# Patient Record
Sex: FEMALE | Race: WHITE | Employment: OTHER | URBAN - METROPOLITAN AREA
[De-identification: names, ages, dates, MRNs, and addresses within clinical notes are randomized per-mention and may not be internally consistent; named-entity substitution may affect disease eponyms.]

---

## 2017-01-17 ENCOUNTER — HOSPITAL ENCOUNTER (OUTPATIENT)
Dept: MRI IMAGING | Age: 67
Discharge: HOME | End: 2017-01-17
Attending: ORTHOPAEDIC SURGERY
Payer: MEDICARE

## 2017-01-17 DIAGNOSIS — M17.12 PRIMARY LOCALIZED OSTEOARTHROSIS OF LOWER LEG, LEFT: ICD-10-CM

## 2017-01-17 PROCEDURE — 73721 MRI JNT OF LWR EXTRE W/O DYE: CPT

## 2017-01-23 ENCOUNTER — OFFICE VISIT (OUTPATIENT)
Dept: FAMILY MEDICINE CLINIC | Age: 67
End: 2017-01-23

## 2017-01-23 ENCOUNTER — HOSPITAL ENCOUNTER (OUTPATIENT)
Dept: LAB | Age: 67
Discharge: HOME | End: 2017-01-23
Payer: MEDICARE

## 2017-01-23 VITALS
TEMPERATURE: 98 F | DIASTOLIC BLOOD PRESSURE: 78 MMHG | HEIGHT: 66 IN | SYSTOLIC BLOOD PRESSURE: 138 MMHG | RESPIRATION RATE: 18 BRPM | WEIGHT: 225 LBS | HEART RATE: 60 BPM | OXYGEN SATURATION: 98 % | BODY MASS INDEX: 36.16 KG/M2

## 2017-01-23 DIAGNOSIS — E11.9 DIABETES MELLITUS WITHOUT COMPLICATION (HCC): Primary | ICD-10-CM

## 2017-01-23 DIAGNOSIS — J30.1 SEASONAL ALLERGIC RHINITIS DUE TO POLLEN: ICD-10-CM

## 2017-01-23 DIAGNOSIS — Z23 ENCOUNTER FOR IMMUNIZATION: ICD-10-CM

## 2017-01-23 DIAGNOSIS — E78.00 PURE HYPERCHOLESTEROLEMIA: ICD-10-CM

## 2017-01-23 DIAGNOSIS — M25.562 LEFT ANTERIOR KNEE PAIN: ICD-10-CM

## 2017-01-23 DIAGNOSIS — K21.9 GASTROESOPHAGEAL REFLUX DISEASE WITHOUT ESOPHAGITIS: ICD-10-CM

## 2017-01-23 DIAGNOSIS — I10 ESSENTIAL HYPERTENSION: ICD-10-CM

## 2017-01-23 PROCEDURE — 82043 UR ALBUMIN QUANTITATIVE: CPT

## 2017-01-23 PROCEDURE — 80061 LIPID PANEL: CPT

## 2017-01-23 PROCEDURE — 83036 HEMOGLOBIN GLYCOSYLATED A1C: CPT

## 2017-01-23 PROCEDURE — 80053 COMPREHEN METABOLIC PANEL: CPT

## 2017-01-23 RX ORDER — FAMOTIDINE 20 MG/1
20 TABLET, FILM COATED ORAL DAILY
COMMUNITY
End: 2021-09-16 | Stop reason: CLARIF

## 2017-01-23 NOTE — MR AVS SNAPSHOT
Visit Information Date & Time Provider Department Dept. Phone Encounter #  
 1/23/2017 10:45 AM Prateek Crouch  [facility] [phone] [id number] Your Appointments 3/27/2017  2:30 PM  
COMPLETE PHYSICAL with Prateek Crouch MD  
[facility]) Appt Note: CPE   11/21/16  jdp  
 [address] [phone]  
[phone]  
  
   
 [address] [address] Upcoming Health Maintenance Date Due  
 FOOT EXAM Q1 9/23/1960 Pneumococcal 65+ Low/Medium Risk (2 of 2 - PPSV23) 10/21/2016 MEDICARE YEARLY EXAM 12/29/2016 HEMOGLOBIN A1C Q6M 1/15/2017 COLONOSCOPY 7/11/2017 EYE EXAM RETINAL OR DILATED Q1 6/13/2017 LIPID PANEL Q1 7/15/2017 MICROALBUMIN Q1 8/31/2017 GLAUCOMA SCREENING Q2Y 6/13/2018 BREAST CANCER SCRN MAMMOGRAM 11/7/2018 DTaP/Tdap/Td series (2 - Td) 10/25/2020 Allergies as of 1/23/2017  Review Complete On: 1/23/2017 By: Prateek Crouch MD  
  
 Severity Noted Reaction Type Reactions Doxycycline  05/30/2012   Systemic Rash  
 Sulfa (Sulfonamide Antibiotics)  08/09/2010    Not Reported This Time Tetracycline  08/09/2010    Not Reported This Time Current Immunizations  Never Reviewed Name Date Influenza High Dose Vaccine PF 10/3/2016, 10/16/2015 Pneumococcal Conjugate (PCV-13) 10/21/2015 Pneumococcal Polysaccharide (PPSV-23)  Incomplete TDAP Vaccine 10/25/2010 Zoster Vaccine, Live 1/23/2013 Not reviewed this visit You Were Diagnosed With   
  
 Codes Comments Diabetes mellitus without complication ([facility]    -  Primary ICD-10-CM: E11.9 ICD-9-CM: 250.00 Essential hypertension     ICD-10-CM: I10 
ICD-9-CM: 401.9 Pure hypercholesterolemia     ICD-10-CM: E78.00 ICD-9-CM: 272.0 Gastroesophageal reflux disease without esophagitis     ICD-10-CM: K21.9 ICD-9-CM: 530.81   
 Seasonal allergic rhinitis due to pollen     ICD-10-CM: J30.1 ICD-9-CM: 477.0 Encounter for immunization     ICD-10-CM: M25 ICD-9-CM: V03.89 Vitals BP Pulse Temp Resp Height(growth percentile) Weight(growth percentile) 138/78 (BP 1 Location: Right arm, BP Patient Position: Sitting) 60 98 °F (36.7 °C) (Oral) 18 5' 6\" (1.676 m) 225 lb (102.1 kg) SpO2 BMI OB Status Smoking Status 98% 36.32 kg/m2 Postmenopausal Never Smoker Vitals History BMI and BSA Data Body Mass Index Body Surface Area  
 36.32 kg/m 2 2.18 m 2 Preferred Pharmacy Pharmacy Name Phone 305 [facility], [address] [place] [address] [address] Your Updated Medication List  
  
   
This list is accurate as of: 1/23/17 12:28 PM.  Always use your most recent med list.  
  
  
  
  
 ALEVE 220 mg tablet Generic drug:  naproxen sodium Take 220-440 mg by mouth two (2) times daily (with meals). Prn  
  
 atenolol 50 mg tablet Commonly known as:  TENORMIN Take 1 tablet by mouth  daily  
  
 multivitamin tablet Commonly known as:  ONE A DAY Take 1 Tab by mouth daily. PEPCID 20 mg tablet Generic drug:  famotidine Take 20 mg by mouth two (2) times a day. pravastatin 20 mg tablet Commonly known as:  PRAVACHOL Take 1 tablet by mouth  nightly PROBIOTIC PO Take  by mouth daily. TYLENOL EXTRA STRENGTH 500 mg tablet Generic drug:  acetaminophen Take 2 Tabs by mouth every six (6) hours as needed for Pain. ZyrTEC 10 mg tablet Generic drug:  cetirizine Take 10 mg by mouth daily. We Performed the Following HEMOGLOBIN A1C WITH EAG [26266 CPT(R)] LIPID PANEL [21952 CPT(R)] METABOLIC PANEL, COMPREHENSIVE [36107 CPT(R)] MICROALBUMIN, UR, RAND W/ MICROALBUMIN/CREA RATIO [id number] CPT(R)] PNEUMOCOCCAL POLYSACCHARIDE VACCINE, 23-VALENT, ADULT OR IMMUNOSUPPRESSED PT DOSE, [16533 CPT(R)] NM COLLECTION VENOUS BLOOD,VENIPUNCTURE [id number] CPT(R)] NM HANDLG&/OR CONVEY OF SPEC FOR TR OFFICE TO LAB [02430 CPT(R)] Patient Instructions Preventing Falls: Care Instructions Your Care Instructions Getting around your home safely can be a challenge if you have injuries or health problems that make it easy for you to fall. Loose rugs and furniture in walkways are among the dangers for many older people who have problems walking or who have poor eyesight. People who have conditions such as arthritis, osteoporosis, or dementia also have to be careful not to fall. You can make your home safer with a few simple measures. Follow-up care is a key part of your treatment and safety. Be sure to make and go to all appointments, and call your doctor if you are having problems. It's also a good idea to know your test results and keep a list of the medicines you take. How can you care for yourself at home? Taking care of yourself · You may get dizzy if you do not drink enough water. To prevent dehydration, drink plenty of fluids, enough so that your urine is light yellow or clear like water. Choose water and other caffeine-free clear liquids. If you have kidney, heart, or liver disease and have to limit fluids, talk with your doctor before you increase the amount of fluids you drink. · Exercise regularly to improve your strength, muscle tone, and balance. Walk if you can. Swimming may be a good choice if you cannot walk easily. · Have your vision and hearing checked each year or any time you notice a change. If you have trouble seeing and hearing, you might not be able to avoid objects and could lose your balance. · Know the side effects of the medicines you take. Ask your doctor or pharmacist whether the medicines you take can affect your balance. Sleeping pills or sedatives can affect your balance. · Limit the amount of alcohol you drink. Alcohol can impair your balance and other senses. · Ask your doctor whether calluses or corns on your feet need to be removed. If you wear loose-fitting shoes because of calluses or corns, you can lose your balance and fall. · Talk to your doctor if you have numbness in your feet. Preventing falls at home · Remove raised doorway thresholds, throw rugs, and clutter. Repair loose carpet or raised areas in the floor. · Move furniture and electrical cords to keep them out of walking paths. · Use nonskid floor wax, and wipe up spills right away, especially on ceramic tile floors. · If you use a walker or cane, put rubber tips on it. If you use crutches, clean the bottoms of them regularly with an abrasive pad, such as steel wool. · Keep your house well lit, especially [place], and outside walkways. Use night-lights in areas such as hallways and bathrooms. Add extra light switches or use remote switches (such as switches that go on or off when you clap your hands) to make it easier to turn lights on if you have to get up during the night. · Install sturdy handrails on stairways. · Move items in your cabinets so that the things you use a lot are on the lower shelves (about waist level). · Keep a cordless phone and a flashlight with new batteries by your bed. If possible, put a phone in each of the main rooms of your house, or carry a cell phone in case you fall and cannot reach a phone. Or, you can wear a device around your neck or wrist. You push a button that sends a signal for help. · Wear low-heeled shoes that fit well and give your feet good support. Use footwear with nonskid soles. Check the heels and soles of your shoes for wear. Repair or replace worn heels or soles. · Do not wear socks without shoes on wood floors. · Walk on the grass when the sidewalks are slippery. If you live in an area that gets snow and ice in the winter, sprinkle salt on slippery steps and sidewalks. Preventing falls in the bath · Install grab bars and nonskid mats inside and outside your shower or tub and near the toilet and sinks. · Use shower chairs and bath benches. · Use a hand-held shower head that will allow you to sit while showering. · Get into a tub or shower by putting the weaker leg in first. Get out of a tub or shower with your strong side first. 
· Repair loose toilet seats and consider installing a raised toilet seat to make getting on and off the toilet easier. · Keep your bathroom door unlocked while you are in the shower. Where can you learn more? Go to http://yonatan-bianca.info/. Enter [phone] in the search box to learn more about \"Preventing Falls: Care Instructions. \" Current as of: August 4, 2016 Content Version: 11.1 © 6089-0084 [employer]. Care instructions adapted under license by [employer] (which disclaims liability or warranty for this information). If you have questions about a medical condition or this instruction, always ask your healthcare professional. [place] [address] any warranty or liability for your use of this information. Introducing [facility] HEALTH [facility]! Dear Nohemi Mcfarlane: Thank you for requesting a ibeatyou account. Our records indicate that you already have an active ibeatyou account. You can access your account anytime at [email or web address]. [email or web address] Did you know that you can access your hospital and ER discharge instructions at any time in ibeatyou? You can also review all of your test results from your hospital stay or ER visit. Additional Information If you have questions, please visit the Frequently Asked Questions section of the ibeatyou website at [email or web address] [email or web address]/. Remember, ibeatyou is NOT to be used for urgent needs. For medical emergencies, dial 911. Now available from your iPhone and Android! Please provide this summary of care documentation to your next provider. Your primary care clinician is listed as [place] [address], [facility]/s . If you have any questions after today's visit, please call [phone].

## 2017-01-23 NOTE — PROGRESS NOTES
HISTORY OF PRESENT ILLNESS  HPI  Adrien Epps is a 78 y.o. Female with a history of GERD, HTN, hypercholesterolemia, and DM-II who presents to the office today for a fasting follow up. Pt reports weight gain due to stress from her 's recent cardiac troubles and a torn left knee meniscus in December. She will be having surgery by Dr. Odin Weston in February to repair this. Her last steroid was a Medrol Dosepak 2 weeks ago. Past Medical History   Diagnosis Date    AR (allergic rhinitis)     Calculus of kidney     Dyspareunia     GERD (gastroesophageal reflux disease) 11/30/2010    HBP (high blood pressure) 8/9/2010    History of motion sickness 3/29/2014    Incomplete rectal prolapse- asymptomatic-2015- found on GYN exam 8/6/2015    Obesity (BMI 30-39.9) 6/30/2013    Situational stress-elderly Mom in [facility] 3/29/2014     Past Surgical History   Procedure Laterality Date    Hx cholecystectomy  1987    Hx colonoscopy  2007     repeat in 2017    Endoscopy, colon, diagnostic  2007     repeat in 2017     Current Outpatient Prescriptions on File Prior to Visit   Medication Sig Dispense Refill    pravastatin (PRAVACHOL) 20 mg tablet Take 1 tablet by mouth  nightly 90 Tab 1    atenolol (TENORMIN) 50 mg tablet Take 1 tablet by mouth  daily 90 Tab 1    multivitamin (ONE A DAY) tablet Take 1 Tab by mouth daily.  LACTOBACILLUS ACIDOPHILUS (PROBIOTIC PO) Take  by mouth daily.  cetirizine (ZYRTEC) 10 mg tablet Take 10 mg by mouth daily.  naproxen sodium (ALEVE) 220 mg tablet Take 220-440 mg by mouth two (2) times daily (with meals). Prn      acetaminophen (TYLENOL EXTRA STRENGTH) 500 mg tablet Take 2 Tabs by mouth every six (6) hours as needed for Pain. No current facility-administered medications on file prior to visit.       Allergies   Allergen Reactions    Doxycycline Rash    Sulfa (Sulfonamide Antibiotics) Not Reported This Time    Tetracycline Not Reported This Time Family History   Problem Relation Age of Onset    Hypertension Mother     Elevated Lipids Mother     Cancer Other      melanoma in daughter     Social History     Social History    Marital status:      Spouse name: N/A    Number of children: N/A    Years of education: N/A     Social History Main Topics    Smoking status: Never Smoker    Smokeless tobacco: Never Used    Alcohol use Yes      Comment: occ    Drug use: No    Sexual activity: Yes     Other Topics Concern     Service No    Blood Transfusions No    Caffeine Concern No    Occupational Exposure No    Hobby Hazards No    Sleep Concern No    Stress Concern No    Weight Concern Yes    Special Diet No    Back Care No    Exercise Yes     walking    Bike Helmet No     does not ride   [place] Yes    Self-Exams Yes     Social History Narrative           Review of Systems   Constitutional: Negative for chills, diaphoresis, fever, malaise/fatigue and weight loss. Eyes: Negative for blurred vision, double vision, pain and redness. Respiratory: Negative for cough, shortness of breath and wheezing. Cardiovascular: Negative for chest pain, palpitations, orthopnea, claudication, leg swelling and PND. Genitourinary: Negative for frequency. Skin: Negative for itching and rash. Neurological: Negative for dizziness, tingling, tremors, sensory change, speech change, focal weakness, seizures, loss of consciousness, weakness and headaches. Endo/Heme/Allergies: Negative for environmental allergies and polydipsia. Does not bruise/bleed easily.      Results for orders placed or performed in visit on 01/23/17   LIPID PANEL   Result Value Ref Range    Cholesterol, total 194 100 - 199 mg/dL    Triglyceride 114 0 - 149 mg/dL    HDL Cholesterol 90 >39 mg/dL    VLDL, calculated 23 5 - 40 mg/dL    LDL, calculated 81 0 - 99 mg/dL   METABOLIC PANEL, COMPREHENSIVE   Result Value Ref Range    Glucose 107 (H) 65 - 99 mg/dL    BUN 11 8 - 27 mg/dL    Creatinine 0.79 0.57 - 1.00 mg/dL    GFR est non-AA 78 >59 mL/min/1.73    GFR est AA 90 >59 mL/min/1.73    BUN/Creatinine ratio 14 11 - 26    Sodium 142 134 - 144 mmol/L    Potassium 4.7 3.5 - 5.2 mmol/L    Chloride 101 96 - 106 mmol/L    CO2 26 18 - 29 mmol/L    Calcium 9.2 8.7 - 10.3 mg/dL    Protein, total 6.0 6.0 - 8.5 g/dL    Albumin 4.0 3.6 - 4.8 g/dL    GLOBULIN, TOTAL 2.0 1.5 - 4.5 g/dL    A-G Ratio 2.0 1.1 - 2.5    Bilirubin, total 0.7 0.0 - 1.2 mg/dL    Alk. phosphatase 109 39 - 117 IU/L    AST (SGOT) 29 0 - 40 IU/L    ALT (SGPT) 36 (H) 0 - 32 IU/L   HEMOGLOBIN A1C WITH EAG   Result Value Ref Range    Hemoglobin A1c 6.4 (H) 4.8 - 5.6 %    Estimated average glucose 137 mg/dL   MICROALBUMIN, UR, RAND W/ MICROALBUMIN/CREA RATIO   Result Value Ref Range    Creatinine, urine 41.7 Not Estab. mg/dL    Microalbumin, urine <3.0 Not Estab. ug/mL    Microalb/Creat ratio (ug/mg creat.) <7.2 0.0 - 30.0 mg/g creat   CVD REPORT   Result Value Ref Range    INTERPRETATION Note            Physical Exam  Visit Vitals    /78 (BP 1 Location: Right arm, BP Patient Position: Sitting)    Pulse 60    Temp 98 °F (36.7 °C) (Oral)    Resp 18    Ht 5' 6\" (1.676 m)    Wt 225 lb (102.1 kg)    SpO2 98%    BMI 36.32 kg/m2     Head: Normocephalic, without obvious abnormality, atraumatic  Eyes: conjunctivae/corneas clear. PERRL, EOM's intact.    Neck: supple, symmetrical, trachea midline, no adenopathy, thyroid: not enlarged, symmetric, no tenderness/mass/nodules, no carotid bruit and no JVD  Lungs: clear to auscultation bilaterally  Heart: regular rate and rhythm, S1, S2 normal, no murmur, click, rub or gallop  Extremities: extremities normal, atraumatic, no cyanosis or edema  Pulses: 2+ and symmetric  Lymph nodes: Cervical, supraclavicular, and axillary nodes normal.  Neurologic: Grossly normal  Diabetic foot exam:     Left: Reflexes 2+     Proprioception normal    Filament test normal sensation with micro filament   Pulse DP: 2+ (normal)   Pulse PT: 2+ (normal)   Deformities: None  Right: Reflexes 2+              Proprioception normal   Filament test normal sensation with micro filament   Pulse DP: 2+ (normal)   Pulse PT: 2+ (normal)   Deformities: None           ASSESSMENT and PLAN    ICD-10-CM ICD-9-CM    1. Diabetes mellitus without complication ([facility].)- 1479 E11.9 250.00 HEMOGLOBIN A1C WITH EAG      MICROALBUMIN, UR, RAND W/ MICROALBUMIN/CREA RATIO      NJ HANDLG&/OR CONVEY OF SPEC FOR TR OFFICE TO LAB      NJ COLLECTION VENOUS BLOOD,VENIPUNCTURE       DIABETES FOOT EXAM   2. Essential hypertension B47 240.4 METABOLIC PANEL, COMPREHENSIVE      NJ HANDLG&/OR CONVEY OF SPEC FOR TR OFFICE TO LAB      NJ COLLECTION VENOUS BLOOD,VENIPUNCTURE   3. Pure hypercholesterolemia E78.00 272.0 LIPID PANEL      NJ HANDLG&/OR CONVEY OF SPEC FOR TR OFFICE TO LAB      NJ COLLECTION VENOUS BLOOD,VENIPUNCTURE   4. Gastroesophageal reflux disease without esophagitis K21.9 530.81 famotidine (PEPCID) 20 mg tablet   5. Seasonal allergic rhinitis due to pollen J30.1 477.0    6. Encounter for immunization Z23 V03.89 PNEUMOCOCCAL POLYSACCHARIDE VACCINE, 23-VALENT, ADULT OR IMMUNOSUPPRESSED PT DOSE,   7. Left anterior knee pain M25.562 719.46      Nohemi Mcfarlane was seen today for hypertension, cholesterol problem and diabetes.     Diagnoses and all orders for this visit:    Diabetes mellitus without complication ([facility] 75.)- 6001  -     HEMOGLOBIN A1C WITH EAG  -     MICROALBUMIN, UR, RAND W/ MICROALBUMIN/CREA RATIO  -     NJ HANDLG&/OR CONVEY OF SPEC FOR TR OFFICE TO LAB  -     NJ COLLECTION VENOUS BLOOD,VENIPUNCTURE  -      DIABETES FOOT EXAM    Essential hypertension  -     METABOLIC PANEL, COMPREHENSIVE  -     NJ HANDLG&/OR CONVEY OF SPEC FOR TR OFFICE TO LAB  -     NJ COLLECTION VENOUS BLOOD,VENIPUNCTURE    Pure hypercholesterolemia  -     LIPID PANEL  -     NJ HANDLG&/OR CONVEY OF SPEC FOR TR OFFICE TO LAB  -     NJ COLLECTION VENOUS BLOOD,VENIPUNCTURE    Gastroesophageal reflux disease without esophagitis    Seasonal allergic rhinitis due to pollen    Encounter for immunization  -     Pneumococcal polysaccharide vaccine, 23-valent, adult or immunosuppressed pt dose    Left anterior knee pain    Other orders  -     CVD REPORT      Follow-up Disposition:  Return in about 6 months (around 7/23/2017), or bp or glucose OOC, for F/U HTN,CHOL,DM. Discussed specific diabetic recommendations: low cholesterol diet, weight control and daily exercise discussed, home glucose monitoring emphasized, all medications, side effects and compliance discussed carefully, foot care discussed and Podiatry visits discussed, annual eye examinations at Ophthalmology discussed, glycohemoglobin and other lab monitoring discussed and long term diabetic complications discussed. lab results and schedule of future lab studies reviewed with patient  reviewed diet, exercise and weight control  cardiovascular risk and specific lipid/LDL goals reviewed  reviewed medications and side effects in detail  Please call my office if there are any questions- [phone]. I will arrange for follow up after the lab tests done from today return    Call for refills on medications as needed. Discussed expected course/resolution/complications of diagnosis in detail with patient. Medication risks/benefits/costs/interactions/alternatives discussed with patient. Pt was given an after visit summary which includes diagnoses, current medications & vitals. Pt expressed understanding with the diagnosis and plan. Total 25 minutes,60 % counseling re:   I encouraged patient to lose weight. If she is unable to do that, we will need to start medication. Because she was able to lose some weight last year at first, we will give that 1 more try before we decide to add medicines.  Unfortunately the Prednisone she has been getting for her left knee has likely aggravated her glucose quite a bit as well.   Recommended a weekly \"heart check. \" I went into detail how to do this. Reviewed BP, cholesterol and diabetic goals. LDL goal<100,HDL goal>45, Triglyceride goal<150, A1C< 7.0, BP<140/90. Reviewed in detail the proper technique of checking the blood pressure- check it on an average day only, not on a stressful day, sitting, no exercise for at least 1 hour and not experiencing any new pain( chronic pain is OK). Patient encouraged to check BP sitting and standing at least once a month and to report these readings to me if > 140/ 90 on average , or if the standing BP is >  15 points lower than the sitting. Reviewed symptoms, or lack thereof, of hypertension and elevated cholesterol. Also, discussed symptoms of concern that were noted today in the note above, treatment options( including doing nothing), when to follow up before recommended time frame. Also, answered all questions. This document was written by Tanner Martin, as dictated by Doyle Goff MD.   I have reviewed and agree with the above note and have made corrections where appropriate Jagdepe Johnston M.D.

## 2017-01-23 NOTE — PROGRESS NOTES
Chief Complaint   Patient presents with    Hypertension     follow up    Cholesterol Problem     fasting today    Diabetes     follow up       Reviewed Record in preparation for visit and have obtained necessary documentation. Identified pt with two pt identifiers (Name @ )    Health Maintenance Due   Topic    FOOT EXAM Q1     Pneumococcal 65+ Low/Medium Risk (2 of 2 - PPSV23)    MEDICARE YEARLY EXAM     HEMOGLOBIN A1C Q6M     COLONOSCOPY          1. Have you been to the ER, urgent care clinic since your last visit? Hospitalized since your last visit? No    2. Have you seen or consulted any other health care providers outside of the [address] [place] [address] since your last visit? Include any pap smears or colon screening.  No

## 2017-01-23 NOTE — PATIENT INSTRUCTIONS

## 2017-01-24 LAB
ALBUMIN SERPL-MCNC: 4 G/DL (ref 3.6–4.8)
ALBUMIN/CREAT UR: <7.2 MG/G CREAT (ref 0–30)
ALBUMIN/GLOB SERPL: 2 {RATIO} (ref 1.1–2.5)
ALP SERPL-CCNC: 109 IU/L (ref 39–117)
ALT SERPL-CCNC: 36 IU/L (ref 0–32)
AST SERPL-CCNC: 29 IU/L (ref 0–40)
BILIRUB SERPL-MCNC: 0.7 MG/DL (ref 0–1.2)
BUN SERPL-MCNC: 11 MG/DL (ref 8–27)
BUN/CREAT SERPL: 14 (ref 11–26)
CALCIUM SERPL-MCNC: 9.2 MG/DL (ref 8.7–10.3)
CHLORIDE SERPL-SCNC: 101 MMOL/L (ref 96–106)
CHOLEST SERPL-MCNC: 194 MG/DL (ref 100–199)
CO2 SERPL-SCNC: 26 MMOL/L (ref 18–29)
CREAT SERPL-MCNC: 0.79 MG/DL (ref 0.57–1)
CREAT UR-MCNC: 41.7 MG/DL
EST. AVERAGE GLUCOSE BLD GHB EST-MCNC: 137 MG/DL
GLOBULIN SER CALC-MCNC: 2 G/DL (ref 1.5–4.5)
GLUCOSE SERPL-MCNC: 107 MG/DL (ref 65–99)
HBA1C MFR BLD: 6.4 % (ref 4.8–5.6)
HDLC SERPL-MCNC: 90 MG/DL
INTERPRETATION, 910389: NORMAL
LDLC SERPL CALC-MCNC: 81 MG/DL (ref 0–99)
MICROALBUMIN UR-MCNC: <3 UG/ML
POTASSIUM SERPL-SCNC: 4.7 MMOL/L (ref 3.5–5.2)
PROT SERPL-MCNC: 6 G/DL (ref 6–8.5)
SODIUM SERPL-SCNC: 142 MMOL/L (ref 134–144)
TRIGL SERPL-MCNC: 114 MG/DL (ref 0–149)
VLDLC SERPL CALC-MCNC: 23 MG/DL (ref 5–40)

## 2017-02-27 DIAGNOSIS — I10 ESSENTIAL HYPERTENSION: ICD-10-CM

## 2017-02-27 RX ORDER — ATENOLOL 50 MG/1
TABLET ORAL
Qty: 90 TAB | Refills: 1 | Status: SHIPPED | OUTPATIENT
Start: 2017-02-27 | End: 2017-06-07 | Stop reason: SDUPTHER

## 2017-02-27 RX ORDER — PRAVASTATIN SODIUM 20 MG/1
TABLET ORAL
Qty: 90 TAB | Refills: 1 | Status: SHIPPED | OUTPATIENT
Start: 2017-02-27 | End: 2017-06-07 | Stop reason: SDUPTHER

## 2017-05-01 ENCOUNTER — OFFICE VISIT (OUTPATIENT)
Dept: FAMILY MEDICINE CLINIC | Age: 67
End: 2017-05-01

## 2017-05-01 ENCOUNTER — HOSPITAL ENCOUNTER (OUTPATIENT)
Dept: LAB | Age: 67
Discharge: HOME | End: 2017-05-01
Payer: MEDICARE

## 2017-05-01 VITALS
OXYGEN SATURATION: 98 % | HEIGHT: 66 IN | RESPIRATION RATE: 18 BRPM | HEART RATE: 51 BPM | DIASTOLIC BLOOD PRESSURE: 84 MMHG | WEIGHT: 222.4 LBS | SYSTOLIC BLOOD PRESSURE: 118 MMHG | BODY MASS INDEX: 35.74 KG/M2 | TEMPERATURE: 97.8 F

## 2017-05-01 DIAGNOSIS — I10 ESSENTIAL HYPERTENSION: Primary | ICD-10-CM

## 2017-05-01 DIAGNOSIS — E11.9 DIABETES MELLITUS WITHOUT COMPLICATION (HCC): ICD-10-CM

## 2017-05-01 DIAGNOSIS — Z71.89 ACP (ADVANCE CARE PLANNING): ICD-10-CM

## 2017-05-01 DIAGNOSIS — Z00.00 MEDICARE ANNUAL WELLNESS VISIT, SUBSEQUENT: ICD-10-CM

## 2017-05-01 DIAGNOSIS — E78.00 PURE HYPERCHOLESTEROLEMIA: ICD-10-CM

## 2017-05-01 PROCEDURE — 80053 COMPREHEN METABOLIC PANEL: CPT

## 2017-05-01 PROCEDURE — 80061 LIPID PANEL: CPT

## 2017-05-01 PROCEDURE — 83036 HEMOGLOBIN GLYCOSYLATED A1C: CPT

## 2017-05-01 PROCEDURE — 36415 COLL VENOUS BLD VENIPUNCTURE: CPT

## 2017-05-01 RX ORDER — LEVOCETIRIZINE DIHYDROCHLORIDE 5 MG/1
TABLET, FILM COATED ORAL
COMMUNITY
End: 2017-10-02

## 2017-05-01 NOTE — PROGRESS NOTES
HISTORY OF PRESENT ILLNESS  HPI  Radha Krishnamurthy is a 78 y.o. Female with history of HTN, DM-II, and hyperlipidemia who presents to office today for follow-up. Pt denies SOB and chest pain. Pt does not check her glucose at home. Pt complains of right lower back pain radiating down her leg since 4/29/17 that is aggravated by standing. She has been taking Aleve. Pt notes increased stress due to her 's heart surgery about a month ago. Past Medical History:   Diagnosis Date    AR (allergic rhinitis)     Calculus of kidney     Dyspareunia     GERD (gastroesophageal reflux disease) 11/30/2010    HBP (high blood pressure) 8/9/2010    History of motion sickness 3/29/2014    Incomplete rectal prolapse- asymptomatic-2015- found on GYN exam 8/6/2015    Obesity (BMI 30-39.9) 6/30/2013    Situational stress-elderly Mom in [facility] 3/29/2014     Past Surgical History:   Procedure Laterality Date    ENDOSCOPY, COLON, DIAGNOSTIC  2007    repeat in 2017   [address] HX COLONOSCOPY  2007    repeat in 2017    HX KNEE ARTHROSCOPY Left 02/15/2017    meniscus     Current Outpatient Prescriptions on File Prior to Visit   Medication Sig Dispense Refill    atenolol (TENORMIN) 50 mg tablet Take 1 tablet by mouth  daily 90 Tab 1    pravastatin (PRAVACHOL) 20 mg tablet Take 1 tablet by mouth  nightly 90 Tab 1    famotidine (PEPCID) 20 mg tablet Take 20 mg by mouth two (2) times a day.  multivitamin (ONE A DAY) tablet Take 1 Tab by mouth daily.  LACTOBACILLUS ACIDOPHILUS (PROBIOTIC PO) Take  by mouth daily.  naproxen sodium (ALEVE) 220 mg tablet Take 220-440 mg by mouth two (2) times daily (with meals). Prn      acetaminophen (TYLENOL EXTRA STRENGTH) 500 mg tablet Take 2 Tabs by mouth every six (6) hours as needed for Pain. No current facility-administered medications on file prior to visit.       Allergies   Allergen Reactions    Doxycycline Rash    Sulfa (Sulfonamide Antibiotics) Not Reported This Time    Tetracycline Not Reported This Time     Family History   Problem Relation Age of Onset    Hypertension Mother     Elevated Lipids Mother     Cancer Other      melanoma in daughter     Social History     Social History    Marital status:      Spouse name: N/A    Number of children: N/A    Years of education: N/A     Social History Main Topics    Smoking status: Never Smoker    Smokeless tobacco: Never Used    Alcohol use Yes      Comment: occ    Drug use: No    Sexual activity: Yes     Other Topics Concern     Service No    Blood Transfusions No    Caffeine Concern No    Occupational Exposure No    Hobby Hazards No    Sleep Concern No    Stress Concern No    Weight Concern Yes    Special Diet No    Back Care No    Exercise Yes     walking    Bike Helmet No     does not ride   [place] Yes    Self-Exams Yes     Social History Narrative               Review of Systems   Constitutional: Negative for chills, diaphoresis, fever, malaise/fatigue and weight loss. Eyes: Negative for blurred vision, double vision and pain. Respiratory: Negative for cough, shortness of breath and wheezing. Cardiovascular: Negative for chest pain, palpitations, orthopnea, claudication, leg swelling and PND. Genitourinary: Negative for frequency. Musculoskeletal: Positive for back pain (right lower). Skin: Negative for itching and rash. Neurological: Negative for dizziness, tingling, tremors, sensory change, speech change, focal weakness, seizures, loss of consciousness, weakness and headaches. Endo/Heme/Allergies: Negative for environmental allergies and polydipsia. Does not bruise/bleed easily.      Results for orders placed or performed in visit on 01/23/17   LIPID PANEL   Result Value Ref Range    Cholesterol, total 194 100 - 199 mg/dL    Triglyceride 114 0 - 149 mg/dL    HDL Cholesterol 90 >39 mg/dL    VLDL, calculated 23 5 - 40 mg/dL    LDL, calculated 81 0 - 99 mg/dL   METABOLIC PANEL, COMPREHENSIVE   Result Value Ref Range    Glucose 107 (H) 65 - 99 mg/dL    BUN 11 8 - 27 mg/dL    Creatinine 0.79 0.57 - 1.00 mg/dL    GFR est non-AA 78 >59 mL/min/1.73    GFR est AA 90 >59 mL/min/1.73    BUN/Creatinine ratio 14 11 - 26    Sodium 142 134 - 144 mmol/L    Potassium 4.7 3.5 - 5.2 mmol/L    Chloride 101 96 - 106 mmol/L    CO2 26 18 - 29 mmol/L    Calcium 9.2 8.7 - 10.3 mg/dL    Protein, total 6.0 6.0 - 8.5 g/dL    Albumin 4.0 3.6 - 4.8 g/dL    GLOBULIN, TOTAL 2.0 1.5 - 4.5 g/dL    A-G Ratio 2.0 1.1 - 2.5    Bilirubin, total 0.7 0.0 - 1.2 mg/dL    Alk. phosphatase 109 39 - 117 IU/L    AST (SGOT) 29 0 - 40 IU/L    ALT (SGPT) 36 (H) 0 - 32 IU/L   HEMOGLOBIN A1C WITH EAG   Result Value Ref Range    Hemoglobin A1c 6.4 (H) 4.8 - 5.6 %    Estimated average glucose 137 mg/dL   MICROALBUMIN, UR, RAND W/ MICROALBUMIN/CREA RATIO   Result Value Ref Range    Creatinine, urine 41.7 Not Estab. mg/dL    Microalbumin, urine <3.0 Not Estab. ug/mL    Microalb/Creat ratio (ug/mg creat.) <7.2 0.0 - 30.0 mg/g creat   CVD REPORT   Result Value Ref Range    INTERPRETATION Note              Physical Exam  Visit Vitals    /84 (BP 1 Location: Right arm, BP Patient Position: Sitting)    Pulse (!) 51    Temp 97.8 °F (36.6 °C) (Oral)    Resp 18    Ht 5' 6\" (1.676 m)    Wt 222 lb 6.4 oz (100.9 kg)    SpO2 98%    BMI 35.9 kg/m2     Head: Normocephalic, without obvious abnormality, atraumatic  Eyes: conjunctivae/corneas clear. PERRL, EOM's intact.    Neck: supple, symmetrical, trachea midline, no adenopathy, thyroid: not enlarged, symmetric, no tenderness/mass/nodules, no carotid bruit and no JVD  Lungs: clear to auscultation bilaterally  Heart: regular rate and rhythm, S1, S2 normal, no murmur, click, rub or gallop  Extremities: extremities normal, atraumatic, no cyanosis or edema  Back: discomfort in the right lower back area aggravated by use of the muscles in that area, negative SLR bilaterally, discomfort in the right side with lifting her left leg more than lifting her right leg  Pulses: 2+ and symmetric  Lymph nodes: Cervical, supraclavicular, and axillary nodes normal.  Neurologic: Grossly normal            ASSESSMENT and PLAN    ICD-10-CM ICD-9-CM    1. Essential hypertension I10 401.9 LIPID PANEL      METABOLIC PANEL, COMPREHENSIVE   2. Pure hypercholesterolemia E78.00 272.0 LIPID PANEL      METABOLIC PANEL, COMPREHENSIVE   3. Diabetes mellitus without complication ([address]- 5388 E11.9 250.00 HEMOGLOBIN A1C WITH EAG   4. Medicare annual wellness visit, subsequent Z00.00 V70.0      Marie Samuels was seen today for diabetes, back pain and cholesterol problem. Diagnoses and all orders for this visit:    Essential hypertension  -     LIPID PANEL  -     METABOLIC PANEL, COMPREHENSIVE    Pure hypercholesterolemia  -     LIPID PANEL  -     METABOLIC PANEL, COMPREHENSIVE    Diabetes mellitus without complication ([facility]- 9008  -     HEMOGLOBIN A1C WITH EAG    Medicare annual wellness visit, subsequent      Follow-up Disposition:  Return in about 6 months (around 11/1/2017), or if right LBP symptoms worsen or fail to improve, for F/U DM, F/U cholesterol, F/U hypertension. lab results and schedule of future lab studies reviewed with patient  reviewed diet, exercise and weight control  cardiovascular risk and specific lipid/LDL goals reviewed  reviewed medications and side effects in detail  Please call my office if there are any questions- [phone]. I will arrange for follow up after the lab tests done from today return  Recommended a weekly \"heart check. \" I went into detail how to do this. Call for refills on medications as needed. Discussed expected course/resolution/complications of diagnosis in detail with patient. Medication risks/benefits/costs/interactions/alternatives discussed with patient.    Pt was given an after visit summary which includes diagnoses, current medications & vitals. Pt expressed understanding with the diagnosis and plan. Total 25 minutes,60 % counseling re: I reviewed her most recent A1C with her. Her weight went up a significant amount two visits ago and is now on its way back down. She's aware that she needs to keep the weight going in the right direction to help her diabetes, and we'll continue to work on that. Reviewed symptoms, or lack thereof, of hypertension and elevated cholesterol. Reviewed BP, cholesterol and diabetic goals. LDL goal<100,HDL goal>45, Triglyceride goal<150, A1C< 7.0, BP<140/90. Reviewed in detail the proper technique of checking the blood pressure- check it on an average day only, not on a stressful day, sitting, no exercise for at least 1 hour and not experiencing any new pain( chronic pain is OK). Patient encouraged to check BP sitting and standing at least once a month and to report these readings to me if > 140/ 90 on average , or if the standing BP is >  15 points lower than the sitting. For her back, I gave her lower back exercises and a sheet was given. She should do ten of each BID and may use Aleve 1-2 BID. I did encourage her to do a \"heart check\" on a weekly basis. She did have left knee arthroscopy back in February and had mainly a meniscus repair. She was told that she had a significant amount of arthritis in the same area, and I encouraged her to do cycling, working her way up to 30 minutes minimum three times a week. She should continue this lifelong to help control any arthritis. I discussed advance directives; apparently she did this with a  and will get her and her 's copy for the chart. Also, discussed symptoms of concern that were noted today in the note above, treatment options( including doing nothing), when to follow up before recommended time frame. Also, answered all questions.     This document was written by Adam Coon, as dictated by Karen Magallon MD.  I have reviewed and agree with the above note and have made corrections where appropriate Jagdeep Liu M.D.

## 2017-05-01 NOTE — MR AVS SNAPSHOT
Visit Information Date & Time Provider Department Dept. Phone Encounter #  
 5/1/2017 10:45 AM Ivett Georges MD [address] [place] [address] [address] [phone] [id number] Your Appointments 8/11/2017  9:00 AM  
COMPLETE PHYSICAL with Ivett Georges MD  
[facility]) Appt Note: COMPLETE PHYSICAL -  [facility]  3-  
 [address] [address]  
[phone]  
  
   
 [address] [address] Upcoming Health Maintenance Date Due  
 MEDICARE YEARLY EXAM 12/29/2016 COLONOSCOPY 7/11/2017 EYE EXAM RETINAL OR DILATED Q1 6/13/2017 HEMOGLOBIN A1C Q6M 7/23/2017 INFLUENZA AGE 9 TO ADULT 8/1/2017 MICROALBUMIN Q1 1/23/2018 LIPID PANEL Q1 1/23/2018 FOOT EXAM Q1 2/12/2018 GLAUCOMA SCREENING Q2Y 6/13/2018 BREAST CANCER SCRN MAMMOGRAM 11/7/2018 DTaP/Tdap/Td series (2 - Td) 10/25/2020 Allergies as of 5/1/2017  Review Complete On: 5/1/2017 By: Collier Schirmer, LPN Severity Noted Reaction Type Reactions Doxycycline  05/30/2012   Systemic Rash  
 Sulfa (Sulfonamide Antibiotics)  08/09/2010    Not Reported This Time Tetracycline  08/09/2010    Not Reported This Time Current Immunizations  Never Reviewed Name Date Influenza High Dose Vaccine PF 10/3/2016, 10/16/2015 Pneumococcal Conjugate (PCV-13) 10/21/2015 Pneumococcal Polysaccharide (PPSV-23) 1/23/2017 TDAP Vaccine 10/25/2010 Zoster Vaccine, Live 1/23/2013 Not reviewed this visit You Were Diagnosed With   
  
 Codes Comments Essential hypertension    -  Primary ICD-10-CM: I10 
ICD-9-CM: 401.9 Pure hypercholesterolemia     ICD-10-CM: E78.00 ICD-9-CM: 272.0 Diabetes mellitus without complication ([facility]     WER-11-OY: E11.9 ICD-9-CM: 250.00 Vitals BP Pulse Temp Resp Height(growth percentile) Weight(growth percentile) 118/84 (BP 1 Location: Right arm, BP Patient Position: Sitting) (!) 51 97.8 °F (36.6 °C) (Oral) 18 5' 6\" (1.676 m) 222 lb 6.4 oz (100.9 kg) SpO2 BMI OB Status Smoking Status 98% 35.9 kg/m2 Postmenopausal Never Smoker Vitals History BMI and BSA Data Body Mass Index Body Surface Area 35.9 kg/m 2 2.17 m 2 Preferred Pharmacy Pharmacy Name Phone 305 [facility], [address] [place] [address] [address] Your Updated Medication List  
  
   
This list is accurate as of: 5/1/17 11:37 AM.  Always use your most recent med list.  
  
  
  
  
 ALEVE 220 mg tablet Generic drug:  naproxen sodium Take 220-440 mg by mouth two (2) times daily (with meals). Prn  
  
 atenolol 50 mg tablet Commonly known as:  TENORMIN Take 1 tablet by mouth  daily  
  
 multivitamin tablet Commonly known as:  ONE A DAY Take 1 Tab by mouth daily. PEPCID 20 mg tablet Generic drug:  famotidine Take 20 mg by mouth two (2) times a day. pravastatin 20 mg tablet Commonly known as:  PRAVACHOL Take 1 tablet by mouth  nightly PROBIOTIC PO Take  by mouth daily. TYLENOL EXTRA STRENGTH 500 mg tablet Generic drug:  acetaminophen Take 2 Tabs by mouth every six (6) hours as needed for Pain. XYZAL 5 mg tablet Generic drug:  levocetirizine Take  by mouth. ZyrTEC 10 mg tablet Generic drug:  cetirizine Take 10 mg by mouth daily. We Performed the Following HEMOGLOBIN A1C WITH EAG [73356 CPT(R)] LIPID PANEL [61224 CPT(R)] METABOLIC PANEL, COMPREHENSIVE [69932 CPT(R)] Patient Instructions Noninsulin Medicines for Type 2 Diabetes: Care Instructions Your Care Instructions There are different types of noninsulin medicines for diabetes. Each works in a different way to help you control your blood sugar. Some types help your body make insulin to lower your blood sugar.  Others lower how much insulin your body needs. Some can slow how quickly your body digests sugars. And some can remove extra glucose through your urine. Some of these medicines are: · Alpha-glucosidase inhibitors. These keep starches from breaking down. This means that they lower the amount of glucose absorbed when you eat. They do not help your body make more insulin. So they will not cause low blood sugar unless they are used with other medicines for diabetes. They include acarbose and miglitol. · DPP-4 inhibitors. These help your body increase the level of insulin after eating. They also help your body make less of a hormone that raises blood sugar. They include linagliptin, saxagliptin, and sitagliptin. · Incretin hormones (GLP-1 receptor agonists). Your body makes a protein that can raise your insulin level, lower your blood sugar, and make you less hungry. You can inject hormone medicines that work the same way as this protein. They include exenatide and liraglutide. · Meglitinides. These help your body release insulin. They also help slow how your body digests sugars. In this way, they prevent your blood sugar from rising too fast after you eat. They include nateglinide and repaglinide. · Metformin. This lowers how much glucose your liver makes. And it helps you respond better to insulin. It also lowers the amount of stored sugar that your liver releases when you are not eating. · Sodium glucose co-transporter 2 inhibitors (SGLT2 inhibitors). These help to remove extra glucose through your urine. They may also help some people lose weight. They include canagliflozin, dapagliflozin, and empagliflozin. · Sulfonylureas. These help your body release more insulin. Some work for many hours and can cause low blood sugar if you don't eat as you expected. They include glipizide and glyburide. · Thiazolidinediones. These reduce the amount of blood glucose. They also help you respond better to insulin.  They include pioglitazone and rosiglitazone. You may need to take more than one medicine for diabetes. Two or more medicines may work better to lower your blood sugar level than just one medicine. Follow-up care is a key part of your treatment and safety. Be sure to make and go to all appointments, and call your doctor if you are having problems. It's also a good idea to know your test results and keep a list of the medicines you take. How can you care for yourself at home? · Eat a healthy diet. Get some exercise each day. This may help you to reduce how much medicine you need. · Do not take other prescription or over-the-counter medicines, vitamins, herbal products, or supplements without talking to your doctor first. Some medicines for type 2 diabetes can cause problems with other medicines or supplements. · Tell your doctor if you plan to get pregnant. Some of these drugs are not safe for pregnant women. · Be safe with medicines. Take your medicines exactly as prescribed. Meglitinides or sulfonylureas can cause your blood sugar to drop very low. Call your doctor if you think you are having a problem with your medicine. · Check your blood sugar levels often. You can use a glucose monitor. Keeping track can help you know how certain foods, activities, and medicines affect your blood sugar. And it can help you keep your blood sugar from getting so low that it's not safe. When should you call for help? Call 911 anytime you think you may need emergency care. For example, call if: 
· You passed out (lost consciousness), or you suddenly become very sleepy or confused. (You may have very low blood sugar.) · You have symptoms of high blood sugar, such as: ¨ Blurred vision. ¨ Trouble staying awake or being woken up. ¨ Fast, deep breathing. ¨ Breath that smells fruity. ¨ Belly pain, not feeling hungry, and vomiting. ¨ Feeling confused. Call your doctor now or seek immediate medical care if: · You are sick and cannot control your blood sugar. · You have been vomiting or have had diarrhea for more than 6 hours. · Your blood sugar stays higher than the level your doctor has set for you. · You have symptoms of low blood sugar, such as: ¨ Sweating. ¨ Feeling nervous, shaky, and weak. ¨ Extreme hunger and slight nausea. ¨ Dizziness and headache. ¨ Blurred vision. ¨ Confusion. Watch closely for changes in your health, and be sure to contact your doctor if: 
· You have a hard time knowing when your blood sugar is low. · You have trouble keeping your blood sugar in the target range. · You often have problems controlling your blood sugar. · You have symptoms of long-term diabetes problems, such as: ¨ New vision changes. ¨ New pain, numbness, or tingling in your hands or feet. ¨ Skin problems. Where can you learn more? Go to http://yonatan-bianca.info/. Enter H153 in the search box to learn more about \"Noninsulin Medicines for Type 2 Diabetes: Care Instructions. \" Current as of: August 3, 2016 Content Version: 11.2 © 0330-1768 [employer]. Care instructions adapted under license by [employer] (which disclaims liability or warranty for this information). If you have questions about a medical condition or this instruction, always ask your healthcare professional. [address] any warranty or liability for your use of this information. Introducing [facility]! Dear Flomot Custard: Thank you for requesting a Aligo account. Our records indicate that you already have an active Aligo account. You can access your account anytime at [email or web address] Did you know that you can access your hospital and ER discharge instructions at any time in Aligo? You can also review all of your test results from your hospital stay or ER visit. Additional Information If you have questions, please visit the Frequently Asked Questions section of the [employer] website at [email or web address] Remember, Surveying [employer] Mapping (SAM) is NOT to be used for urgent needs. For medical emergencies, dial 911. Now available from your iPhone and Android! Please provide this summary of care documentation to your next provider. Your primary care clinician is listed as [address] [address], [facility]/s . If you have any questions after today's visit, please call [phone].

## 2017-05-01 NOTE — PROGRESS NOTES
Chief Complaint   Patient presents with    Diabetes     follow up    Back Pain     1. Have you been to the ER, urgent care clinic since your last visit? Hospitalized since your last visit? Yes When: April 7, 2016 in [place], sinus infection    2. Have you seen or consulted any other health care providers outside of the [address] [place] [address] since your last visit? Include any pap smears or colon screening.  No

## 2017-05-01 NOTE — PATIENT INSTRUCTIONS
Noninsulin Medicines for Type 2 Diabetes: Care Instructions  Your Care Instructions  There are different types of noninsulin medicines for diabetes. Each works in a different way to help you control your blood sugar. Some types help your body make insulin to lower your blood sugar. Others lower how much insulin your body needs. Some can slow how quickly your body digests sugars. And some can remove extra glucose through your urine. Some of these medicines are:  · Alpha-glucosidase inhibitors. These keep starches from breaking down. This means that they lower the amount of glucose absorbed when you eat. They do not help your body make more insulin. So they will not cause low blood sugar unless they are used with other medicines for diabetes. They include acarbose and miglitol. · DPP-4 inhibitors. These help your body increase the level of insulin after eating. They also help your body make less of a hormone that raises blood sugar. They include linagliptin, saxagliptin, and sitagliptin. · Incretin hormones (GLP-1 receptor agonists). Your body makes a protein that can raise your insulin level, lower your blood sugar, and make you less hungry. You can inject hormone medicines that work the same way as this protein. They include exenatide and liraglutide. · Meglitinides. These help your body release insulin. They also help slow how your body digests sugars. In this way, they prevent your blood sugar from rising too fast after you eat. They include nateglinide and repaglinide. · Metformin. This lowers how much glucose your liver makes. And it helps you respond better to insulin. It also lowers the amount of stored sugar that your liver releases when you are not eating. · Sodium glucose co-transporter 2 inhibitors (SGLT2 inhibitors). These help to remove extra glucose through your urine. They may also help some people lose weight. They include canagliflozin, dapagliflozin, and empagliflozin. · Sulfonylureas. These help your body release more insulin. Some work for many hours and can cause low blood sugar if you don't eat as you expected. They include glipizide and glyburide. · Thiazolidinediones. These reduce the amount of blood glucose. They also help you respond better to insulin. They include pioglitazone and rosiglitazone. You may need to take more than one medicine for diabetes. Two or more medicines may work better to lower your blood sugar level than just one medicine. Follow-up care is a key part of your treatment and safety. Be sure to make and go to all appointments, and call your doctor if you are having problems. It's also a good idea to know your test results and keep a list of the medicines you take. How can you care for yourself at home? · Eat a healthy diet. Get some exercise each day. This may help you to reduce how much medicine you need. · Do not take other prescription or over-the-counter medicines, vitamins, herbal products, or supplements without talking to your doctor first. Some medicines for type 2 diabetes can cause problems with other medicines or supplements. · Tell your doctor if you plan to get pregnant. Some of these drugs are not safe for pregnant women. · Be safe with medicines. Take your medicines exactly as prescribed. Meglitinides or sulfonylureas can cause your blood sugar to drop very low. Call your doctor if you think you are having a problem with your medicine. · Check your blood sugar levels often. You can use a glucose monitor. Keeping track can help you know how certain foods, activities, and medicines affect your blood sugar. And it can help you keep your blood sugar from getting so low that it's not safe. When should you call for help? Call 911 anytime you think you may need emergency care. For example, call if:  · You passed out (lost consciousness), or you suddenly become very sleepy or confused.  (You may have very low blood sugar.)  · You have symptoms of high blood sugar, such as:  ¨ Blurred vision. ¨ Trouble staying awake or being woken up. ¨ Fast, deep breathing. ¨ Breath that smells fruity. ¨ Belly pain, not feeling hungry, and vomiting. ¨ Feeling confused. Call your doctor now or seek immediate medical care if:  · You are sick and cannot control your blood sugar. · You have been vomiting or have had diarrhea for more than 6 hours. · Your blood sugar stays higher than the level your doctor has set for you. · You have symptoms of low blood sugar, such as:  ¨ Sweating. ¨ Feeling nervous, shaky, and weak. ¨ Extreme hunger and slight nausea. ¨ Dizziness and headache. ¨ Blurred vision. ¨ Confusion. Watch closely for changes in your health, and be sure to contact your doctor if:  · You have a hard time knowing when your blood sugar is low. · You have trouble keeping your blood sugar in the target range. · You often have problems controlling your blood sugar. · You have symptoms of long-term diabetes problems, such as:  ¨ New vision changes. ¨ New pain, numbness, or tingling in your hands or feet. ¨ Skin problems. Where can you learn more? Go to http://yonatan-bianca.info/. Enter H153 in the search box to learn more about \"Noninsulin Medicines for Type 2 Diabetes: Care Instructions. \"  Current as of: August 3, 2016  Content Version: 11.2  © 5271-1602 [employer]. Care instructions adapted under license by [employer] (which disclaims liability or warranty for this information). If you have questions about a medical condition or this instruction, always ask your healthcare professional. [place] [address] any warranty or liability for your use of this information.

## 2017-05-02 PROBLEM — Z71.89 ACP (ADVANCE CARE PLANNING): Status: ACTIVE | Noted: 2017-05-02

## 2017-05-02 LAB
ALBUMIN SERPL-MCNC: 4.1 G/DL (ref 3.6–4.8)
ALBUMIN/GLOB SERPL: 1.8 {RATIO} (ref 1.2–2.2)
ALP SERPL-CCNC: 93 IU/L (ref 39–117)
ALT SERPL-CCNC: 17 IU/L (ref 0–32)
AST SERPL-CCNC: 21 IU/L (ref 0–40)
BILIRUB SERPL-MCNC: 0.6 MG/DL (ref 0–1.2)
BUN SERPL-MCNC: 13 MG/DL (ref 8–27)
BUN/CREAT SERPL: 15 (ref 12–28)
CALCIUM SERPL-MCNC: 9.2 MG/DL (ref 8.7–10.3)
CHLORIDE SERPL-SCNC: 100 MMOL/L (ref 96–106)
CHOLEST SERPL-MCNC: 178 MG/DL (ref 100–199)
CO2 SERPL-SCNC: 23 MMOL/L (ref 18–29)
CREAT SERPL-MCNC: 0.86 MG/DL (ref 0.57–1)
EST. AVERAGE GLUCOSE BLD GHB EST-MCNC: 114 MG/DL
GLOBULIN SER CALC-MCNC: 2.3 G/DL (ref 1.5–4.5)
GLUCOSE SERPL-MCNC: 93 MG/DL (ref 65–99)
HBA1C MFR BLD: 5.6 % (ref 4.8–5.6)
HDLC SERPL-MCNC: 70 MG/DL
INTERPRETATION, 910389: NORMAL
LDLC SERPL CALC-MCNC: 89 MG/DL (ref 0–99)
POTASSIUM SERPL-SCNC: 4.2 MMOL/L (ref 3.5–5.2)
PROT SERPL-MCNC: 6.4 G/DL (ref 6–8.5)
SODIUM SERPL-SCNC: 141 MMOL/L (ref 134–144)
TRIGL SERPL-MCNC: 95 MG/DL (ref 0–149)
VLDLC SERPL CALC-MCNC: 19 MG/DL (ref 5–40)

## 2017-05-02 NOTE — PROGRESS NOTES
Sampson Kelsey is a 78 y.o. female and presents for annual Medicare Wellness Visit. Problem List: Reviewed with patient and discussed risk factors. Patient Active Problem List   Diagnosis Code    Dyspareunia [id number]    Calculus of kidney N20.0    Obesity (BMI 30-39. 9) E66.9    Situational stress-elderly Mom in [place] F43.9    History of motion sickness Z87.898    Incomplete rectal prolapse- asymptomatic-2015- found on GYN exam K62.3    Diabetes mellitus without complication ([facility] [address]- 3091 E11.9    Essential hypertension I10    Allergic rhinitis due to pollen J30.1    Pure hypercholesterolemia E78.00    Gastroesophageal reflux disease without esophagitis K21.9    Benign papule- anterior to right pinna12/2015 R23.8       Current medical providers:  Patient Care Team:  Denisse Swanson MD as PCP - General    PSH: Reviewed with patient  Past Surgical History:   Procedure Laterality Date    ENDOSCOPY, COLON, DIAGNOSTIC  2007    repeat in 2017    HX [address] HX COLONOSCOPY  2007    repeat in 2017    HX KNEE ARTHROSCOPY Left 02/15/2017    meniscus        SH: Reviewed with patient  Social History   Substance Use Topics    Smoking status: Never Smoker    Smokeless tobacco: Never Used    Alcohol use Yes      Comment: occ       FH: Reviewed with patient  Family History   Problem Relation Age of Onset    Hypertension Mother     Elevated Lipids Mother     Cancer Other      melanoma in daughter       Medications/Allergies: Reviewed with patient  Current Outpatient Prescriptions on File Prior to Visit   Medication Sig Dispense Refill    atenolol (TENORMIN) 50 mg tablet Take 1 tablet by mouth  daily 90 Tab 1    pravastatin (PRAVACHOL) 20 mg tablet Take 1 tablet by mouth  nightly 90 Tab 1    famotidine (PEPCID) 20 mg tablet Take 20 mg by mouth two (2) times a day.  multivitamin (ONE A DAY) tablet Take 1 Tab by mouth daily.       LACTOBACILLUS ACIDOPHILUS (PROBIOTIC PO) Take  by mouth daily.      naproxen sodium (ALEVE) 220 mg tablet Take 220-440 mg by mouth two (2) times daily (with meals). Prn      acetaminophen (TYLENOL EXTRA STRENGTH) 500 mg tablet Take 2 Tabs by mouth every six (6) hours as needed for Pain. No current facility-administered medications on file prior to visit. Allergies   Allergen Reactions    Doxycycline Rash    Sulfa (Sulfonamide Antibiotics) Not Reported This Time    Tetracycline Not Reported This Time       Objective:  Visit Vitals    /84 (BP 1 Location: Right arm, BP Patient Position: Sitting)    Pulse (!) 51    Temp 97.8 °F (36.6 °C) (Oral)    Resp 18    Ht 5' 6\" (1.676 m)    Wt 222 lb 6.4 oz (100.9 kg)    SpO2 98%    BMI 35.9 kg/m2    Body mass index is 35.9 kg/(m^2). Assessment of cognitive impairment: Alert and oriented x 3    Depression Screen:   PHQ 2 / 9, over the last two weeks 1/23/2017   Little interest or pleasure in doing things Not at all   Feeling down, depressed or hopeless Not at all   Total Score PHQ 2 0       Fall Risk Assessment:    Fall Risk Assessment, last 12 mths 1/23/2017   Able to walk? Yes   Fall in past 12 months? Yes   Fall with injury? Yes   Number of falls in past 12 months 1   Fall Risk Score 2       Functional Ability:   Does the patient exhibit a steady gait? yes   How long did it take the patient to get up and walk from a sitting position? 34-5 seconds     Is the patient self reliant?  (ie can do own laundry, meals, household chores)  yes     Does the patient handle his/her own medications? yes     Does the patient handle his/her own money? yes     Is the patients home safe (ie good lighting, handrails on stairs and bath, etc.)? yes     Did you notice or did patient express any hearing difficulties? no     Did you notice or did patient express any vision difficulties?   no     Were distance and reading eye charts used? Yes.   Patient's  full eye exam by an ophthalmologist every 2 years is unremarkable: no glaucoma or macular degeneration. Advance Care Planning:   Patient was offered the opportunity to discuss advance care planning:  yes     Does patient have an Advance Directive:  Yes. I reviewed advanced directive information and written information given to patient; patient to make follow up appointment with a NN for any questions,to review choices made for health care, agent, and anatomical gifts that are on the advanced directive at home. If no, did you provide information on Caring Connections? yes       Plan:    Advance Care Planning (ACP) Provider Note - Comprehensive     Date of ACP Conversation: 05/02/17  Persons included in Conversation:  patient  Length of ACP Conversation in minutes:  <16 minutes (Non-Billable)    Authorized Decision Maker (if patient is incapable of making informed decisions):    This person is:  Healthcare Agent/Medical Power of  under Advance Directive          General ACP for ALL Patients with Decision Making Capacity:   Importance of advance care planning, including choosing a healthcare agent to communicate patient's healthcare decisions if patient lost the ability to make decisions, such as after a sudden illness or accident  Understanding of the healthcare agent role was assessed and information provided    Review of Existing Advance Directive:  will obtain copty of advanced directive has at home- wishes no changes    For Serious or Chronic Illness:  No known illness    Interventions Provided:  Recommended communicating the plan and making copies for the healthcare agent, personal physician, and others as appropriate (e.g., health system)  Recommended review of completed ACP document annually or upon change in health status    Orders Placed This Encounter    HEMOGLOBIN A1C WITH EAG    LIPID PANEL    METABOLIC PANEL, COMPREHENSIVE    levocetirizine (XYZAL) 5 mg tablet       Health Maintenance   Topic Date Due    MEDICARE YEARLY EXAM 12/29/2016    COLONOSCOPY  07/11/2017    EYE EXAM RETINAL OR DILATED Q1  06/13/2017    HEMOGLOBIN A1C Q6M  07/23/2017    INFLUENZA AGE 9 TO ADULT  08/01/2017    MICROALBUMIN Q1  01/23/2018    LIPID PANEL Q1  01/23/2018    FOOT EXAM Q1  02/12/2018    GLAUCOMA SCREENING Q2Y  06/13/2018    BREAST CANCER SCRN MAMMOGRAM  11/07/2018    DTaP/Tdap/Td series (2 - Td) 10/25/2020    Hepatitis C Screening  Addressed    OSTEOPOROSIS SCREENING (DEXA)  Completed    ZOSTER VACCINE AGE 60>  Completed    Pneumococcal 65+ Low/Medium Risk  Completed       *Patient verbalized understanding and agreement with the plan. A copy of the After Visit Summary with personalized health plan was given to the patient today.

## 2017-06-07 DIAGNOSIS — I10 ESSENTIAL HYPERTENSION: ICD-10-CM

## 2017-06-07 RX ORDER — PRAVASTATIN SODIUM 20 MG/1
TABLET ORAL
Qty: 90 TAB | Refills: 1 | Status: SHIPPED | OUTPATIENT
Start: 2017-06-07 | End: 2017-12-13 | Stop reason: SDUPTHER

## 2017-06-07 RX ORDER — ATENOLOL 50 MG/1
TABLET ORAL
Qty: 90 TAB | Refills: 1 | Status: SHIPPED | OUTPATIENT
Start: 2017-06-07 | End: 2017-10-05 | Stop reason: SDUPTHER

## 2017-08-08 RX ORDER — SCOPALAMINE 1 MG/3D
PATCH, EXTENDED RELEASE TRANSDERMAL
Qty: 28 PATCH | Refills: 0 | Status: SHIPPED | OUTPATIENT
Start: 2017-08-08 | End: 2018-08-28 | Stop reason: ALTCHOICE

## 2017-10-02 RX ORDER — SODIUM, POTASSIUM,MAG SULFATES 17.5-3.13G
177 SOLUTION, RECONSTITUTED, ORAL ORAL
COMMUNITY
End: 2017-12-18 | Stop reason: ALTCHOICE

## 2017-10-02 RX ORDER — CETIRIZINE HCL 10 MG
10 TABLET ORAL DAILY
COMMUNITY
End: 2022-06-10

## 2017-10-04 ENCOUNTER — ANESTHESIA EVENT (OUTPATIENT)
Dept: ENDOSCOPY | Age: 67
End: 2017-10-04
Payer: MEDICARE

## 2017-10-04 ENCOUNTER — HOSPITAL ENCOUNTER (OUTPATIENT)
Age: 67
Setting detail: OUTPATIENT SURGERY
Discharge: HOME | End: 2017-10-04
Attending: SPECIALIST | Admitting: SPECIALIST
Payer: MEDICARE

## 2017-10-04 ENCOUNTER — ANESTHESIA (OUTPATIENT)
Dept: ENDOSCOPY | Age: 67
End: 2017-10-04
Payer: MEDICARE

## 2017-10-04 VITALS
HEIGHT: 66 IN | BODY MASS INDEX: 35.23 KG/M2 | DIASTOLIC BLOOD PRESSURE: 78 MMHG | RESPIRATION RATE: 20 BRPM | WEIGHT: 219.19 LBS | TEMPERATURE: 98.2 F | SYSTOLIC BLOOD PRESSURE: 153 MMHG | HEART RATE: 55 BPM | OXYGEN SATURATION: 100 %

## 2017-10-04 PROCEDURE — 74011250636 HC RX REV CODE- 250/636: Performed by: SPECIALIST

## 2017-10-04 PROCEDURE — 74011000250 HC RX REV CODE- 250

## 2017-10-04 PROCEDURE — 76060000031 HC ANESTHESIA FIRST 0.5 HR: Performed by: SPECIALIST

## 2017-10-04 PROCEDURE — 74011250637 HC RX REV CODE- 250/637: Performed by: ANESTHESIOLOGY

## 2017-10-04 PROCEDURE — 74011250636 HC RX REV CODE- 250/636

## 2017-10-04 PROCEDURE — 76040000019: Performed by: SPECIALIST

## 2017-10-04 RX ORDER — SODIUM CHLORIDE 0.9 % (FLUSH) 0.9 %
5-10 SYRINGE (ML) INJECTION AS NEEDED
Status: DISCONTINUED | OUTPATIENT
Start: 2017-10-04 | End: 2017-10-04 | Stop reason: HOSPADM

## 2017-10-04 RX ORDER — DEXTROMETHORPHAN/PSEUDOEPHED 2.5-7.5/.8
1.2 DROPS ORAL
Status: DISCONTINUED | OUTPATIENT
Start: 2017-10-04 | End: 2017-10-04 | Stop reason: HOSPADM

## 2017-10-04 RX ORDER — NALOXONE HYDROCHLORIDE 0.4 MG/ML
0.4 INJECTION, SOLUTION INTRAMUSCULAR; INTRAVENOUS; SUBCUTANEOUS
Status: DISCONTINUED | OUTPATIENT
Start: 2017-10-04 | End: 2017-10-04 | Stop reason: HOSPADM

## 2017-10-04 RX ORDER — ATROPINE SULFATE 0.1 MG/ML
0.5 INJECTION INTRAVENOUS
Status: DISCONTINUED | OUTPATIENT
Start: 2017-10-04 | End: 2017-10-04 | Stop reason: HOSPADM

## 2017-10-04 RX ORDER — EPINEPHRINE 0.1 MG/ML
1 INJECTION INTRACARDIAC; INTRAVENOUS
Status: DISCONTINUED | OUTPATIENT
Start: 2017-10-04 | End: 2017-10-04 | Stop reason: HOSPADM

## 2017-10-04 RX ORDER — LIDOCAINE HYDROCHLORIDE 20 MG/ML
INJECTION, SOLUTION EPIDURAL; INFILTRATION; INTRACAUDAL; PERINEURAL AS NEEDED
Status: DISCONTINUED | OUTPATIENT
Start: 2017-10-04 | End: 2017-10-04 | Stop reason: HOSPADM

## 2017-10-04 RX ORDER — FLUMAZENIL 0.1 MG/ML
0.2 INJECTION INTRAVENOUS
Status: DISCONTINUED | OUTPATIENT
Start: 2017-10-04 | End: 2017-10-04 | Stop reason: HOSPADM

## 2017-10-04 RX ORDER — PROPOFOL 10 MG/ML
INJECTION, EMULSION INTRAVENOUS AS NEEDED
Status: DISCONTINUED | OUTPATIENT
Start: 2017-10-04 | End: 2017-10-04 | Stop reason: HOSPADM

## 2017-10-04 RX ORDER — SODIUM CHLORIDE 9 MG/ML
50 INJECTION, SOLUTION INTRAVENOUS CONTINUOUS
Status: DISCONTINUED | OUTPATIENT
Start: 2017-10-04 | End: 2017-10-04 | Stop reason: HOSPADM

## 2017-10-04 RX ORDER — SODIUM CHLORIDE 0.9 % (FLUSH) 0.9 %
5-10 SYRINGE (ML) INJECTION EVERY 8 HOURS
Status: DISCONTINUED | OUTPATIENT
Start: 2017-10-04 | End: 2017-10-04 | Stop reason: HOSPADM

## 2017-10-04 RX ORDER — ASPIRIN 81 MG/1
81 TABLET ORAL DAILY
COMMUNITY
End: 2020-01-20 | Stop reason: ALTCHOICE

## 2017-10-04 RX ADMIN — SIMETHICONE 80 MG: 20 SUSPENSION/ DROPS ORAL at 12:44

## 2017-10-04 RX ADMIN — PROPOFOL 150 MG: 10 INJECTION, EMULSION INTRAVENOUS at 12:43

## 2017-10-04 RX ADMIN — PROPOFOL 30 MG: 10 INJECTION, EMULSION INTRAVENOUS at 12:27

## 2017-10-04 RX ADMIN — SODIUM CHLORIDE 50 ML/HR: 900 INJECTION, SOLUTION INTRAVENOUS at 12:19

## 2017-10-04 RX ADMIN — PROPOFOL 70 MG: 10 INJECTION, EMULSION INTRAVENOUS at 12:23

## 2017-10-04 RX ADMIN — PROPOFOL 40 MG: 10 INJECTION, EMULSION INTRAVENOUS at 12:25

## 2017-10-04 RX ADMIN — LIDOCAINE HYDROCHLORIDE 40 MG: 20 INJECTION, SOLUTION EPIDURAL; INFILTRATION; INTRACAUDAL; PERINEURAL at 12:23

## 2017-10-04 NOTE — PERIOP NOTES
Anesthesia reports 290 mg Propofol, 40 mg Lidocaine and 350 mL NS given during procedure. Received report from anesthesia staff on vital signs and status of patient.

## 2017-10-04 NOTE — IP AVS SNAPSHOT
[address] [facility] 
[phone] Patient: Cooper Kaur MRN: [id number] CPY:[birth year] You are allergic to the following Allergen Reactions Doxycycline Rash  
    
 Sulfa (Sulfonamide Antibiotics) Rash Tetracycline Rash Recent Documentation Height Weight BMI OB Status Smoking Status 1.676 m 99.4 kg 35.38 kg/m2 Postmenopausal Never Smoker Emergency Contacts Name Discharge Info Relation Home Work Mobile Rafa Lopez DISCHARGE CAREGIVER [3] Spouse [3] [phone] About your hospitalization You were admitted on:  October 4, 2017 You last received care in the:  [facility] ENDOSCOPY You were discharged on:  October 4, 2017 Unit phone number:  [phone] Why you were hospitalized Your primary diagnosis was:  Not on File Providers Seen During Your Hospitalizations Provider Role Specialty Primary office phone Isreal Spurling, MD Attending Provider Gastroenterology [phone] Your Primary Care Physician (PCP) Primary Care Physician Office Phone Office Fax Richard Argenis [phone] Follow-up Information None Current Discharge Medication List  
  
CONTINUE these medications which have NOT CHANGED Dose & Instructions Dispensing Information Comments Morning Noon Evening Bedtime ALEVE 220 mg tablet Generic drug:  naproxen sodium Your last dose was: Your next dose is:    
   
   
 Dose:  220-440 mg Take 220-440 mg by mouth two (2) times daily (with meals). Prn Refills:  0  
     
   
   
   
  
 aspirin delayed-release 81 mg tablet Your last dose was: Your next dose is:    
   
   
 Dose:  81 mg Take 81 mg by mouth daily. Refills:  0  
     
   
   
   
  
 atenolol 50 mg tablet Commonly known as:  TENORMIN Your last dose was: Your next dose is: Take 1 tablet by mouth  daily Quantity:  90 Tab Refills:  1  
     
   
   
   
  
 multivitamin tablet Commonly known as:  ONE A DAY Your last dose was: Your next dose is:    
   
   
 Dose:  1 Tab Take 1 Tab by mouth daily. Refills:  0 PEPCID 20 mg tablet Generic drug:  famotidine Your last dose was: Your next dose is:    
   
   
 Dose:  20 mg Take 20 mg by mouth two (2) times a day. Refills:  0  
     
   
   
   
  
 pravastatin 20 mg tablet Commonly known as:  PRAVACHOL Your last dose was: Your next dose is: Take 1 tablet by mouth  nightly Quantity:  90 Tab Refills:  1 PROBIOTIC PO Your last dose was: Your next dose is: Take  by mouth daily. Refills:  0 SUPREP BOWEL PREP KIT 17.5-3.13-1.6 gram Solr oral solution Generic drug:  sodium-potassium-mag sulfate Your last dose was: Your next dose is:    
   
   
 Dose:  177 mL Take 177 mL by mouth. Refills:  0  
     
   
   
   
  
 TRANSDERM-SCOP 1.5 mg (1 mg over 3 days) Pt3d Generic drug:  scopolamine Your last dose was: Your next dose is:    
   
   
 Apply 1 patch transdermally every 72 hours as directed Quantity:  28 Patch Refills:  0  
     
   
   
   
  
 TYLENOL EXTRA STRENGTH 500 mg tablet Generic drug:  acetaminophen Your last dose was: Your next dose is:    
   
   
 Dose:  1000 mg Take 2 Tabs by mouth every six (6) hours as needed for Pain. Refills:  0 ZyrTEC 10 mg tablet Generic drug:  cetirizine Your last dose was: Your next dose is:    
   
   
 Dose:  10 mg Take 10 mg by mouth daily. Refills:  0 Discharge Instructions Diane Wilson [id number] 
1950 COLON DISCHARGE INSTRUCTIONS Discomfort: 
Redness at IV site- apply warm compress to area; if redness or soreness persist- contact your physician There may be a slight amount of blood passed from the rectum Gaseous discomfort- walking, belching will help relieve any discomfort You may not operate a vehicle for 12 hours You may not engage in an occupation involving machinery or appliances for rest of today You may not drink alcoholic beverages for at least 12 hours Avoid making any critical decisions for at least 24 hour DIET: 
 Regular diet.  however -  remember your colon is empty and a heavy meal will produce gas. Avoid these foods:  vegetables, fried / greasy foods, carbonated drinks for today. MEDICATIONS: 
  
 
 Regarding Aspirin or Nonsteroidal medications, please see below. ACTIVITY: 
You may resume your normal daily activities it is recommended that you spend the remainder of the day resting -  avoid any strenuous activity. CALL M.D. ANY SIGN OF: Increasing pain, nausea, vomiting Abdominal distension (swelling) New increased bleeding (oral or rectal) Fever (chills) Pain in chest area Bloody discharge from nose or mouth Shortness of breath Tylenol as needed for pain. Follow-up Instructions: 
 Call Dr. Blake Alvarez for questions about procedure at telephone #  [phone] Repeat Colonoscopy in 10 years. Discharge Orders None ACO Transitions of Care Introducing [employer] [address] Tonya Taran offers a voluntary care coordination program to provide high quality service and care to [facility] fee-for-service beneficiaries. Randy Espinal was designed to help you enhance your health and well-being through the following services: ? Transitions of Care  support for individuals who are transitioning from one care setting to another (example: Hospital to home). ?  Chronic and Complex Care Coordination  support for individuals and caregivers of those with serious or chronic illnesses or with more than one chronic (ongoing) condition and those who take a number of different medications. If you meet specific medical criteria, a [address] [place] [address] Rd may call you directly to coordinate your care with your primary care physician and your other care providers. For questions about the [facility] programs, please, contact your physicians office. For general questions or additional information about Accountable Care Organizations: 
Please visit www.medicare.gov/acos. html or call 1-800-MEDICARE (4-393.664.3860) TTY users should call 2-594.490.4582. Introducing \[facility] [place] [facility]\"" & HEALTH SERVICES! Dear Verdis Gottron: Thank you for requesting a [employer] account. Our records indicate that you already have an active [employer] account. You can access your account anytime at [email or web address] Did you know that you can access your hospital and ER discharge instructions at any time in [employer]? You can also review all of your test results from your hospital stay or ER visit. Additional Information If you have questions, please visit the Frequently Asked Questions section of the [employer] website at [email or web address] Remember, [employer] is NOT to be used for urgent needs. For medical emergencies, dial 911. Now available from your iPhone and Android! General Information Please provide this summary of care documentation to your next provider. Patient Signature:  ____________________________________________________________ Date:  ____________________________________________________________  
  
Jamshid Mackenzie Provider Signature:  ____________________________________________________________ Date:  ____________________________________________________________

## 2017-10-04 NOTE — PROCEDURES
Colonoscopy Procedure Note    Indications:   Screening colonoscopy    Referring Physician: Iilana Bowens MD  Anesthesia/Sedation: MAC anesthesia Propofol  Endoscopist:  Dr. Amelia Parekh    Procedure in Detail:  Informed consent was obtained for the procedure, including sedation. Risks of perforation, hemorrhage, adverse drug reaction, and aspiration were discussed. The patient was placed in the left lateral decubitus position. Based on the pre-procedure assessment, including review of the patient's medical history, medications, allergies, and review of systems, she had been deemed to be an appropriate candidate for moderate sedation; she was therefore sedated with the medications listed above. The patient was monitored continuously with ECG tracing, pulse oximetry, blood pressure monitoring, and direct observations. A rectal examination was performed. The [id number] was inserted into the rectum and advanced under direct vision to the cecum, which was identified by the ileocecal valve and appendiceal orifice. The quality of the colonic preparation was adequate. A careful inspection was made as the colonoscope was withdrawn, including a retroflexed view of the rectum; findings and interventions are described below. Appropriate photodocumentation was obtained. Findings:     1. Scope advanced to the cecum. 2.  Preparation was adequate. 3.  Normal mucosa throughout. 4.  No polyps seen. 5.  Diffuse diverticulosis of moderate severity in sigmoid colon. Therapies:  none    Specimen:  none     Complications: None were encountered during the procedure. EBL: none.     Recommendations:   -Repeat Colonoscopy in 10 years    Signed By: Gilmer Niño MD                        October 4, 2017

## 2017-10-04 NOTE — H&P
Gastroenterology Outpatient History and Physical    Patient: Kesha Kirk    Physician: Phillip Stafford MD    Vital Signs: There were no vitals taken for this visit. Allergies: Allergies   Allergen Reactions    Doxycycline Rash    Sulfa (Sulfonamide Antibiotics) Rash    Tetracycline Rash       Chief Complaint: Screening    History of Present Illness: 74 yo F for screening colonoscopy.     Justification for Procedure: above    History:  Past Medical History:   Diagnosis Date    AR (allergic rhinitis)     Calculus of kidney     Dyspareunia     GERD (gastroesophageal reflux disease) 11/30/2010    HBP (high blood pressure) 8/9/2010    History of motion sickness 3/29/2014    Incomplete rectal prolapse- asymptomatic-2015- found on GYN exam 8/6/2015    Obesity (BMI 30-39.9) 6/30/2013    Situational stress-elderly Mom in [facility] 3/29/2014      Past Surgical History:   Procedure Laterality Date    ENDOSCOPY, COLON, DIAGNOSTIC  2007    repeat in 2017    HX CHOLECYSTECTOMY  1987    HX COLONOSCOPY  2007    repeat in 2017    HX KNEE ARTHROSCOPY Left 02/15/2017    meniscus      Social History     Social History    Marital status:      Spouse name: N/A    Number of children: N/A    Years of education: N/A     Social History Main Topics    Smoking status: Never Smoker    Smokeless tobacco: Never Used    Alcohol use Yes      Comment: occ    Drug use: No    Sexual activity: Yes     Other Topics Concern     Service No    Blood Transfusions No    Caffeine Concern No    Occupational Exposure No    Hobby Hazards No    Sleep Concern No    Stress Concern No    Weight Concern Yes    Special Diet No    Back Care No    Exercise Yes     walking    Bike Helmet No     does not ride   [place] Yes    Self-Exams Yes     Social History Narrative      Family History   Problem Relation Age of Onset    Hypertension Mother     Elevated Lipids Mother     Cancer Other      melanoma in daughter       Medications:   Prior to Admission medications    Medication Sig Start Date End Date Taking? Authorizing Provider   aspirin delayed-release 81 mg tablet Take 81 mg by mouth daily. Yes Historical Provider   cetirizine (ZYRTEC) 10 mg tablet Take 10 mg by mouth daily. Yes Historical Provider   sodium-potassium-mag sulfate (SUPREP BOWEL PREP KIT) 17.5-3.13-1.6 gram solr oral solution Take 177 mL by mouth. Yes Historical Provider   atenolol (TENORMIN) 50 mg tablet Take 1 tablet by mouth  daily 6/7/17  Yes Kayy Post MD   pravastatin (PRAVACHOL) 20 mg tablet Take 1 tablet by mouth  nightly 6/7/17  Yes Kayy Post MD   famotidine (PEPCID) 20 mg tablet Take 20 mg by mouth two (2) times a day. Yes Historical Provider   multivitamin (ONE A DAY) tablet Take 1 Tab by mouth daily. Yes Historical Provider   LACTOBACILLUS ACIDOPHILUS (PROBIOTIC PO) Take  by mouth daily. Yes Historical Provider   naproxen sodium (ALEVE) 220 mg tablet Take 220-440 mg by mouth two (2) times daily (with meals). Prn   Yes Kayy Post MD   TRANSDERM-SCOP 1.5 mg (1 mg over 3 days) pt3d Apply 1 patch transdermally every 72 hours as directed 8/8/17   Kayy Post MD   acetaminophen (TYLENOL EXTRA STRENGTH) 500 mg tablet Take 2 Tabs by mouth every six (6) hours as needed for Pain. Kayy Post MD       Physical Exam:   General: alert, no distress   HEENT: Head: Normocephalic, no lesions, without obvious abnormality. Heart: regular rate and rhythm, S1, S2 normal, no murmur, click, rub or gallop   Lungs: chest clear, no wheezing, rales, normal symmetric air entry   Abdominal: soft, nontender, nondistended, + BS   Neurological: Grossly normal   Extremities: extremities normal, atraumatic, no cyanosis or edema     Findings/Diagnosis: Screening colonoscopy.     Plan of Care/Planned Procedure: Colonoscopy with MAC    Signed By: Daysi Ortiz MD     October 4, 2017

## 2017-10-04 NOTE — ANESTHESIA POSTPROCEDURE EVALUATION
Post-Anesthesia Evaluation and Assessment    Patient: Governor Santos MRN: [id number]  SSN: [id number]    YOB: 1950  Age: 75 y.o. Sex: female       Cardiovascular Function/Vital Signs  Visit Vitals    /66    Pulse (!) 56    Temp 36.8 °C (98.2 °F)    Resp 15    Ht 5' 6\" (1.676 m)    Wt 99.4 kg (219 lb 3 oz)    SpO2 100%    BMI 35.38 kg/m2       Patient is status post total IV anesthesia anesthesia for Procedure(s):  COLONOSCOPY. Nausea/Vomiting: None    Postoperative hydration reviewed and adequate. Pain:  Pain Scale 1: Numeric (0 - 10) (10/04/17 1209)  Pain Intensity 1: 0 (10/04/17 1209)   Managed    Neurological Status: At baseline    Mental Status and Level of Consciousness: Arousable    Pulmonary Status:   O2 Device: CO2 nasal cannula (10/04/17 1246)   Adequate oxygenation and airway patent    Complications related to anesthesia: None    Post-anesthesia assessment completed.  No concerns    Signed By: Noni Beckford MD     October 4, 2017

## 2017-10-04 NOTE — ROUTINE PROCESS
[facility]  1950  [id number]    Situation:  Verbal report received from: Estefania Sosa RN  Procedure: Procedure(s):  COLONOSCOPY    Background:    Preoperative diagnosis: SCREENING   Postoperative diagnosis:   diverticulosis,     :  Dr. Prasanth Roblero  Assistant(s): Endoscopy Technician-1: Saulo Duque  Endoscopy RN-1: Alex Silverman    Specimens: * No specimens in log *  H. Pylori  no    Assessment:  Intra-procedure medications     Anesthesia gave intra-procedure sedation and medications, see anesthesia flow sheet yes    Intravenous fluids: NS@ KVO     Vital signs stable       Abdominal assessment: round and soft       Recommendation:  Discharge patient per MD order  .     Family or Friend  Rafa  Permission to share finding with family or friend yes

## 2017-10-04 NOTE — ANESTHESIA PREPROCEDURE EVALUATION
Anesthetic History   No history of anesthetic complications            Review of Systems / Medical History  Patient summary reviewed, nursing notes reviewed and pertinent labs reviewed    Pulmonary  Within defined limits                 Neuro/Psych         Psychiatric history     Cardiovascular    Hypertension          Hyperlipidemia    Exercise tolerance: >4 METS  Comments:  Hx bradycardia     GI/Hepatic/Renal     GERD    Renal disease: stones       Endo/Other    Diabetes    Obesity     Other Findings   Comments: Dyspareunia   allergic rhinitis  Hx motion sickness  Incomplete rectal prolapse- asymptomatic         Physical Exam    Airway  Mallampati: II    Neck ROM: normal range of motion   Mouth opening: Normal     Cardiovascular  Regular rate and rhythm,  S1 and S2 normal,  no murmur, click, rub, or gallop             Dental  No notable dental hx       Pulmonary  Breath sounds clear to auscultation               Abdominal  GI exam deferred       Other Findings            Anesthetic Plan    ASA: 2  Anesthesia type: total IV anesthesia          Induction: Intravenous  Anesthetic plan and risks discussed with: Patient

## 2017-12-13 ENCOUNTER — OFFICE VISIT (OUTPATIENT)
Dept: FAMILY MEDICINE CLINIC | Age: 67
End: 2017-12-13

## 2017-12-13 ENCOUNTER — HOSPITAL ENCOUNTER (OUTPATIENT)
Dept: LAB | Age: 67
Discharge: HOME | End: 2017-12-13
Payer: MEDICARE

## 2017-12-13 VITALS
OXYGEN SATURATION: 98 % | TEMPERATURE: 97.9 F | BODY MASS INDEX: 36.45 KG/M2 | HEART RATE: 56 BPM | HEIGHT: 66 IN | SYSTOLIC BLOOD PRESSURE: 135 MMHG | DIASTOLIC BLOOD PRESSURE: 77 MMHG | WEIGHT: 226.8 LBS | RESPIRATION RATE: 18 BRPM

## 2017-12-13 DIAGNOSIS — E78.00 PURE HYPERCHOLESTEROLEMIA: ICD-10-CM

## 2017-12-13 DIAGNOSIS — I10 ESSENTIAL HYPERTENSION: ICD-10-CM

## 2017-12-13 DIAGNOSIS — K21.9 GASTROESOPHAGEAL REFLUX DISEASE WITHOUT ESOPHAGITIS: ICD-10-CM

## 2017-12-13 DIAGNOSIS — R68.89 COLD INTOLERANCE: ICD-10-CM

## 2017-12-13 DIAGNOSIS — F43.9 SITUATIONAL STRESS: Chronic | ICD-10-CM

## 2017-12-13 DIAGNOSIS — E11.9 DIABETES MELLITUS WITHOUT COMPLICATION (HCC): Primary | ICD-10-CM

## 2017-12-13 PROCEDURE — 82043 UR ALBUMIN QUANTITATIVE: CPT

## 2017-12-13 PROCEDURE — 80061 LIPID PANEL: CPT

## 2017-12-13 PROCEDURE — 80053 COMPREHEN METABOLIC PANEL: CPT

## 2017-12-13 PROCEDURE — 36415 COLL VENOUS BLD VENIPUNCTURE: CPT

## 2017-12-13 PROCEDURE — 83036 HEMOGLOBIN GLYCOSYLATED A1C: CPT

## 2017-12-13 PROCEDURE — 84439 ASSAY OF FREE THYROXINE: CPT

## 2017-12-13 PROCEDURE — 84443 ASSAY THYROID STIM HORMONE: CPT

## 2017-12-13 RX ORDER — PRAVASTATIN SODIUM 20 MG/1
TABLET ORAL
Qty: 90 TAB | Refills: 1 | Status: SHIPPED | OUTPATIENT
Start: 2017-12-13 | End: 2017-12-18 | Stop reason: ALTCHOICE

## 2017-12-13 RX ORDER — OFLOXACIN 3 MG/ML
SOLUTION/ DROPS OPHTHALMIC
COMMUNITY
Start: 2017-12-12 | End: 2017-12-18 | Stop reason: ALTCHOICE

## 2017-12-13 RX ORDER — PREDNISOLONE ACETATE 10 MG/ML
SUSPENSION/ DROPS OPHTHALMIC
COMMUNITY
Start: 2017-12-12 | End: 2017-12-18 | Stop reason: ALTCHOICE

## 2017-12-13 RX ORDER — PRAVASTATIN SODIUM 20 MG/1
TABLET ORAL
Qty: 90 TAB | Refills: 1 | Status: SHIPPED | OUTPATIENT
Start: 2017-12-13 | End: 2018-04-06 | Stop reason: SDUPTHER

## 2017-12-13 NOTE — PATIENT INSTRUCTIONS
Learning About Type 2 Diabetes  What is type 2 diabetes? Insulin is a hormone that helps your body use sugar from your food as energy. Type 2 diabetes happens when your body can't use insulin the right way. Over time, the pancreas can't make enough insulin. If you don't have enough insulin, too much sugar stays in your blood. If you are overweight, get little or no exercise, or have type 2 diabetes in your family, you are more likely to have problems with the way insulin works in your body.  Americans, Hispanics, Native Americans,  Americans, and Pacific Islanders have a higher risk for type 2 diabetes. Type 2 diabetes can be prevented or delayed with a healthy lifestyle, which includes staying at a healthy weight, making smart food choices, and getting regular exercise. What can you expect with type 2 diabetes? Johanna Figueroa keep hearing about how important it is to keep your blood sugar within a target range. That's because over time, high blood sugar can lead to serious problems. It can:  · Harm your eyes, nerves, and kidneys. · Damage your blood vessels, leading to heart disease and stroke. · Reduce blood flow and cause nerve damage to parts of your body, especially your feet. This can cause slow healing and pain when you walk. · Make your immune system weak and less able to fight infections. When people hear the word \"diabetes,\" they often think of problems like these. But daily care and treatment can help prevent or delay these problems. The goal is to keep your blood sugar in a target range. That's the best way to reduce your chance of having more problems from diabetes. What are the symptoms? Some people who have type 2 diabetes may not have any symptoms early on. Many people with the disease don't even know they have it at first. But with time, diabetes starts to cause symptoms. You experience most symptoms of type 2 diabetes when your blood sugar is either too high or too low.   The most common symptoms of high blood sugar include:  · Thirst.  · Frequent urination. · Weight loss. · Blurry vision. The symptoms of low blood sugar include:  · Sweating. · Shakiness. · Weakness. · Hunger. · Confusion. How can you prevent type 2 diabetes? The best way to prevent or delay type 2 diabetes is to adopt healthy habits, which include:  · Staying at a healthy weight. · Exercising regularly. · Eating healthy foods. How is type 2 diabetes treated? If you have type 2 diabetes, here are the most important things you can do. · Take your diabetes medicines. · Check your blood sugar as often as your doctor recommends. Also, get a hemoglobin A1c test at least every 6 months. · Try to eat a variety of foods and to spread carbohydrate throughout the day. Carbohydrate raises blood sugar higher and more quickly than any other nutrient does. Carbohydrate is found in sugar, breads and cereals, fruit, starchy vegetables such as potatoes and corn, and milk and yogurt. · Get at least 30 minutes of exercise on most days of the week. Walking is a good choice. You also may want to do other activities, such as running, swimming, cycling, or playing tennis or team sports. If your doctor says it's okay, do muscle-strengthening exercises at least 2 times a week. · See your doctor for checkups and tests on a regular schedule. · If you have high blood pressure or high cholesterol, take the medicines as prescribed by your doctor. · Do not smoke. Smoking can make health problems worse. This includes problems you might have with type 2 diabetes. If you need help quitting, talk to your doctor about stop-smoking programs and medicines. These can increase your chances of quitting for good. Follow-up care is a key part of your treatment and safety. Be sure to make and go to all appointments, and call your doctor if you are having problems.  It's also a good idea to know your test results and keep a list of the medicines you take. Where can you learn more? Go to http://yonatan-bianca.info/. Enter X340 in the search box to learn more about \"Learning About Type 2 Diabetes. \"  Current as of: March 13, 2017  Content Version: 11.4  © 3397-0371 [employer]. Care instructions adapted under license by [employer] (which disclaims liability or warranty for this information). If you have questions about a medical condition or this instruction, always ask your healthcare professional. [address] any warranty or liability for your use of this information.

## 2017-12-13 NOTE — PROGRESS NOTES
HISTORY OF PRESENT ILLNESS  HPI  Jeanne Goldman is a 75 y.o. female with history of HTN, GERD, hyperlipidemia, and DM-II who presents to office today for fasting follow-up. Pt has not been checking her BP out of office. She states she walks for exercise and goes to the gym around 3 times a week, often using an exercise bike. She denies lightheadedness. She denies unusual SOB and chest pain. Pt had an arthroscopic left meniscus repair in 2/2017. Before the surgery, she states she had severe left knee pain. She denies any other recent ER visits or hospitalizations. Pt notes some hoarseness. She takes Zyrtec year-round and notes a history of seasonal allergies. Past Medical History:   Diagnosis Date    AR (allergic rhinitis)     Calculus of kidney     Dyspareunia     GERD (gastroesophageal reflux disease) 11/30/2010    HBP (high blood pressure) 8/9/2010    History of motion sickness 3/29/2014    Incomplete rectal prolapse- asymptomatic-2015- found on GYN exam 8/6/2015    Obesity (BMI 30-39.9) 6/30/2013    Situational stress-elderly Mom in [place] 3/29/2014     Past Surgical History:   Procedure Laterality Date    COLONOSCOPY N/A 10/4/2017    COLONOSCOPY performed by Angus Tomlinson MD at [address] [place] [address], DIAGNOSTIC  2007    repeat in 2017   [address] HX COLONOSCOPY  2007    repeat in 2017    HX KNEE ARTHROSCOPY Left 02/15/2017    meniscus     Current Outpatient Prescriptions on File Prior to Visit   Medication Sig Dispense Refill    atenolol (TENORMIN) 50 mg tablet Take 1 tablet by mouth  daily 90 Tab 0    aspirin delayed-release 81 mg tablet Take 81 mg by mouth daily.  cetirizine (ZYRTEC) 10 mg tablet Take 10 mg by mouth daily.  TRANSDERM-SCOP 1.5 mg (1 mg over 3 days) pt3d Apply 1 patch transdermally every 72 hours as directed 28 Patch 0    famotidine (PEPCID) 20 mg tablet Take 20 mg by mouth two (2) times a day.       multivitamin (ONE A DAY) tablet Take 1 Tab by mouth daily.  LACTOBACILLUS ACIDOPHILUS (PROBIOTIC PO) Take  by mouth daily.  naproxen sodium (ALEVE) 220 mg tablet Take 220-440 mg by mouth two (2) times daily (with meals). Prn      acetaminophen (TYLENOL EXTRA STRENGTH) 500 mg tablet Take 2 Tabs by mouth every six (6) hours as needed for Pain. No current facility-administered medications on file prior to visit. Allergies   Allergen Reactions    Doxycycline Rash    Sulfa (Sulfonamide Antibiotics) Rash    Tetracycline Rash     Family History   Problem Relation Age of Onset    Hypertension Mother     Elevated Lipids Mother     Cancer Other      melanoma in daughter     Social History     Social History    Marital status:      Spouse name: N/A    Number of children: N/A    Years of education: N/A     Social History Main Topics    Smoking status: Never Smoker    Smokeless tobacco: Never Used    Alcohol use Yes      Comment: occ    Drug use: No    Sexual activity: Yes     Other Topics Concern     Service No    Blood Transfusions No    Caffeine Concern No    Occupational Exposure No    Hobby Hazards No    Sleep Concern No    Stress Concern No    Weight Concern Yes    Special Diet No    Back Care No    Exercise Yes     walking    Bike Helmet No     does not ride   [place] Yes    Self-Exams Yes     Social History Narrative             Review of Systems   Constitutional: Negative for chills, diaphoresis, fever, malaise/fatigue and weight loss. Eyes: Negative for blurred vision, double vision, pain and redness. Respiratory: Negative for cough, shortness of breath and wheezing. Cardiovascular: Negative for chest pain, palpitations, orthopnea, claudication, leg swelling and PND. Skin: Negative for itching and rash. Neurological: Negative for dizziness, tingling, tremors, sensory change, speech change, focal weakness, seizures, loss of consciousness, weakness and headaches. Results for orders placed or performed in visit on 88/41/30   METABOLIC PANEL, COMPREHENSIVE   Result Value Ref Range    Glucose 123 (H) 65 - 99 mg/dL    BUN 13 8 - 27 mg/dL    Creatinine 0.79 0.57 - 1.00 mg/dL    GFR est non-AA 77 >59 mL/min/1.73    GFR est AA 89 >59 mL/min/1.73    BUN/Creatinine ratio 16 12 - 28    Sodium 141 134 - 144 mmol/L    Potassium 4.8 3.5 - 5.2 mmol/L    Chloride 101 96 - 106 mmol/L    CO2 25 18 - 29 mmol/L    Calcium 9.5 8.7 - 10.3 mg/dL    Protein, total 6.4 6.0 - 8.5 g/dL    Albumin 4.3 3.6 - 4.8 g/dL    GLOBULIN, TOTAL 2.1 1.5 - 4.5 g/dL    A-G Ratio 2.0 1.2 - 2.2    Bilirubin, total 0.6 0.0 - 1.2 mg/dL    Alk. phosphatase 125 (H) 39 - 117 IU/L    AST (SGOT) 20 0 - 40 IU/L    ALT (SGPT) 20 0 - 32 IU/L   LIPID PANEL   Result Value Ref Range    Cholesterol, total 207 (H) 100 - 199 mg/dL    Triglyceride 112 0 - 149 mg/dL    HDL Cholesterol 79 >39 mg/dL    VLDL, calculated 22 5 - 40 mg/dL    LDL, calculated 106 (H) 0 - 99 mg/dL   HEMOGLOBIN A1C WITH EAG   Result Value Ref Range    Hemoglobin A1c 5.8 (H) 4.8 - 5.6 %    Estimated average glucose 120 mg/dL   MICROALBUMIN, UR, RAND W/ MICROALBUMIN/CREA RATIO   Result Value Ref Range    Creatinine, urine 72.5 Not Estab. mg/dL    Microalbumin, urine <3.0 Not Estab. ug/mL    Microalb/Creat ratio (ug/mg creat.) <4.1 0.0 - 30.0 mg/g creat   TSH 3RD GENERATION   Result Value Ref Range    TSH 1.560 0.450 - 4.500 uIU/mL   T4, FREE   Result Value Ref Range    T4, Free 1.22 0.82 - 1.77 ng/dL   CVD REPORT   Result Value Ref Range    INTERPRETATION Note                Physical Exam  Visit Vitals    /77 (BP 1 Location: Right arm, BP Patient Position: Sitting)    Pulse (!) 56    Temp 97.9 °F (36.6 °C) (Oral)    Resp 18    Ht 5' 6\" (1.676 m)    Wt 226 lb 12.8 oz (102.9 kg)    SpO2 98%    BMI 36.61 kg/m2     Head: Normocephalic, without obvious abnormality, atraumatic  Eyes: conjunctivae/corneas clear. PERRL, EOM's intact.    Neck: supple, symmetrical, trachea midline, no adenopathy, thyroid: not enlarged, symmetric, no tenderness/mass/nodules, no carotid bruit and no JVD  Lungs: clear to auscultation bilaterally  Heart: regular rate and rhythm, S1, S2 normal, no murmur, click, rub or gallop  Extremities: extremities normal, atraumatic, no cyanosis or edema  Pulses: 2+ and symmetric  Lymph nodes: Cervical, supraclavicular, and axillary nodes normal.  Neurologic: Grossly normal          ASSESSMENT and PLAN    ICD-10-CM ICD-9-CM    1. Diabetes mellitus without complication ([facility] 75.)- 9587 E11.9 250.00 HEMOGLOBIN A1C WITH EAG      MICROALBUMIN, UR, RAND W/ MICROALBUMIN/CREA RATIO   2. Essential hypertension Z64 880.7 METABOLIC PANEL, COMPREHENSIVE   3. Pure hypercholesterolemia E78.00 272.0 LIPID PANEL      pravastatin (PRAVACHOL) 20 mg tablet      DISCONTINUED: pravastatin (PRAVACHOL) 20 mg tablet   4. Situational stress-elderly Mom in [place] F43.9 V62.89    5. Gastroesophageal reflux disease without esophagitis K21.9 530.81    6. Cold intolerance R68.89 780.99 TSH 3RD GENERATION      T4, FREE     Diagnoses and all orders for this visit:    1. Diabetes mellitus without complication ([facility] 75.)- 9822  -     HEMOGLOBIN A1C WITH EAG  -     MICROALBUMIN, UR, RAND W/ MICROALBUMIN/CREA RATIO    2. Essential hypertension  -     METABOLIC PANEL, COMPREHENSIVE    3. Pure hypercholesterolemia  -     LIPID PANEL  -     pravastatin (PRAVACHOL) 20 mg tablet; Take 1 tablet by mouth  nightly    4. Situational stress-elderly Mom in [place]    5. Gastroesophageal reflux disease without esophagitis    6.  Cold intolerance  -     TSH 3RD GENERATION  -     T4, FREE    Other orders  -     CVD REPORT      Follow-up Disposition:  Return in about 6 months (around 6/13/2018), or BP or glucose OOC, for F/U HTN,CHOL,DM.     lab results and schedule of future lab studies reviewed with patient  reviewed diet, exercise and weight control  cardiovascular risk and specific lipid/LDL goals reviewed  reviewed medications and side effects in detail  Please call my office if there are any questions- [phone]. I will arrange for follow up after the lab tests done from today return  Recommended a weekly \"heart check. \" I went into detail how to do this. Call for refills on medications as needed. Discussed expected course/resolution/complications of diagnosis in detail with patient. Medication risks/benefits/costs/interactions/alternatives discussed with patient. Pt was given an after visit summary which includes diagnoses, current medications & vitals. Pt expressed understanding with the diagnosis and plan. Total 25 minutes,60 % counseling re:   Reviewed symptoms, or lack thereof, of hypertension and elevated cholesterol. Reviewed BP, cholesterol and diabetic goals. LDL goal<100,HDL goal>45, Triglyceride goal<150, A1C< 7.0, BP<140/90. Darrel Brandt Reviewed in detail the proper technique of checking the blood pressure- check it on an average day only, not on a stressful day, sitting, no exercise for at least 1 hour and not experiencing any new pain( chronic pain is OK). Patient encouraged to check BP sitting and standing at least once a month and to report these readings to me if > 140/ 90 on average , or if the standing BP is >  15 points lower than the sitting. She did complain of some cold intolerance and has had some weight gain, so we checked her thyroid as well. I talked about the importance of weight control, and she'll continue to work on that. I talked about different types of diets, and that most weight loss is through dietary change and exercise is only a small part. She notes that she is exercising fairly regularly and is trying to get that in more and more, but apparently she will have to restrict her activity a lot when she has the retinal surgery. I did encourage her when she's able to to do a \"heart check\" weekly, and she'll do that on the exercise bike.  The biking will also help her keep her knees younger and healthier, with less arthritis. BMI is significantly elevated- in the obese range. I reviewed diet, exercise and weight control. Discussed weight control in detail, the importance of mainly decreased carbs, and for weight maintenance, exercise; discussed different diets and that it isn't as important to watch the type of foods as it is to decrease calorie intake no matter what type of diet you do, etc.     Apparently she has a macular hole in her right retina, and they're going to repair it in January. Basically, it's causing her vision in her right eye to have a lot of distortion; things will have ripples, dark spots, etc. No troubles in her left eye, though  Also, discussed symptoms of concern that were noted today in the note above, treatment options( including doing nothing), when to follow up before recommended time frame. Also, answered all questions. .   This document was written by Abundio Tejada, as dictated by Chel Amaya MD.  I have reviewed and agree with the above note and have made corrections where appropriate Jagdeep Brandt M.D.

## 2017-12-13 NOTE — PROGRESS NOTES
\"Reviewed record in preparation for visit and have obtained the necessary documentation\"    Chief Complaint   [facility]   [facility]      After 05/2018       1. Have you been to the ER, urgent care clinic since your last visit? Hospitalized since your last visit? No    2. Have you seen or consulted any other health care providers outside of the [address] [place] [address] since your last visit? Include any pap smears or colon screening.  No

## 2017-12-13 NOTE — MR AVS SNAPSHOT
Visit Information Date & Time Provider Department Dept. Phone Encounter #  
 12/13/2017  9:00 AM Opal Mcguire  [place] [phone] [id number] Upcoming Health Maintenance Date Due  
 EYE EXAM RETINAL OR DILATED Q1 6/13/2017 HEMOGLOBIN A1C Q6M 11/1/2017 MICROALBUMIN Q1 1/23/2018 FOOT EXAM Q1 2/12/2018 LIPID PANEL Q1 5/1/2018 MEDICARE YEARLY EXAM 5/2/2018 GLAUCOMA SCREENING Q2Y 6/13/2018 DTaP/Tdap/Td series (2 - Td) 10/25/2020 COLONOSCOPY 10/4/2027 Allergies as of 12/13/2017  Review Complete On: 12/13/2017 By: Franki Roger LPN Severity Noted Reaction Type Reactions Doxycycline  05/30/2012   Systemic Rash  
 Sulfa (Sulfonamide Antibiotics)  08/09/2010    Rash Tetracycline  08/09/2010    Rash Current Immunizations  Never Reviewed Name Date Influenza High Dose Vaccine PF 10/12/2017, 10/3/2016, 10/16/2015 Pneumococcal Conjugate (PCV-13) 10/21/2015 Pneumococcal Polysaccharide (PPSV-23) 1/23/2017 TDAP Vaccine 10/25/2010 Zoster Vaccine, Live 1/23/2013 Not reviewed this visit You Were Diagnosed With   
  
 Codes Comments Diabetes mellitus without complication ([facility]    -  Primary ICD-10-CM: E11.9 ICD-9-CM: 250.00 Essential hypertension     ICD-10-CM: I10 
ICD-9-CM: 401.9 Pure hypercholesterolemia     ICD-10-CM: E78.00 ICD-9-CM: 272.0 Situational stress     ICD-10-CM: F43.9 ICD-9-CM: V62.89 Gastroesophageal reflux disease without esophagitis     ICD-10-CM: K21.9 ICD-9-CM: 530.81 Cold intolerance     ICD-10-CM: R68.89 ICD-9-CM: 780.99 Vitals BP Pulse Temp Resp Height(growth percentile) Weight(growth percentile) 135/77 (BP 1 Location: Right arm, BP Patient Position: Sitting) (!) 56 97.9 °F (36.6 °C) (Oral) 18 5' 6\" (1.676 m) 226 lb 12.8 oz (102.9 kg) SpO2 BMI OB Status Smoking Status 98% 36.61 kg/m2 Postmenopausal Never Smoker Vitals History BMI and BSA Data Body Mass Index Body Surface Area  
 36.61 kg/m 2 2.19 m 2 Preferred Pharmacy Pharmacy Name Phone [address] [facility], [address] In[place] Your Updated Medication List  
  
   
This list is accurate as of: 12/13/17 10:32 AM.  Always use your most recent med list.  
  
  
  
  
 ALEVE 220 mg tablet Generic drug:  naproxen sodium Take 220-440 mg by mouth two (2) times daily (with meals). Prn  
  
 aspirin delayed-release 81 mg tablet Take 81 mg by mouth daily. atenolol 50 mg tablet Commonly known as:  TENORMIN Take 1 tablet by mouth  daily FLUZONE HIGH-DOSE 2017-18 (PF) Syrg injection Generic drug:  influenza vaccine 2017-18 (65 yrs+)(PF)  
TO BE ADMINISTERED BY PHARMACIST FOR IMMUNIZATION  
  
 multivitamin tablet Commonly known as:  ONE A DAY Take 1 Tab by mouth daily. ofloxacin 0.3 % ophthalmic solution Commonly known as:  FLOXIN  
  
 PEPCID 20 mg tablet Generic drug:  famotidine Take 20 mg by mouth two (2) times a day. pravastatin 20 mg tablet Commonly known as:  PRAVACHOL Take 1 tablet by mouth  nightly  
  
 prednisoLONE acetate 1 % ophthalmic suspension Commonly known as:  PRED FORTE  
  
 PROBIOTIC PO Take  by mouth daily. SUPREP BOWEL PREP KIT 17.5-3.13-1.6 gram Solr oral solution Generic drug:  sodium-potassium-mag sulfate Take 177 mL by mouth. TRANSDERM-SCOP 1 mg over 3 days Pt3d Generic drug:  scopolamine Apply 1 patch transdermally every 72 hours as directed TYLENOL EXTRA STRENGTH 500 mg tablet Generic drug:  acetaminophen Take 2 Tabs by mouth every six (6) hours as needed for Pain. ZyrTEC 10 mg tablet Generic drug:  cetirizine Take 10 mg by mouth daily. Prescriptions Sent to Pharmacy Refills  
 pravastatin (PRAVACHOL) 20 mg tablet 1 Sig: Take 1 tablet by mouth  nightly  Class: Normal  
 Pharmacy: [address], [address] Ph #: [phone] We Performed the Following HEMOGLOBIN A1C WITH EAG [48432 CPT(R)] LIPID PANEL [29603 CPT(R)] METABOLIC PANEL, COMPREHENSIVE [41405 CPT(R)] MICROALBUMIN, UR, RAND W/ MICROALBUMIN/CREA RATIO E2800045 CPT(R)] T4, FREE [id number] CPT(R)] TSH 3RD GENERATION [96711 CPT(R)] Patient Instructions Learning About Type 2 Diabetes What is type 2 diabetes? Insulin is a hormone that helps your body use sugar from your food as energy. Type 2 diabetes happens when your body can't use insulin the right way. Over time, the pancreas can't make enough insulin. If you don't have enough insulin, too much sugar stays in your blood. If you are overweight, get little or no exercise, or have type 2 diabetes in your family, you are more likely to have problems with the way insulin works in your body.  Americans, Hispanics, Native Americans,  Americans, and Pacific Islanders have a higher risk for type 2 diabetes. Type 2 diabetes can be prevented or delayed with a healthy lifestyle, which includes staying at a healthy weight, making smart food choices, and getting regular exercise. What can you expect with type 2 diabetes? Sheila Quesada keep hearing about how important it is to keep your blood sugar within a target range. That's because over time, high blood sugar can lead to serious problems. It can: 
· Harm your eyes, nerves, and kidneys. · Damage your blood vessels, leading to heart disease and stroke. · Reduce blood flow and cause nerve damage to parts of your body, especially your feet. This can cause slow healing and pain when you walk. · Make your immune system weak and less able to fight infections. When people hear the word \"diabetes,\" they often think of problems like these.  But daily care and treatment can help prevent or delay these problems. The goal is to keep your blood sugar in a target range. That's the best way to reduce your chance of having more problems from diabetes. What are the symptoms? Some people who have type 2 diabetes may not have any symptoms early on. Many people with the disease don't even know they have it at first. But with time, diabetes starts to cause symptoms. You experience most symptoms of type 2 diabetes when your blood sugar is either too high or too low. The most common symptoms of high blood sugar include: · Thirst. 
· Frequent urination. · Weight loss. · Blurry vision. The symptoms of low blood sugar include: · Sweating. · Shakiness. · Weakness. · Hunger. · Confusion. How can you prevent type 2 diabetes? The best way to prevent or delay type 2 diabetes is to adopt healthy habits, which include: 
· Staying at a healthy weight. · Exercising regularly. · Eating healthy foods. How is type 2 diabetes treated? If you have type 2 diabetes, here are the most important things you can do. · Take your diabetes medicines. · Check your blood sugar as often as your doctor recommends. Also, get a hemoglobin A1c test at least every 6 months. · Try to eat a variety of foods and to spread carbohydrate throughout the day. Carbohydrate raises blood sugar higher and more quickly than any other nutrient does. Carbohydrate is found in sugar, breads and cereals, fruit, starchy vegetables such as potatoes and corn, and milk and yogurt. · Get at least 30 minutes of exercise on most days of the week. Walking is a good choice. You also may want to do other activities, such as running, swimming, cycling, or playing tennis or team sports. If your doctor says it's okay, do muscle-strengthening exercises at least 2 times a week. · See your doctor for checkups and tests on a regular schedule. · If you have high blood pressure or high cholesterol, take the medicines as prescribed by your doctor. · Do not smoke. Smoking can make health problems worse. This includes problems you might have with type 2 diabetes. If you need help quitting, talk to your doctor about stop-smoking programs and medicines. These can increase your chances of quitting for good. Follow-up care is a key part of your treatment and safety. Be sure to make and go to all appointments, and call your doctor if you are having problems. It's also a good idea to know your test results and keep a list of the medicines you take. Where can you learn more? Go to http://yonatan-bianca.info/. Enter Y127 in the search box to learn more about \"Learning About Type 2 Diabetes. \" Current as of: March 13, 2017 Content Version: 11.4 © 9167-5306 Healthwise, [employer]. Care instructions adapted under license by [employer] (which disclaims liability or warranty for this information). If you have questions about a medical condition or this instruction, always ask your healthcare professional. [place] [address] any warranty or liability for your use of this information. Introducing [facility]! Dear Brooklynn Yan: Thank you for requesting a Codota account. Our records indicate that you already have an active Codota account. You can access your account anytime at [email or web address] Did you know that you can access your hospital and ER discharge instructions at any time in Codota? You can also review all of your test results from your hospital stay or ER visit. Additional Information If you have questions, please visit the Frequently Asked Questions section of the [employer]ota website at [email or web address] Remember, [employer]ota is NOT to be used for urgent needs. For medical emergencies, dial 911. Now available from your iPhone and Android! Please provide this summary of care documentation to your next provider. Your primary care clinician is listed as [place] [address], [facility]/s . If you have any questions after today's visit, please call [phone].

## 2017-12-14 LAB
ALBUMIN/CREAT UR: <4.1 MG/G CREAT (ref 0–30)
CREAT UR-MCNC: 72.5 MG/DL
MICROALBUMIN UR-MCNC: <3 UG/ML

## 2017-12-15 LAB
ALBUMIN SERPL-MCNC: 4.3 G/DL (ref 3.6–4.8)
ALBUMIN/GLOB SERPL: 2 {RATIO} (ref 1.2–2.2)
ALP SERPL-CCNC: 125 IU/L (ref 39–117)
ALT SERPL-CCNC: 20 IU/L (ref 0–32)
AST SERPL-CCNC: 20 IU/L (ref 0–40)
BILIRUB SERPL-MCNC: 0.6 MG/DL (ref 0–1.2)
BUN SERPL-MCNC: 13 MG/DL (ref 8–27)
BUN/CREAT SERPL: 16 (ref 12–28)
CALCIUM SERPL-MCNC: 9.5 MG/DL (ref 8.7–10.3)
CHLORIDE SERPL-SCNC: 101 MMOL/L (ref 96–106)
CHOLEST SERPL-MCNC: 207 MG/DL (ref 100–199)
CO2 SERPL-SCNC: 25 MMOL/L (ref 18–29)
CREAT SERPL-MCNC: 0.79 MG/DL (ref 0.57–1)
EST. AVERAGE GLUCOSE BLD GHB EST-MCNC: 120 MG/DL
GFR SERPLBLD CREATININE-BSD FMLA CKD-EPI: 77 ML/MIN/1.73
GFR SERPLBLD CREATININE-BSD FMLA CKD-EPI: 89 ML/MIN/1.73
GLOBULIN SER CALC-MCNC: 2.1 G/DL (ref 1.5–4.5)
GLUCOSE SERPL-MCNC: 123 MG/DL (ref 65–99)
HBA1C MFR BLD: 5.8 % (ref 4.8–5.6)
HDLC SERPL-MCNC: 79 MG/DL
INTERPRETATION, 910389: NORMAL
LDLC SERPL CALC-MCNC: 106 MG/DL (ref 0–99)
POTASSIUM SERPL-SCNC: 4.8 MMOL/L (ref 3.5–5.2)
PROT SERPL-MCNC: 6.4 G/DL (ref 6–8.5)
SODIUM SERPL-SCNC: 141 MMOL/L (ref 134–144)
T4 FREE SERPL-MCNC: 1.22 NG/DL (ref 0.82–1.77)
TRIGL SERPL-MCNC: 112 MG/DL (ref 0–149)
TSH SERPL DL<=0.005 MIU/L-ACNC: 1.56 UIU/ML (ref 0.45–4.5)
VLDLC SERPL CALC-MCNC: 22 MG/DL (ref 5–40)

## 2018-01-04 DIAGNOSIS — I10 ESSENTIAL HYPERTENSION: ICD-10-CM

## 2018-01-04 RX ORDER — ATENOLOL 50 MG/1
TABLET ORAL
Qty: 90 TAB | Refills: 1 | Status: SHIPPED | OUTPATIENT
Start: 2018-01-04 | End: 2018-05-25 | Stop reason: SDUPTHER

## 2018-01-04 NOTE — TELEPHONE ENCOUNTER
Pharmacy on file verified  Requested Prescriptions     Pending Prescriptions Disp Refills    atenolol (TENORMIN) 50 mg tablet 90 Tab 0     Sig: Take 1 tablet by mouth  daily     Pharmacy requesting a 90 day supply

## 2018-01-10 ENCOUNTER — OFFICE VISIT (OUTPATIENT)
Dept: FAMILY MEDICINE CLINIC | Age: 68
End: 2018-01-10

## 2018-01-10 ENCOUNTER — HOSPITAL ENCOUNTER (OUTPATIENT)
Dept: LAB | Age: 68
Discharge: HOME | End: 2018-01-10
Payer: MEDICARE

## 2018-01-10 VITALS
BODY MASS INDEX: 36.45 KG/M2 | HEART RATE: 69 BPM | OXYGEN SATURATION: 98 % | HEIGHT: 66 IN | RESPIRATION RATE: 18 BRPM | TEMPERATURE: 97.7 F | DIASTOLIC BLOOD PRESSURE: 73 MMHG | SYSTOLIC BLOOD PRESSURE: 129 MMHG | WEIGHT: 226.8 LBS

## 2018-01-10 DIAGNOSIS — Z87.898 HISTORY OF MOTION SICKNESS: ICD-10-CM

## 2018-01-10 DIAGNOSIS — I10 ESSENTIAL HYPERTENSION: Primary | ICD-10-CM

## 2018-01-10 DIAGNOSIS — H35.341 MACULAR HOLE OF RIGHT EYE: ICD-10-CM

## 2018-01-10 DIAGNOSIS — K21.9 GASTROESOPHAGEAL REFLUX DISEASE WITHOUT ESOPHAGITIS: ICD-10-CM

## 2018-01-10 DIAGNOSIS — F43.9 SITUATIONAL STRESS: Chronic | ICD-10-CM

## 2018-01-10 DIAGNOSIS — K62.3 INCOMPLETE RECTAL PROLAPSE: Chronic | ICD-10-CM

## 2018-01-10 DIAGNOSIS — E78.00 PURE HYPERCHOLESTEROLEMIA: ICD-10-CM

## 2018-01-10 DIAGNOSIS — E11.9 DIABETES MELLITUS WITHOUT COMPLICATION (HCC): ICD-10-CM

## 2018-01-10 DIAGNOSIS — E66.9 OBESITY (BMI 30-39.9): ICD-10-CM

## 2018-01-10 DIAGNOSIS — Z01.818 PRE-OP EXAM: ICD-10-CM

## 2018-01-10 DIAGNOSIS — Z87.442 HISTORY OF KIDNEY STONES: ICD-10-CM

## 2018-01-10 DIAGNOSIS — J30.1 CHRONIC SEASONAL ALLERGIC RHINITIS DUE TO POLLEN: ICD-10-CM

## 2018-01-10 PROCEDURE — 85025 COMPLETE CBC W/AUTO DIFF WBC: CPT

## 2018-01-10 PROCEDURE — 80053 COMPREHEN METABOLIC PANEL: CPT

## 2018-01-10 RX ORDER — PREDNISOLONE ACETATE 10 MG/ML
SUSPENSION/ DROPS OPHTHALMIC
Refills: 1 | COMMUNITY
Start: 2017-12-12 | End: 2018-07-11 | Stop reason: ALTCHOICE

## 2018-01-10 RX ORDER — OFLOXACIN 3 MG/ML
SOLUTION/ DROPS OPHTHALMIC
Refills: 0 | COMMUNITY
Start: 2017-12-12 | End: 2018-07-11 | Stop reason: ALTCHOICE

## 2018-01-10 NOTE — LETTER
2/5/2018 10:23 AM 
 
Ms. Le Moody [address] [facility] [address] [id number] Dear Le Moody: 
 
Please find your most recent results below. Resulted Orders METABOLIC PANEL, COMPREHENSIVE Result Value Ref Range Glucose 109 (H) 65 - 99 mg/dL BUN 16 8 - 27 mg/dL Creatinine 0.74 0.57 - 1.00 mg/dL GFR est non-AA 84 >59 mL/min/1.73 GFR est AA 96 >59 mL/min/1.73  
 BUN/Creatinine ratio 22 12 - 28 Sodium 141 134 - 144 mmol/L Potassium 4.9 3.5 - 5.2 mmol/L Chloride 103 96 - 106 mmol/L  
 CO2 23 18 - 29 mmol/L Calcium 9.5 8.7 - 10.3 mg/dL Protein, total 6.3 6.0 - 8.5 g/dL Albumin 4.1 3.6 - 4.8 g/dL GLOBULIN, TOTAL 2.2 1.5 - 4.5 g/dL A-G Ratio 1.9 1.2 - 2.2 Bilirubin, total 0.5 0.0 - 1.2 mg/dL Alk. phosphatase 112 39 - 117 IU/L  
 AST (SGOT) 26 0 - 40 IU/L  
 ALT (SGPT) 28 0 - 32 IU/L Narrative Performed at:  [address]  [id number] : Claudia Rendon MD, Phone:  [phone] CBC WITH AUTOMATED DIFF Result Value Ref Range WBC 7.6 3.4 - 10.8 x10E3/uL  
 RBC 4.26 3.77 - 5.28 x10E6/uL HGB 13.8 11.1 - 15.9 g/dL HCT 40.7 34.0 - 46.6 % MCV 96 79 - 97 fL  
 MCH 32.4 26.6 - 33.0 pg  
 MCHC 33.9 31.5 - 35.7 g/dL  
 RDW 13.1 12.3 - 15.4 % PLATELET 786 950 - 355 x10E3/uL NEUTROPHILS 61 Not Estab. % Lymphocytes 26 Not Estab. % MONOCYTES 7 Not Estab. % EOSINOPHILS 5 Not Estab. % BASOPHILS 1 Not Estab. %  
 ABS. NEUTROPHILS 4.7 1.4 - 7.0 x10E3/uL Abs Lymphocytes 2.0 0.7 - 3.1 x10E3/uL  
 ABS. MONOCYTES 0.5 0.1 - 0.9 x10E3/uL  
 ABS. EOSINOPHILS 0.4 0.0 - 0.4 x10E3/uL  
 ABS. BASOPHILS 0.1 0.0 - 0.2 x10E3/uL IMMATURE GRANULOCYTES 0 Not Estab. %  
 ABS. IMM. GRANS. 0.0 0.0 - 0.1 x10E3/uL Narrative Performed at:  [address]  [id number] : Claudia Rendon MD, Phone:  [phone] RECOMMENDATIONS: 
 GREAT NEWS!! All of your recent lab tests are normal or at goal. The glucose continues borderline. Keep working on your weight. With simply 5 lbs down each year, you won't develop diabetes, but otherwise you will. I would continue everything the same and schedule your next fasting office visit in 6 months. In addition, a wellness visit is recommended every year after the age of 72. Please call me if you have any questions: [phone] Sincerely, 
 
 
Marcela Desir MD

## 2018-01-10 NOTE — PROGRESS NOTES
\"Reviewed record in preparation for visit and have obtained the necessary documentation\"  Chief Complaint   Patient presents with    Pre-op Exam     1. Have you been to the ER, urgent care clinic since your last visit? Hospitalized since your last visit? No    2. Have you seen or consulted any other health care providers outside of the [address] [place] [address] since your last visit? Include any pap smears or colon screening.  No

## 2018-01-10 NOTE — PROGRESS NOTES
HISTORY OF PRESENT ILLNESS  ANIKET Kunz is a 75 y.o. female with history of HTN, GERD, DM-II, and hyperlipidemia who presents to office today for pre-op exam. Pt is scheduled for surgery on 1/31 with Dr. Christo Burgess to repair a macular hole in her right eye. She notes that, when she covers up her left eye, she sees a large dark spot and her vision becomes wavy x3-4 months. Pt notes she has slight bilateral cataracts, which will likely require surgery soon. She also notes a freckle in her left eye, which is being monitored. She denies eye pain, and she denies glaucoma and macular degeneration. Past Medical History:   Diagnosis Date    AR (allergic rhinitis)     Calculus of kidney     Dyspareunia     GERD (gastroesophageal reflux disease) 11/30/2010    HBP (high blood pressure) 8/9/2010    History of motion sickness 3/29/2014    Incomplete rectal prolapse- asymptomatic-2015- found on GYN exam 8/6/2015    Obesity (BMI 30-39.9) 6/30/2013    Situational stress-elderly Mom in [place] 3/29/2014     Past Surgical History:   Procedure Laterality Date    COLONOSCOPY N/A 10/4/2017    COLONOSCOPY performed by Cathie Swain MD at hospitals ENDOSCOPY    ENDOSCOPY, COLON, DIAGNOSTIC  2007    repeat in 2017   [address] HX COLONOSCOPY  2007    repeat in 2017    HX KNEE ARTHROSCOPY Left 02/15/2017    meniscus     Current Outpatient Prescriptions on File Prior to Visit   Medication Sig Dispense Refill    atenolol (TENORMIN) 50 mg tablet Take 1 tablet by mouth  daily 90 Tab 1    pravastatin (PRAVACHOL) 20 mg tablet Take 1 tablet by mouth  nightly 90 Tab 1    aspirin delayed-release 81 mg tablet Take 81 mg by mouth daily.  cetirizine (ZYRTEC) 10 mg tablet Take 10 mg by mouth daily.  TRANSDERM-SCOP 1.5 mg (1 mg over 3 days) pt3d Apply 1 patch transdermally every 72 hours as directed 28 Patch 0    famotidine (PEPCID) 20 mg tablet Take 20 mg by mouth two (2) times a day.       multivitamin (ONE A DAY) tablet Take 1 Tab by mouth daily.  LACTOBACILLUS ACIDOPHILUS (PROBIOTIC PO) Take  by mouth daily.  naproxen sodium (ALEVE) 220 mg tablet Take 220-440 mg by mouth two (2) times daily (with meals). Prn      acetaminophen (TYLENOL EXTRA STRENGTH) 500 mg tablet Take 2 Tabs by mouth every six (6) hours as needed for Pain. No current facility-administered medications on file prior to visit. Allergies   Allergen Reactions    Doxycycline Rash    Sulfa (Sulfonamide Antibiotics) Rash    Tetracycline Rash     Family History   Problem Relation Age of Onset    Hypertension Mother    [de-identified] Elevated Lipids Mother     Arrhythmia Mother      a. fib    Cancer Other      melanoma in daughter     Social History     Social History    Marital status:      Spouse name: N/A    Number of children: N/A    Years of education: N/A     Social History Main Topics    Smoking status: Never Smoker    Smokeless tobacco: Never Used    Alcohol use Yes      Comment: occ    Drug use: No    Sexual activity: Yes     Other Topics Concern     Service No    Blood Transfusions No    Caffeine Concern No    Occupational Exposure No    Hobby Hazards No    Sleep Concern No    Stress Concern No    Weight Concern Yes    Special Diet No    Back Care No    Exercise Yes     walking    Bike Helmet No     does not ride   [place] Yes    Self-Exams Yes     Social History Narrative             Review of Systems   Constitutional: Negative for chills, diaphoresis, fever, malaise/fatigue and weight loss. Eyes: Negative for blurred vision, double vision, pain and redness. Respiratory: Negative for cough, shortness of breath and wheezing. Cardiovascular: Negative for chest pain, palpitations, orthopnea, claudication, leg swelling and PND. Skin: Negative for itching and rash.    Neurological: Negative for dizziness, tingling, tremors, sensory change, speech change, focal weakness, seizures, loss of consciousness, weakness and headaches. Results for orders placed or performed in visit on 30/94/53   METABOLIC PANEL, COMPREHENSIVE   Result Value Ref Range    Glucose 123 (H) 65 - 99 mg/dL    BUN 13 8 - 27 mg/dL    Creatinine 0.79 0.57 - 1.00 mg/dL    GFR est non-AA 77 >59 mL/min/1.73    GFR est AA 89 >59 mL/min/1.73    BUN/Creatinine ratio 16 12 - 28    Sodium 141 134 - 144 mmol/L    Potassium 4.8 3.5 - 5.2 mmol/L    Chloride 101 96 - 106 mmol/L    CO2 25 18 - 29 mmol/L    Calcium 9.5 8.7 - 10.3 mg/dL    Protein, total 6.4 6.0 - 8.5 g/dL    Albumin 4.3 3.6 - 4.8 g/dL    GLOBULIN, TOTAL 2.1 1.5 - 4.5 g/dL    A-G Ratio 2.0 1.2 - 2.2    Bilirubin, total 0.6 0.0 - 1.2 mg/dL    Alk.  phosphatase 125 (H) 39 - 117 IU/L    AST (SGOT) 20 0 - 40 IU/L    ALT (SGPT) 20 0 - 32 IU/L   LIPID PANEL   Result Value Ref Range    Cholesterol, total 207 (H) 100 - 199 mg/dL    Triglyceride 112 0 - 149 mg/dL    HDL Cholesterol 79 >39 mg/dL    VLDL, calculated 22 5 - 40 mg/dL    LDL, calculated 106 (H) 0 - 99 mg/dL   HEMOGLOBIN A1C WITH EAG   Result Value Ref Range    Hemoglobin A1c 5.8 (H) 4.8 - 5.6 %    Estimated average glucose 120 mg/dL   MICROALBUMIN, UR, RAND W/ MICROALBUMIN/CREA RATIO   Result Value Ref Range    Creatinine, urine 72.5 Not Estab. mg/dL    Microalbumin, urine <3.0 Not Estab. ug/mL    Microalb/Creat ratio (ug/mg creat.) <4.1 0.0 - 30.0 mg/g creat   TSH 3RD GENERATION   Result Value Ref Range    TSH 1.560 0.450 - 4.500 uIU/mL   T4, FREE   Result Value Ref Range    T4, Free 1.22 0.82 - 1.77 ng/dL   CVD REPORT   Result Value Ref Range    INTERPRETATION Note              Physical Exam  Visit Vitals    /73 (BP 1 Location: Right arm, BP Patient Position: Sitting)    Pulse 69    Temp 97.7 °F (36.5 °C) (Oral)    Resp 18    Ht 5' 6\" (1.676 m)    Wt 226 lb 12.8 oz (102.9 kg)    SpO2 98%    BMI 36.61 kg/m2     Head: Normocephalic, without obvious abnormality, atraumatic  Eyes: conjunctivae/corneas clear. PERRL, EOM's intact. Neck: supple, symmetrical, trachea midline, no adenopathy, thyroid: not enlarged, symmetric, no tenderness/mass/nodules, no carotid bruit and no JVD  Lungs: clear to auscultation bilaterally  Heart: regular rate and rhythm, S1, S2 normal, no murmur, click, rub or gallop  Extremities: extremities normal, atraumatic, no cyanosis or edema  Pulses: 2+ and symmetric  Lymph nodes: Cervical, supraclavicular, and axillary nodes normal.  Neurologic: Grossly normal          ASSESSMENT and PLAN    ICD-10-CM ICD-9-CM    1. Essential hypertension I10 401.9    2. Pre-op exam P54.848 R27.77 METABOLIC PANEL, COMPREHENSIVE      CBC WITH AUTOMATED DIFF      AMB POC EKG ROUTINE W/ 12 LEADS, INTER & REP   3. Diabetes mellitus without complication ([facility].)- 0703 E11.9 250.00    4. Macular hole of right eye H35.341 362.54    5. Pure hypercholesterolemia E78.00 272.0    6. Gastroesophageal reflux disease without esophagitis K21.9 530.81    7. Chronic seasonal allergic rhinitis due to pollen J30.1 477.0    8. Incomplete rectal prolapse- asymptomatic-2015- found on GYN exam K62.3 569.1    9. Situational stress-elderly Mom in [place] F43.9 V62.89    10. Obesity (BMI 30-39. 9) E66.9 278.00    11. History of motion sickness Z87.898 V13.89    12. History of kidney stones Z87.442 V13.01      Diagnoses and all orders for this visit:    1. Essential hypertension    2. Pre-op exam  -     METABOLIC PANEL, COMPREHENSIVE  -     CBC WITH AUTOMATED DIFF  -     AMB POC EKG ROUTINE W/ 12 LEADS, INTER & REP    3. Diabetes mellitus without complication ([facility].)- 1435  Assessment & Plan:  Well Controlled, based on history, physical exam and review of pertinent labs, studies and medications; meds reconciled; continue current treatment plan, lifestyle modifications recommended, medication compliance emphasized. Key Antihyperglycemic Medications     Patient is on no antihyperglycemic meds.         Other Key Diabetic Medications             pravastatin (PRAVACHOL) 20 mg tablet  (Taking) Take 1 tablet by mouth  nightly        Lab Results   Component Value Date/Time    Hemoglobin A1c 5.8 12/13/2017 10:47 AM    Glucose 123 12/13/2017 10:47 AM    Creatinine 0.79 12/13/2017 10:47 AM    Microalb/Creat ratio (ug/mg creat.) <4.1 12/13/2017 10:47 AM    Cholesterol, total 207 12/13/2017 10:47 AM    HDL Cholesterol 79 12/13/2017 10:47 AM    LDL, calculated 106 12/13/2017 10:47 AM    Triglyceride 112 12/13/2017 10:47 AM     Diabetic Foot and Eye Exam HM Status   Topic Date Due    Eye Exam  06/13/2017    Diabetic Foot Care  02/12/2018         4. Macular hole of right eye    5. Pure hypercholesterolemia    6. Gastroesophageal reflux disease without esophagitis    7. Chronic seasonal allergic rhinitis due to pollen    8. Incomplete rectal prolapse- asymptomatic-2015- found on GYN exam    9. Situational stress-elderly Mom in [place]    10. Obesity (BMI 30-39.9)    11. History of motion sickness    12. History of kidney stones    Follow-up Disposition:  Return in about 5 months (around 6/10/2018), or if B or glucose OOC, for F/U HTN,CHOL,DM. reviewed medications and side effects in detail  Please call my office if there are any questions- [phone]. Discussed expected course/resolution/complications of diagnosis in detail with patient. Medication risks/benefits/costs/interactions/alternatives discussed with patient. Pt was given an after visit summary which includes diagnoses, current medications & vitals. Pt expressed understanding with the diagnosis and plan. Patient to call if no better in 3 -4 days and prn new problems. She's doing lab work, a CBC and BMP, because it was requested by the eye surgeon. EKG is entirely normal. I encouraged her to avoid high impact activities and any type of valsalva maneuver between now and her surgery to keep pressure off the eye itself and avoid any retinal detachment.  Acceptable activity would be swimming, elliptical, or cycling. I also encouraged her to check with the eye surgeon about stopping aspirin; they usually want you to do that at least a week ahead, same thing for the Aleve she usually takes. She does not take fish oil. It is okay to take tylenol between now and her surgery. Patient is cleared for her right eye surgery for a macular hole on 1/31/2018, pending lab results. EKG is acceptable. This document was written by Kimber Canavan, as dictated by Inés Doss MD.  I have reviewed and agree with the above note and have made corrections where appropriate Jagdeep Moralez M.D.

## 2018-01-11 LAB
ALBUMIN SERPL-MCNC: 4.1 G/DL (ref 3.6–4.8)
ALBUMIN/GLOB SERPL: 1.9 {RATIO} (ref 1.2–2.2)
ALP SERPL-CCNC: 112 IU/L (ref 39–117)
ALT SERPL-CCNC: 28 IU/L (ref 0–32)
AST SERPL-CCNC: 26 IU/L (ref 0–40)
BASOPHILS # BLD AUTO: 0.1 X10E3/UL (ref 0–0.2)
BASOPHILS NFR BLD AUTO: 1 %
BILIRUB SERPL-MCNC: 0.5 MG/DL (ref 0–1.2)
BUN SERPL-MCNC: 16 MG/DL (ref 8–27)
BUN/CREAT SERPL: 22 (ref 12–28)
CALCIUM SERPL-MCNC: 9.5 MG/DL (ref 8.7–10.3)
CHLORIDE SERPL-SCNC: 103 MMOL/L (ref 96–106)
CO2 SERPL-SCNC: 23 MMOL/L (ref 18–29)
CREAT SERPL-MCNC: 0.74 MG/DL (ref 0.57–1)
EOSINOPHIL # BLD AUTO: 0.4 X10E3/UL (ref 0–0.4)
EOSINOPHIL NFR BLD AUTO: 5 %
ERYTHROCYTE [DISTWIDTH] IN BLOOD BY AUTOMATED COUNT: 13.1 % (ref 12.3–15.4)
GLOBULIN SER CALC-MCNC: 2.2 G/DL (ref 1.5–4.5)
GLUCOSE SERPL-MCNC: 109 MG/DL (ref 65–99)
HCT VFR BLD AUTO: 40.7 % (ref 34–46.6)
HGB BLD-MCNC: 13.8 G/DL (ref 11.1–15.9)
IMM GRANULOCYTES # BLD: 0 X10E3/UL (ref 0–0.1)
IMM GRANULOCYTES NFR BLD: 0 %
LYMPHOCYTES # BLD AUTO: 2 X10E3/UL (ref 0.7–3.1)
LYMPHOCYTES NFR BLD AUTO: 26 %
MCH RBC QN AUTO: 32.4 PG (ref 26.6–33)
MCHC RBC AUTO-ENTMCNC: 33.9 G/DL (ref 31.5–35.7)
MCV RBC AUTO: 96 FL (ref 79–97)
MONOCYTES # BLD AUTO: 0.5 X10E3/UL (ref 0.1–0.9)
MONOCYTES NFR BLD AUTO: 7 %
NEUTROPHILS # BLD AUTO: 4.7 X10E3/UL (ref 1.4–7)
NEUTROPHILS NFR BLD AUTO: 61 %
PLATELET # BLD AUTO: 204 X10E3/UL (ref 150–379)
POTASSIUM SERPL-SCNC: 4.9 MMOL/L (ref 3.5–5.2)
PROT SERPL-MCNC: 6.3 G/DL (ref 6–8.5)
RBC # BLD AUTO: 4.26 X10E6/UL (ref 3.77–5.28)
SODIUM SERPL-SCNC: 141 MMOL/L (ref 134–144)
WBC # BLD AUTO: 7.6 X10E3/UL (ref 3.4–10.8)

## 2018-01-11 NOTE — ASSESSMENT & PLAN NOTE
Well Controlled, based on history, physical exam and review of pertinent labs, studies and medications; meds reconciled; continue current treatment plan, lifestyle modifications recommended, medication compliance emphasized. Key Antihyperglycemic Medications     Patient is on no antihyperglycemic meds.         Other Key Diabetic Medications             pravastatin (PRAVACHOL) 20 mg tablet  (Taking) Take 1 tablet by mouth  nightly        Lab Results   Component Value Date/Time    Hemoglobin A1c 5.8 12/13/2017 10:47 AM    Glucose 123 12/13/2017 10:47 AM    Creatinine 0.79 12/13/2017 10:47 AM    Microalb/Creat ratio (ug/mg creat.) <4.1 12/13/2017 10:47 AM    Cholesterol, total 207 12/13/2017 10:47 AM    HDL Cholesterol 79 12/13/2017 10:47 AM    LDL, calculated 106 12/13/2017 10:47 AM    Triglyceride 112 12/13/2017 10:47 AM     Diabetic Foot and Eye Exam HM Status   Topic Date Due    Eye Exam  06/13/2017    Diabetic Foot Care  02/12/2018

## 2018-02-04 NOTE — PROGRESS NOTES
GREAT NEWS!! All of your recent lab tests are normal or at goal. The glucose continues borderline. Keep working on your weight. With simply 5 lbs down each year, you won't develop diabetes, but otherwise you will. I would continue everything the same and schedule your next fasting office visit in 6 months. In addition, a physical is recommended every year after the age of 61.

## 2018-04-06 DIAGNOSIS — E78.00 PURE HYPERCHOLESTEROLEMIA: ICD-10-CM

## 2018-04-06 RX ORDER — PRAVASTATIN SODIUM 20 MG/1
TABLET ORAL
Qty: 90 TAB | Refills: 0 | Status: SHIPPED | OUTPATIENT
Start: 2018-04-06 | End: 2018-07-11 | Stop reason: SDUPTHER

## 2018-05-25 DIAGNOSIS — I10 ESSENTIAL HYPERTENSION: ICD-10-CM

## 2018-05-25 RX ORDER — ATENOLOL 50 MG/1
TABLET ORAL
Qty: 90 TAB | Refills: 0 | Status: SHIPPED | OUTPATIENT
Start: 2018-05-25 | End: 2018-07-11 | Stop reason: SDUPTHER

## 2018-07-11 ENCOUNTER — OFFICE VISIT (OUTPATIENT)
Dept: FAMILY MEDICINE CLINIC | Age: 68
End: 2018-07-11

## 2018-07-11 ENCOUNTER — HOSPITAL ENCOUNTER (OUTPATIENT)
Dept: LAB | Age: 68
Discharge: HOME | End: 2018-07-11
Payer: MEDICARE

## 2018-07-11 VITALS
WEIGHT: 209 LBS | TEMPERATURE: 98.1 F | BODY MASS INDEX: 33.59 KG/M2 | OXYGEN SATURATION: 99 % | DIASTOLIC BLOOD PRESSURE: 74 MMHG | HEIGHT: 66 IN | RESPIRATION RATE: 18 BRPM | SYSTOLIC BLOOD PRESSURE: 138 MMHG | HEART RATE: 68 BPM

## 2018-07-11 DIAGNOSIS — Z00.00 ROUTINE GENERAL MEDICAL EXAMINATION AT A HEALTH CARE FACILITY: ICD-10-CM

## 2018-07-11 DIAGNOSIS — J30.1 SEASONAL ALLERGIC RHINITIS DUE TO POLLEN: ICD-10-CM

## 2018-07-11 DIAGNOSIS — Z71.89 ACP (ADVANCE CARE PLANNING): ICD-10-CM

## 2018-07-11 DIAGNOSIS — E78.5 HYPERLIPIDEMIA LDL GOAL <100: ICD-10-CM

## 2018-07-11 DIAGNOSIS — E66.9 OBESITY (BMI 30-39.9): ICD-10-CM

## 2018-07-11 DIAGNOSIS — I10 ESSENTIAL HYPERTENSION: Primary | ICD-10-CM

## 2018-07-11 DIAGNOSIS — E78.00 PURE HYPERCHOLESTEROLEMIA: ICD-10-CM

## 2018-07-11 DIAGNOSIS — I10 ESSENTIAL HYPERTENSION: ICD-10-CM

## 2018-07-11 DIAGNOSIS — K21.9 GASTROESOPHAGEAL REFLUX DISEASE WITHOUT ESOPHAGITIS: ICD-10-CM

## 2018-07-11 DIAGNOSIS — E11.9 DIABETES MELLITUS WITHOUT COMPLICATION (HCC): ICD-10-CM

## 2018-07-11 DIAGNOSIS — Z00.00 MEDICARE ANNUAL WELLNESS VISIT, SUBSEQUENT: ICD-10-CM

## 2018-07-11 PROCEDURE — 80053 COMPREHEN METABOLIC PANEL: CPT

## 2018-07-11 PROCEDURE — 80061 LIPID PANEL: CPT

## 2018-07-11 PROCEDURE — 85027 COMPLETE CBC AUTOMATED: CPT

## 2018-07-11 PROCEDURE — 83036 HEMOGLOBIN GLYCOSYLATED A1C: CPT

## 2018-07-11 NOTE — TELEPHONE ENCOUNTER
Refill. Pharm on file. 90 day.     Requested Prescriptions     Pending Prescriptions Disp Refills    atenolol (TENORMIN) 50 mg tablet 90 Tab 0    pravastatin (PRAVACHOL) 20 mg tablet 90 Tab 0

## 2018-07-11 NOTE — MR AVS SNAPSHOT
Freya Trevizo 
 
 
 [address] 
[phone] Patient: Arturo De La Cruz MRN: [id number] CVC:4/32/4845 Visit Information Date & Time Provider Department Dept. Phone Encounter #  
 7/11/2018 10:45 AM Tank Gomez MD [address] [place] [address] [phone] [id number] Your Appointments 1/14/2019  8:45 AM  
ROUTINE CARE with Tank Gomez MD  
[facility]) Appt Note: fasting cholesterol [address] [address]  
[phone]  
  
   
 [address] [phone] Upcoming Health Maintenance Date Due  
 FOOT EXAM Q1 2/12/2018 MEDICARE YEARLY EXAM 5/2/2018 HEMOGLOBIN A1C Q6M 6/13/2018 BREAST CANCER SCRN MAMMOGRAM 11/7/2018 Influenza Age 5 to Adult 8/1/2018 EYE EXAM RETINAL OR DILATED Q1 12/12/2018 MICROALBUMIN Q1 12/13/2018 LIPID PANEL Q1 12/13/2018 GLAUCOMA SCREENING Q2Y 12/12/2019 DTaP/Tdap/Td series (2 - Td) 10/25/2020 COLONOSCOPY 10/4/2027 Allergies as of 7/11/2018  Review Complete On: 7/11/2018 By: Carleen Redman LPN Severity Noted Reaction Type Reactions Doxycycline  05/30/2012   Systemic Rash  
 Sulfa (Sulfonamide Antibiotics)  08/09/2010    Rash Tetracycline  08/09/2010    Rash Current Immunizations  Never Reviewed Name Date Influenza High Dose Vaccine PF 10/12/2017 12:00 AM, 10/3/2016, 10/16/2015 Pneumococcal Conjugate (PCV-13) 10/21/2015 Pneumococcal Polysaccharide (PPSV-23) 1/23/2017 TDAP Vaccine 10/25/2010 Zoster Recombinant 5/31/2018 12:00 AM  
 Zoster Vaccine, Live 1/23/2013 Not reviewed this visit You Were Diagnosed With   
  
 Codes Comments Essential hypertension    -  Primary ICD-10-CM: I10 
ICD-9-CM: 401.9 Diabetes mellitus without complication ([facility]     EXR-89-DD: E11.9 ICD-9-CM: 250.00 Hyperlipidemia LDL goal <100     ICD-10-CM: E78.5 ICD-9-CM: 272.4 Gastroesophageal reflux disease without esophagitis     ICD-10-CM: K21.9 ICD-9-CM: 530.81 Seasonal allergic rhinitis due to pollen     ICD-10-CM: J30.1 ICD-9-CM: 477.0 Routine general medical examination at a health care facility     ICD-10-CM: Z00.00 ICD-9-CM: V70.0 Vitals BP Pulse Temp Resp Height(growth percentile) Weight(growth percentile) 138/74 (BP 1 Location: Left arm, BP Patient Position: Sitting) 68 98.1 °F (36.7 °C) (Oral) 18 5' 6\" (1.676 m) 209 lb (94.8 kg) SpO2 BMI OB Status Smoking Status 99% 33.73 kg/m2 Postmenopausal Never Smoker BMI and BSA Data Body Mass Index Body Surface Area  
 33.73 kg/m 2 2.1 m 2 Preferred Pharmacy Pharmacy Name Phone [address] [facility], [address] [place] [address] [address] Your Updated Medication List  
  
   
This list is accurate as of 7/11/18 11:39 AM.  Always use your most recent med list.  
  
  
  
  
 ALEVE 220 mg tablet Generic drug:  naproxen sodium Take 220-440 mg by mouth two (2) times daily (with meals). Prn  
  
 aspirin delayed-release 81 mg tablet Take 81 mg by mouth daily. atenolol 50 mg tablet Commonly known as:  TENORMIN  
TAKE 1 TABLET BY MOUTH  DAILY  
  
 multivitamin tablet Commonly known as:  ONE A DAY Take 1 Tab by mouth daily. PEPCID 20 mg tablet Generic drug:  famotidine Take 20 mg by mouth two (2) times a day. pravastatin 20 mg tablet Commonly known as:  PRAVACHOL  
TAKE 1 TABLET BY MOUTH  NIGHTLY PROBIOTIC PO Take  by mouth daily. TRANSDERM-SCOP 1 mg over 3 days Pt3d Generic drug:  scopolamine Apply 1 patch transdermally every 72 hours as directed TYLENOL EXTRA STRENGTH 500 mg tablet Generic drug:  acetaminophen Take 2 Tabs by mouth every six (6) hours as needed for Pain. ZyrTEC 10 mg tablet Generic drug:  cetirizine Take 10 mg by mouth daily. We Performed the Following CBC W/O DIFF [55202 CPT(R)] HEMOGLOBIN A1C WITH EAG [79365 CPT(R)] LIPID PANEL [44060 CPT(R)] METABOLIC PANEL, COMPREHENSIVE [20224 CPT(R)] Introducing [facility]! Dear Verdis Gottron: Thank you for requesting a DrDoctor account. Our records indicate that you already have an active DrDoctor account. You can access your account anytime at [email or web address] Did you know that you can access your hospital and ER discharge instructions at any time in DrDoctor? You can also review all of your test results from your hospital stay or ER visit. Additional Information If you have questions, please visit the Frequently Asked Questions section of the DrDoctor website at [email or web address] Remember, DrDoctor is NOT to be used for urgent needs. For medical emergencies, dial 911. Now available from your iPhone and Android! Please provide this summary of care documentation to your next provider. Your primary care clinician is listed as [address] [address], [facility]/s . If you have any questions after today's visit, please call [phone].

## 2018-07-11 NOTE — PROGRESS NOTES
Chief Complaint   Patient presents with    Annual Wellness Visit    Hypertension     follow up    Diabetes     \"REVIEWED RECORD IN PREPARATION FOR VISIT AND HAVE OBTAINED THE NECESSARY DOCUMENTATION\"  1. Have you been to the ER, urgent care clinic since your last visit? Hospitalized since your last visit? No    2. Have you seen or consulted any other health care providers outside of the [address] [place] [address] since your last visit? Include any pap smears or colon screening. No  Patient does have a living will and advanced directives.

## 2018-07-11 NOTE — PROGRESS NOTES
HISTORY OF PRESENT ILLNESS  HPI  Curyl Mg is a 75 y.o. Female with a history of HTN, GERD and DM, who presents at the office today for a follow up. Pt had surgery for a torn cornea with symptoms of a black spot in her vision. She notes that her vision has improved somewhat. Past Medical History:   Diagnosis Date    AR (allergic rhinitis)     Calculus of kidney     Dyspareunia     GERD (gastroesophageal reflux disease) 11/30/2010    HBP (high blood pressure) 8/9/2010    History of motion sickness 3/29/2014    Incomplete rectal prolapse- asymptomatic-2015- found on GYN exam 8/6/2015    Obesity (BMI 30-39.9) 6/30/2013    Situational stress-elderly Mom in [facility] 3/29/2014     Past Surgical History:   Procedure Laterality Date    COLONOSCOPY N/A 10/4/2017    COLONOSCOPY performed by Dianna Landon MD at [address], DIAGNOSTIC  2007    repeat in 2017    HX [address] [facility] HX COLONOSCOPY  2007    repeat in 2017    HX KNEE ARTHROSCOPY Left 02/15/2017    meniscus     Current Outpatient Prescriptions on File Prior to Visit   Medication Sig Dispense Refill    aspirin delayed-release 81 mg tablet Take 81 mg by mouth daily.  cetirizine (ZYRTEC) 10 mg tablet Take 10 mg by mouth daily.  TRANSDERM-SCOP 1.5 mg (1 mg over 3 days) pt3d Apply 1 patch transdermally every 72 hours as directed 28 Patch 0    famotidine (PEPCID) 20 mg tablet Take 20 mg by mouth two (2) times a day.  multivitamin (ONE A DAY) tablet Take 1 Tab by mouth daily.  LACTOBACILLUS ACIDOPHILUS (PROBIOTIC PO) Take  by mouth daily.  naproxen sodium (ALEVE) 220 mg tablet Take 220-440 mg by mouth two (2) times daily (with meals). Prn      acetaminophen (TYLENOL EXTRA STRENGTH) 500 mg tablet Take 2 Tabs by mouth every six (6) hours as needed for Pain. No current facility-administered medications on file prior to visit.       Allergies   Allergen Reactions    Doxycycline Rash    Sulfa (Sulfonamide Antibiotics) Rash    Tetracycline Rash     Family History   Problem Relation Age of Onset    Hypertension Mother    Blossom Aguirre Elevated Lipids Mother     Arrhythmia Mother      a. fib    Alcohol abuse Father     Arrhythmia Paternal Grandmother 80     had a pacemaker    Heart Attack Paternal Grandfather 79    Cancer Other      melanoma in daughter     Social History     Social History    Marital status:      Spouse name: N/A    Number of children: N/A    Years of education: N/A     Social History Main Topics    Smoking status: Never Smoker    Smokeless tobacco: Never Used    Alcohol use Yes      Comment: occ    Drug use: No    Sexual activity: Yes     Other Topics Concern     Service No    Blood Transfusions No    Caffeine Concern No    Occupational Exposure No    Hobby Hazards No    Sleep Concern No    Stress Concern No    Weight Concern Yes    Special Diet No    Back Care No    Exercise Yes     walking    Bike Helmet No     does not ride   [place] Yes    Self-Exams Yes     Social History Narrative             Review of Systems   Constitutional: Negative for chills, diaphoresis, fever, malaise/fatigue and weight loss. Eyes: Negative for blurred vision, double vision and pain. Black spot in vision due to torn cornea   Respiratory: Negative for cough, shortness of breath and wheezing. Cardiovascular: Negative for chest pain, palpitations, orthopnea, claudication, leg swelling and PND. Genitourinary: Negative for frequency. Skin: Negative for itching and rash. Neurological: Negative for dizziness, tingling, tremors, sensory change, speech change, focal weakness, seizures, loss of consciousness, weakness and headaches. Endo/Heme/Allergies: Negative for environmental allergies and polydipsia. Does not bruise/bleed easily.      Results for orders placed or performed in visit on 07/11/18   LIPID PANEL   Result Value Ref Range Cholesterol, total 173 100 - 199 mg/dL    Triglyceride 98 0 - 149 mg/dL    HDL Cholesterol 64 >39 mg/dL    VLDL, calculated 20 5 - 40 mg/dL    LDL, calculated 89 0 - 99 mg/dL   METABOLIC PANEL, COMPREHENSIVE   Result Value Ref Range    Glucose 113 (H) 65 - 99 mg/dL    BUN 13 8 - 27 mg/dL    Creatinine 0.83 0.57 - 1.00 mg/dL    GFR est non-AA 73 >59 mL/min/1.73    GFR est AA 84 >59 mL/min/1.73    BUN/Creatinine ratio 16 12 - 28    Sodium 141 134 - 144 mmol/L    Potassium 4.6 3.5 - 5.2 mmol/L    Chloride 104 96 - 106 mmol/L    CO2 26 20 - 29 mmol/L    Calcium 9.4 8.7 - 10.3 mg/dL    Protein, total 6.3 6.0 - 8.5 g/dL    Albumin 4.1 3.6 - 4.8 g/dL    GLOBULIN, TOTAL 2.2 1.5 - 4.5 g/dL    A-G Ratio 1.9 1.2 - 2.2    Bilirubin, total 0.5 0.0 - 1.2 mg/dL    Alk. phosphatase 124 (H) 39 - 117 IU/L    AST (SGOT) 22 0 - 40 IU/L    ALT (SGPT) 21 0 - 32 IU/L   CBC W/O DIFF   Result Value Ref Range    WBC 7.8 3.4 - 10.8 x10E3/uL    RBC 4.21 3.77 - 5.28 x10E6/uL    HGB 14.2 11.1 - 15.9 g/dL    HCT 42.8 34.0 - 46.6 %     (H) 79 - 97 fL    MCH 33.7 (H) 26.6 - 33.0 pg    MCHC 33.2 31.5 - 35.7 g/dL    RDW 13.1 12.3 - 15.4 %    PLATELET 527 200 - 160 x10E3/uL   HEMOGLOBIN A1C WITH EAG   Result Value Ref Range    Hemoglobin A1c 5.4 4.8 - 5.6 %    Estimated average glucose 108 mg/dL   CVD REPORT   Result Value Ref Range    INTERPRETATION Note          Physical Exam  Visit Vitals    /74 (BP 1 Location: Left arm, BP Patient Position: Sitting)    Pulse 68    Temp 98.1 °F (36.7 °C) (Oral)    Resp 18    Ht 5' 6\" (1.676 m)    Wt 209 lb (94.8 kg)    SpO2 99%    BMI 33.73 kg/m2     Head: Normocephalic, without obvious abnormality, atraumatic  Eyes: conjunctivae/corneas clear. PERRL, EOM's intact.    Neck: supple, symmetrical, trachea midline, no adenopathy, thyroid: not enlarged, symmetric, no tenderness/mass/nodules, no carotid bruit and no JVD  Lungs: clear to auscultation bilaterally  Heart: regular rate and rhythm, S1, S2 normal, no murmur, click, rub or gallop  Extremities: extremities normal, atraumatic, no cyanosis or edema  Pulses: 2+ and symmetric  Lymph nodes: Cervical, supraclavicular, and axillary nodes normal.  Neurologic: Grossly normal      ASSESSMENT and PLAN    ICD-10-CM ICD-9-CM    1. Essential hypertension S03 834.3 METABOLIC PANEL, COMPREHENSIVE      CBC W/O DIFF   2. Diabetes mellitus without complication ([facility]- 9438 E11.9 250.00 LIPID PANEL      HEMOGLOBIN A1C WITH EAG       DIABETES FOOT EXAM   3. Hyperlipidemia LDL goal <100 E78.5 272.4 LIPID PANEL   4. Gastroesophageal reflux disease without esophagitis K21.9 530.81    5. Seasonal allergic rhinitis due to pollen J30.1 477.0    6. Routine general medical examination at a health care facility Z00.00 V70.0    7. Obesity (BMI 30-39. 9) E66.9 278.00    8. ACP (advance care planning) Z71.89 V65.49    9. Medicare annual wellness visit, subsequent Z00.00 V70.0      Diagnoses and all orders for this visit:    1. Essential hypertension  -     METABOLIC PANEL, COMPREHENSIVE  -     CBC W/O DIFF    2. Diabetes mellitus without complication ([facility]- 9502  -     LIPID PANEL  -     HEMOGLOBIN A1C WITH EAG  -      DIABETES FOOT EXAM    3. Hyperlipidemia LDL goal <100  -     LIPID PANEL    4. Gastroesophageal reflux disease without esophagitis    5. Seasonal allergic rhinitis due to pollen    6. Routine general medical examination at a health care facility    7. Obesity (BMI 30-39.9)    8. ACP (advance care planning)    9. Medicare annual wellness visit, subsequent    Other orders  -     CVD REPORT      Follow-up Disposition: Not on File       lab results and schedule of future lab studies reviewed with patient  reviewed diet, exercise and weight control  cardiovascular risk and specific lipid/LDL goals reviewed  reviewed medications and side effects in detail  Please call my office if there are any questions- [phone].   I will arrange for follow up after the lab tests done from today return  Recommended a weekly \"heart check. \" I went into detail how to do this. Call for refills on medications as needed. Discussed expected course/resolution/complications of diagnosis in detail with patient. Medication risks/benefits/costs/interactions/alternatives discussed with patient. Pt was given an after visit summary which includes diagnoses, current medications & vitals. Pt expressed understanding with the diagnosis and plan. Total 25 minutes,60 % counseling re: Reviewed in detail the proper technique of checking the blood pressure- check it on an average day only, not on a stressful day, sitting, no exercise for at least 1 hour and not experiencing any new pain( chronic pain is OK). Patient encouraged to check BP sitting and standing at least once a month and to report these readings to me if > 130/ 80 on average , or if the standing BP is >  15 points lower than the sitting. Reviewed BP, cholesterol and diabetic goals. LDL goal<100,HDL goal>45, Triglyceride goal<150, A1C< 7.0, BP<140/90. Reviewed symptoms, or lack thereof, of hypertension and elevated cholesterol. BMI is significantly elevated- in the obese range. I reviewed diet, exercise and weight control. Discussed weight control in detail, the importance of mainly decreased carbs, and for weight maintenance, exercise; discussed different diets and that it isn't as important to watch the type of foods as it is to decrease calorie intake no matter what type of diet you do, etc.   Congratulated her on her weight loss and encouraged her to continue the same. Regular exercise is very important to your health; it helps mentally, physically, socially; it prevents injuries if done properly. Exercise, even as simple as walking 20-30 minutes daily has major benefits to your health even though your \"numbers\" are the same in the lab.  See if you can add this into your daily regimen and after a few months it will become a regular habit-\"just something you do,\" like brushing your teeth. A combination of aerobic exercise and strengthening and stretching is felt to be the best for you, so this should be your ultimate goal.   This can be done in the privacy of your home or in a group setting as at the gym  Some prefer having a , others prefer to do exercise in groups or individually. Do what \"works\" for you. You need to make it simple and \"fun,\" or you most likely you will not continue it. Recommended strongly that she check her orthostatic BP once a month to detect orthostatic hypotension. Also, discussed symptoms of concern that were noted today in the note above, treatment options( including doing nothing), when to follow up before recommended time frame. Also, answered all questions. This document was written by Eleanor Bagrer, as dictated by Tenzin Cevallos MD.  I have reviewed and agree with the above note and have made corrections where appropriate Jagdeep Stephenson M.D.

## 2018-07-12 LAB
ALBUMIN SERPL-MCNC: 4.1 G/DL (ref 3.6–4.8)
ALBUMIN/GLOB SERPL: 1.9 {RATIO} (ref 1.2–2.2)
ALP SERPL-CCNC: 124 IU/L (ref 39–117)
ALT SERPL-CCNC: 21 IU/L (ref 0–32)
AST SERPL-CCNC: 22 IU/L (ref 0–40)
BILIRUB SERPL-MCNC: 0.5 MG/DL (ref 0–1.2)
BUN SERPL-MCNC: 13 MG/DL (ref 8–27)
BUN/CREAT SERPL: 16 (ref 12–28)
CALCIUM SERPL-MCNC: 9.4 MG/DL (ref 8.7–10.3)
CHLORIDE SERPL-SCNC: 104 MMOL/L (ref 96–106)
CHOLEST SERPL-MCNC: 173 MG/DL (ref 100–199)
CO2 SERPL-SCNC: 26 MMOL/L (ref 20–29)
CREAT SERPL-MCNC: 0.83 MG/DL (ref 0.57–1)
ERYTHROCYTE [DISTWIDTH] IN BLOOD BY AUTOMATED COUNT: 13.1 % (ref 12.3–15.4)
EST. AVERAGE GLUCOSE BLD GHB EST-MCNC: 108 MG/DL
GLOBULIN SER CALC-MCNC: 2.2 G/DL (ref 1.5–4.5)
GLUCOSE SERPL-MCNC: 113 MG/DL (ref 65–99)
HBA1C MFR BLD: 5.4 % (ref 4.8–5.6)
HCT VFR BLD AUTO: 42.8 % (ref 34–46.6)
HDLC SERPL-MCNC: 64 MG/DL
HGB BLD-MCNC: 14.2 G/DL (ref 11.1–15.9)
INTERPRETATION, 910389: NORMAL
LDLC SERPL CALC-MCNC: 89 MG/DL (ref 0–99)
MCH RBC QN AUTO: 33.7 PG (ref 26.6–33)
MCHC RBC AUTO-ENTMCNC: 33.2 G/DL (ref 31.5–35.7)
MCV RBC AUTO: 102 FL (ref 79–97)
PLATELET # BLD AUTO: 216 X10E3/UL (ref 150–379)
POTASSIUM SERPL-SCNC: 4.6 MMOL/L (ref 3.5–5.2)
PROT SERPL-MCNC: 6.3 G/DL (ref 6–8.5)
RBC # BLD AUTO: 4.21 X10E6/UL (ref 3.77–5.28)
SODIUM SERPL-SCNC: 141 MMOL/L (ref 134–144)
TRIGL SERPL-MCNC: 98 MG/DL (ref 0–149)
VLDLC SERPL CALC-MCNC: 20 MG/DL (ref 5–40)
WBC # BLD AUTO: 7.8 X10E3/UL (ref 3.4–10.8)

## 2018-07-12 RX ORDER — PRAVASTATIN SODIUM 20 MG/1
TABLET ORAL
Qty: 90 TAB | Refills: 1 | Status: SHIPPED | OUTPATIENT
Start: 2018-07-12 | End: 2019-02-21 | Stop reason: SDUPTHER

## 2018-07-12 RX ORDER — ATENOLOL 50 MG/1
TABLET ORAL
Qty: 90 TAB | Refills: 1 | Status: SHIPPED | OUTPATIENT
Start: 2018-07-12 | End: 2019-02-21 | Stop reason: SDUPTHER

## 2018-07-15 NOTE — ACP (ADVANCE CARE PLANNING)
Advance Care Planning (ACP) Provider Note - Comprehensive     Date of ACP Conversation: 07/15/18  Persons included in Conversation:  patient  Length of ACP Conversation in minutes:  <16 minutes (Non-Billable)    Authorized Decision Maker (if patient is incapable of making informed decisions): This person is:  Healthcare Agent/Medical Power of  under Advance Directive          General ACP for ALL Patients with Decision Making Capacity:   Importance of advance care planning, including choosing a healthcare agent to communicate patient's healthcare decisions if patient lost the ability to make decisions, such as after a sudden illness or accident  Understanding of the healthcare agent role was assessed and information provided    Review of Existing Advance Directive:  Does this advance directive still reflect your preferences?   Yes (Provide new form/Refer for assistance in updating)    For Serious or Chronic Illness:  No known illness    Interventions Provided:  Recommended communicating the plan and making copies for the healthcare agent, personal physician, and others as appropriate (e.g., health system)  Recommended review of completed ACP document annually or upon change in health status

## 2018-07-15 NOTE — PROGRESS NOTES
Krissy Yan is a 75 y.o. female and presents for annual Medicare Wellness Visit. Problem List: Reviewed with patient and discussed risk factors. Patient Active Problem List   Diagnosis Code    Dyspareunia [id number]    Calculus of kidney N20.0    Obesity (BMI 30-39. 9) E66.9    Situational stress-elderly Mom in [place] F43.9    History of motion sickness Z87.898    Incomplete rectal prolapse- asymptomatic-2015- found on GYN exam K62.3    Diabetes mellitus without complication ([facility]- 9161 E11.9    Essential hypertension I10    Allergic rhinitis due to pollen J30.1    Gastroesophageal reflux disease without esophagitis K21.9    Benign papule- anterior to right pinna12/2015 R23.8    ACP (advance care planning) Z71.89    Hyperlipidemia LDL goal <100 E78.5       Current medical providers:  Patient Care Team:  Ulises Medina MD as PCP - General    PSH: Reviewed with patient  Past Surgical History:   Procedure Laterality Date    COLONOSCOPY N/A 10/4/2017    [facility] at [facility] ENDOSCOPY    ENDOSCOPY, COLON, DIAGNOSTIC  2007    repeat in 2017    [address]X COLONOSCOPY  2007    repeat in 2017    HX KNEE ARTHROSCOPY Left 02/15/2017    meniscus        SH: Reviewed with patient  Social History   Substance Use Topics    Smoking status: Never Smoker    Smokeless tobacco: Never Used    Alcohol use Yes      Comment: occ       FH: Reviewed with patient  Family History   Problem Relation Age of Onset    Hypertension Mother     Elevated Lipids Mother     Arrhythmia Mother      a. fib    Alcohol abuse Father     Arrhythmia Paternal Grandmother 80     had a pacemaker    Heart Attack Paternal Grandfather 79    Cancer Other      melanoma in daughter       Medications/Allergies: Reviewed with patient  Current Outpatient Prescriptions on File Prior to Visit   Medication Sig Dispense Refill    aspirin delayed-release 81 mg tablet Take 81 mg by mouth daily.  cetirizine (ZYRTEC) 10 mg tablet Take 10 mg by mouth daily.  TRANSDERM-SCOP 1.5 mg (1 mg over 3 days) pt3d Apply 1 patch transdermally every 72 hours as directed 28 Patch 0    famotidine (PEPCID) 20 mg tablet Take 20 mg by mouth two (2) times a day.  multivitamin (ONE A DAY) tablet Take 1 Tab by mouth daily.  LACTOBACILLUS ACIDOPHILUS (PROBIOTIC PO) Take  by mouth daily.  naproxen sodium (ALEVE) 220 mg tablet Take 220-440 mg by mouth two (2) times daily (with meals). Prn      acetaminophen (TYLENOL EXTRA STRENGTH) 500 mg tablet Take 2 Tabs by mouth every six (6) hours as needed for Pain. No current facility-administered medications on file prior to visit. Allergies   Allergen Reactions    Doxycycline Rash    Sulfa (Sulfonamide Antibiotics) Rash    Tetracycline Rash       Objective:  Visit Vitals    /74 (BP 1 Location: Left arm, BP Patient Position: Sitting)    Pulse 68    Temp 98.1 °F (36.7 °C) (Oral)    Resp 18    Ht 5' 6\" (1.676 m)    Wt 209 lb (94.8 kg)    SpO2 99%    BMI 33.73 kg/m2    Body mass index is 33.73 kg/(m^2). Assessment of cognitive impairment: Alert and oriented x 3    Depression Screen:   PHQ over the last two weeks 7/11/2018   Little interest or pleasure in doing things Not at all   Feeling down, depressed or hopeless Not at all   Total Score PHQ 2 0       Fall Risk Assessment:    Fall Risk Assessment, last 12 mths 7/11/2018   Able to walk? Yes   Fall in past 12 months? No   Fall with injury? -   Number of falls in past 12 months -   Fall Risk Score -       Functional Ability:   Does the patient exhibit a steady gait? yes   How long did it take the patient to get up and walk from a sitting position? 4-5 seconds     Is the patient self reliant?  (ie can do own laundry, meals, household chores)  yes     Does the patient handle his/her own medications? yes     Does the patient handle his/her own money?    yes     Is the patients home safe (ie good lighting, handrails on stairs and bath, etc.)? yes     Did you notice or did patient express any hearing difficulties? no     Did you notice or did patient express any vision difficulties?   no     Were distance and reading eye charts used? Yes. Patient's  full eye exam by an ophthalmologist every 2 years is unremarkable: no glaucoma or macular degeneration. Advance Care Planning:   Patient was offered the opportunity to discuss advance care planning:  yes     Does patient have an Advance Directive:  Yes. I reviewed advanced directive information and written information given to patient; patient to make follow up appointment with a NN for any questions,to review choices made for health care, agent, and anatomical gifts that are on the advanced directive at home. If no, did you provide information on Caring Connections? yes       Plan:      Orders Placed This Encounter    LIPID PANEL    METABOLIC PANEL, COMPREHENSIVE    CBC W/O DIFF    HEMOGLOBIN A1C WITH EAG    CVD REPORT    HM DIABETES FOOT EXAM       Health Maintenance   Topic Date Due    MEDICARE YEARLY EXAM  05/02/2018    BREAST CANCER SCRN MAMMOGRAM  11/07/2018    Influenza Age 5 to Adult  08/01/2018    EYE EXAM RETINAL OR DILATED Q1  12/12/2018    MICROALBUMIN Q1  12/13/2018    HEMOGLOBIN A1C Q6M  01/11/2019    FOOT EXAM Q1  07/11/2019    LIPID PANEL Q1  07/11/2019    GLAUCOMA SCREENING Q2Y  12/12/2019    DTaP/Tdap/Td series (2 - Td) 10/25/2020    COLONOSCOPY  10/04/2027    Hepatitis C Screening  Addressed    Bone Densitometry (Dexa) Screening  Completed    ZOSTER VACCINE AGE 60>  Completed    Pneumococcal 65+ Low/Medium Risk  Completed       *Patient verbalized understanding and agreement with the plan. A copy of the After Visit Summary with personalized health plan was given to the patient today.

## 2018-08-22 RX ORDER — MOXIFLOXACIN 5 MG/ML
1 SOLUTION/ DROPS OPHTHALMIC 3 TIMES DAILY
COMMUNITY
End: 2019-11-24 | Stop reason: ALTCHOICE

## 2018-08-22 NOTE — PERIOP NOTES
[facility]  Ambulatory Surgery Unit  Pre-operative Instructions    Surgery/Procedure Date  Wednesday, August 29, 2018            Tentative Arrival Time 6444      1. On the day of your surgery/procedure, please report to the Ambulatory Surgery Unit Registration Desk and sign in at your designated time. The Ambulatory Surgery Unit is located in [facility] on the Critical access hospital side of the [facility] across from the [address] [place] [address]. Please have all of your health insurance cards and a photo ID. 2. You must have someone with you to drive you home, as you should not drive a car for 24 hours following anesthesia. Please make arrangements for a responsible adult friend or family member to stay with you for at least the first 24 hours after your surgery. 3. Do not have anything to eat or drink (including water, gum, mints, coffee, juice) after midnight, Tuesday. This may not apply to medications prescribed by your physician. (Please note below the special instructions with medications to take the morning of surgery, if applicable.)    4. We recommend you do not drink any alcoholic beverages for 24 hours before and after your surgery. 5. Contact your surgeons office for instructions on the following medications: non-steroidal anti-inflammatory drugs (i.e. Advil, Aleve), vitamins, and supplements. (Some surgeons will want you to stop these medications prior to surgery and others may allow you to take them)   **If you are currently taking Plavix, Coumadin, Aspirin and/or other blood-thinning agents, contact your surgeon for instructions. ** Your surgeon will partner with the physician prescribing these medications to determine if it is safe to stop or if you need to continue taking. Please do not stop taking these medications without instructions from your surgeon.     6. In an effort to help prevent surgical site infection, we ask that you shower with an anti-bacterial soap (i.e. Dial or Safeguard) for 3 days prior to and on the morning of surgery, using a fresh towel after each shower. (Please begin this process with fresh bed linens.) Do not apply any lotions, powders, or deodorants after the shower on the day of your procedure. If applicable, please do not shave the operative site for 48 hours prior to surgery. 7. Wear comfortable clothes. Wear glasses instead of contacts. Do not bring any jewelry or money (other than copays or fees as instructed). Do not wear make-up, particularly mascara, the morning of your surgery. Do not wear nail polish, particularly if you are having foot /hand surgery. Wear your hair loose or down, no ponytails, buns, bryant pins or clips. All body piercings must be removed. 8. You should understand that if you do not follow these instructions your surgery may be cancelled. If your physical condition changes (i.e. fever, cold or flu) please contact your surgeon as soon as possible. 9. It is important that you be on time. If a situation occurs where you may be late, or if you have any questions or problems, please call [phone].    10. Your surgery time may be subject to change. You will receive a phone call the day prior to surgery to confirm your arrival time. 11. Pediatric patients: please bring a change of clothes, diapers, bottle/sippy cup, pacifier, etc.      Special Instructions: Take all medications and inhalers, as prescribed, on the morning of surgery with a sip of water EXCEPT: no over the counter medications day of surgery    I understand a pre-operative phone call will be made to verify my surgery time. In the event that I am not available, I give permission for a message to be left on my answering service and/or with another person?       yes    Preop instructions reviewed  Pt verbalized understanding.      ___________________      ___________________      ________________  (Signature of Patient)          (Witness) (Date and Time)

## 2018-08-27 ENCOUNTER — OFFICE VISIT (OUTPATIENT)
Dept: FAMILY MEDICINE CLINIC | Age: 68
End: 2018-08-27

## 2018-08-27 VITALS
DIASTOLIC BLOOD PRESSURE: 84 MMHG | HEART RATE: 60 BPM | WEIGHT: 208 LBS | OXYGEN SATURATION: 98 % | BODY MASS INDEX: 33.43 KG/M2 | SYSTOLIC BLOOD PRESSURE: 122 MMHG | HEIGHT: 66 IN | RESPIRATION RATE: 16 BRPM | TEMPERATURE: 97.2 F

## 2018-08-27 DIAGNOSIS — E11.9 DIABETES MELLITUS WITHOUT COMPLICATION (HCC): ICD-10-CM

## 2018-08-27 DIAGNOSIS — I10 ESSENTIAL HYPERTENSION: Primary | ICD-10-CM

## 2018-08-27 DIAGNOSIS — K21.9 GASTROESOPHAGEAL REFLUX DISEASE WITHOUT ESOPHAGITIS: ICD-10-CM

## 2018-08-27 DIAGNOSIS — E78.5 HYPERLIPIDEMIA LDL GOAL <100: ICD-10-CM

## 2018-08-27 DIAGNOSIS — H25.9 AGE-RELATED CATARACT OF BOTH EYES, UNSPECIFIED AGE-RELATED CATARACT TYPE: ICD-10-CM

## 2018-08-27 DIAGNOSIS — J30.1 SEASONAL ALLERGIC RHINITIS DUE TO POLLEN: ICD-10-CM

## 2018-08-27 RX ORDER — PREDNISOLONE ACETATE 10 MG/ML
SUSPENSION/ DROPS OPHTHALMIC 3 TIMES DAILY
COMMUNITY
Start: 2018-08-26 | End: 2019-11-24 | Stop reason: ALTCHOICE

## 2018-08-27 RX ORDER — ZOSTER VACCINE RECOMBINANT, ADJUVANTED 50 MCG/0.5
KIT INTRAMUSCULAR
COMMUNITY
Start: 2018-05-31 | End: 2018-08-27 | Stop reason: ALTCHOICE

## 2018-08-27 NOTE — MR AVS SNAPSHOT
Bebo Elliott 
 
 
 [address] [place] [address] 
[phone] Patient: Penny Doan MRN: [id number] TIQ:[birth year] Visit Information Date & Time Provider Department Dept. Phone Encounter #  
 8/27/2018 11:00 AM Jason Leo MD [address] [place] [phone] [id number] Your Appointments 1/14/2019 10:45 AM  
ROUTINE CARE with Jason Leo MD  
[facility]) Appt Note: fasting cholesterol; fasting cholesterol [address] [address]  
[phone]  
  
   
 [address] [address] Upcoming Health Maintenance Date Due  
 BREAST CANCER SCRN MAMMOGRAM 11/7/2018 Influenza Age 5 to Adult 3/31/2019* EYE EXAM RETINAL OR DILATED Q1 12/12/2018 MICROALBUMIN Q1 12/13/2018 HEMOGLOBIN A1C Q6M 1/11/2019 LIPID PANEL Q1 7/11/2019 MEDICARE YEARLY EXAM 7/12/2019 FOOT EXAM Q1 7/15/2019 GLAUCOMA SCREENING Q2Y 12/12/2019 DTaP/Tdap/Td series (2 - Td) 10/25/2020 COLONOSCOPY 10/4/2027 *Topic was postponed. The date shown is not the original due date. Allergies as of 8/27/2018  Review Complete On: 8/27/2018 By: Martina Flaherty LPN Severity Noted Reaction Type Reactions Doxycycline  05/30/2012   Systemic Rash  
 Sulfa (Sulfonamide Antibiotics)  08/09/2010    Rash Tetracycline  08/09/2010    Rash Current Immunizations  Never Reviewed Name Date Influenza High Dose Vaccine PF 10/12/2017 12:00 AM, 10/3/2016, 10/16/2015 Pneumococcal Conjugate (PCV-13) 10/21/2015 Pneumococcal Polysaccharide (PPSV-23) 1/23/2017 TDAP Vaccine 10/25/2010 Zoster Recombinant 5/31/2018 12:00 AM  
 Zoster Vaccine, Live 1/23/2013 Not reviewed this visit Vitals BP Pulse Temp Resp Height(growth percentile) Weight(growth percentile)  122/84 (BP 1 Location: Left arm, BP Patient Position: Sitting) 60 97.2 °F (36.2 °C) (Oral) 16 5' 5.5\" (1.664 m) 208 lb (94.3 kg) SpO2 BMI OB Status Smoking Status 98% 34.09 kg/m2 Postmenopausal Never Smoker BMI and BSA Data Body Mass Index Body Surface Area 34.09 kg/m 2 2.09 m 2 Preferred Pharmacy Pharmacy Name Phone [address] [facility], [address] [facility] [address] Inga Sherman Your Updated Medication List  
  
   
This list is accurate as of 8/27/18  1:05 PM.  Always use your most recent med list.  
  
  
  
  
 ALEVE 220 mg tablet Generic drug:  naproxen sodium Take 220-440 mg by mouth two (2) times daily (with meals). Prn  
  
 aspirin delayed-release 81 mg tablet Take 81 mg by mouth daily. atenolol 50 mg tablet Commonly known as:  TENORMIN  
TAKE 1 TABLET BY MOUTH  DAILY  
  
 multivitamin tablet Commonly known as:  ONE A DAY Take 1 Tab by mouth daily. PEPCID 20 mg tablet Generic drug:  famotidine Take 20 mg by mouth daily. pravastatin 20 mg tablet Commonly known as:  PRAVACHOL  
TAKE 1 TABLET BY MOUTH  NIGHTLY  
  
 prednisoLONE acetate 1 % ophthalmic suspension Commonly known as:  PRED FORTE  
  
 PROBIOTIC PO Take  by mouth daily. TRANSDERM-SCOP 1 mg over 3 days Pt3d Generic drug:  scopolamine Apply 1 patch transdermally every 72 hours as directed TYLENOL EXTRA STRENGTH 500 mg tablet Generic drug:  acetaminophen Take 2 Tabs by mouth every six (6) hours as needed for Pain. VIGAMOX 0.5 % ophthalmic solution Generic drug:  moxifloxacin Administer 1 Drop to right eye three (3) times daily. ZyrTEC 10 mg tablet Generic drug:  cetirizine Take 10 mg by mouth daily. Introducing [facility]! Dear Arline Mcrae: Thank you for requesting a Dubb account. Our records indicate that you already have an active Dubb account. You can access your account anytime at [email or web address] Did you know that you can access your hospital and ER discharge instructions at any time in Soshow[employer]? You can also review all of your test results from your hospital stay or ER visit. Additional Information If you have questions, please visit the Frequently Asked Questions section of the Soshow[employer] website at [email or web address] Remember, Soshowise is NOT to be used for urgent needs. For medical emergencies, dial 911. Now available from your iPhone and Android! Please provide this summary of care documentation to your next provider. Your primary care clinician is listed as [address] [address], [facility]/s . If you have any questions after today's visit, please call [phone].

## 2018-08-27 NOTE — PROGRESS NOTES
Chief Complaint   Patient presents with    Pre-op Exam     pt having right eye cataract surgery on 8/29/18   left eye on 9/5/18 with Dr. Al Ch DOCUMENTATION\"  1. Have you been to the ER, urgent care clinic since your last visit? Hospitalized since your last visit? No    2. Have you seen or consulted any other health care providers outside of the [address] [place] [address] since your last visit? Include any pap smears or colon screening.  No

## 2018-08-27 NOTE — PROGRESS NOTES
HISTORY OF PRESENT ILLNESS  HPI  Sandy Lopez is a 75 y.o. Female with a history of HTN, GERD, DM and hyperlipidemia with LDL goal <100, who presents at the office today for a pre-op exam. Pt has right cataract surgery scheduled on 8/29 and left cataract surgery on 9/5. Pt denies unusual SOB, chest pain, and any recent ER visits or hospitalizations. Past Medical History:   Diagnosis Date    Adverse effect of anesthesia     treats pt for motion sickness with a patch    AR (allergic rhinitis)     Calculus of kidney     Dyspareunia     GERD (gastroesophageal reflux disease) 11/30/2010    HBP (high blood pressure) 8/9/2010    History of motion sickness 3/29/2014    Incomplete rectal prolapse- asymptomatic-2015- found on GYN exam 8/6/2015    Obesity (BMI 30-39.9) 6/30/2013     Past Surgical History:   Procedure Laterality Date    COLONOSCOPY N/A 10/4/2017    COLONOSCOPY performed by Nate Elizabeth MD at \[facility]\"" ENDOSCOPY    [facility] HX COLONOSCOPY  2007    repeat in 2017    HX HEENT Right     macular hole repair on right eye    HX KNEE ARTHROSCOPY Left 02/15/2017    meniscus     Current Outpatient Prescriptions on File Prior to Visit   Medication Sig Dispense Refill    moxifloxacin (VIGAMOX) 0.5 % ophthalmic solution Administer 1 Drop to right eye three (3) times daily.  atenolol (TENORMIN) 50 mg tablet TAKE 1 TABLET BY MOUTH  DAILY 90 Tab 1    pravastatin (PRAVACHOL) 20 mg tablet TAKE 1 TABLET BY MOUTH  NIGHTLY 90 Tab 1    aspirin delayed-release 81 mg tablet Take 81 mg by mouth daily.  cetirizine (ZYRTEC) 10 mg tablet Take 10 mg by mouth daily.  famotidine (PEPCID) 20 mg tablet Take 20 mg by mouth daily.  multivitamin (ONE A DAY) tablet Take 1 Tab by mouth daily.  LACTOBACILLUS ACIDOPHILUS (PROBIOTIC PO) Take  by mouth daily.  naproxen sodium (ALEVE) 220 mg tablet Take 220-440 mg by mouth two (2) times daily (with meals).  Prn      acetaminophen (TYLENOL EXTRA STRENGTH) 500 mg tablet Take 2 Tabs by mouth every six (6) hours as needed for Pain. No current facility-administered medications on file prior to visit. Allergies   Allergen Reactions    Doxycycline Rash    Sulfa (Sulfonamide Antibiotics) Rash    Tetracycline Rash     Family History   Problem Relation Age of Onset    Hypertension Mother    [facility] Elevated Lipids Mother     Arrhythmia Mother      a. fib    Alcohol abuse Father     Arrhythmia Paternal Grandmother 80     had a pacemaker    Heart Attack Paternal Grandfather 79    Cancer Other      melanoma in daughter     Social History     Social History    Marital status:      Spouse name: N/A    Number of children: N/A    Years of education: N/A     Social History Main Topics    Smoking status: Never Smoker    Smokeless tobacco: Never Used    Alcohol use Yes      Comment: occ    Drug use: No    Sexual activity: Yes     Other Topics Concern     Service No    Blood Transfusions No    Caffeine Concern No    Occupational Exposure No    Hobby Hazards No    Sleep Concern No    Stress Concern No    Weight Concern Yes    Special Diet No    Back Care No    Exercise Yes     walking    Bike Helmet No     does not ride   [place] Yes    Self-Exams Yes     Social History Narrative             Review of Systems   Constitutional: Negative for chills, diaphoresis, fever, malaise/fatigue and weight loss. HENT: Negative for congestion, ear discharge, ear pain, hearing loss, nosebleeds, sore throat and tinnitus. Eyes: Negative for blurred vision, double vision, photophobia, pain, discharge and redness. Respiratory: Negative for cough, hemoptysis, sputum production, shortness of breath, wheezing and stridor. Cardiovascular: Negative for chest pain, palpitations, orthopnea, claudication, leg swelling and PND.    Gastrointestinal: Negative for abdominal pain, blood in stool, constipation, diarrhea, heartburn, melena, nausea and vomiting. Genitourinary: Negative for dysuria, flank pain, frequency, hematuria and urgency. Musculoskeletal: Negative for back pain, falls, joint pain, myalgias and neck pain. Skin: Negative for itching and rash. Neurological: Negative for dizziness, tingling, tremors, sensory change, speech change, focal weakness, seizures, loss of consciousness, weakness and headaches. Endo/Heme/Allergies: Negative for environmental allergies and polydipsia. Does not bruise/bleed easily. Psychiatric/Behavioral: Negative for depression, hallucinations, memory loss, substance abuse and suicidal ideas. The patient is not nervous/anxious and does not have insomnia. Results for orders placed or performed in visit on 07/11/18   LIPID PANEL   Result Value Ref Range    Cholesterol, total 173 100 - 199 mg/dL    Triglyceride 98 0 - 149 mg/dL    HDL Cholesterol 64 >39 mg/dL    VLDL, calculated 20 5 - 40 mg/dL    LDL, calculated 89 0 - 99 mg/dL   METABOLIC PANEL, COMPREHENSIVE   Result Value Ref Range    Glucose 113 (H) 65 - 99 mg/dL    BUN 13 8 - 27 mg/dL    Creatinine 0.83 0.57 - 1.00 mg/dL    GFR est non-AA 73 >59 mL/min/1.73    GFR est AA 84 >59 mL/min/1.73    BUN/Creatinine ratio 16 12 - 28    Sodium 141 134 - 144 mmol/L    Potassium 4.6 3.5 - 5.2 mmol/L    Chloride 104 96 - 106 mmol/L    CO2 26 20 - 29 mmol/L    Calcium 9.4 8.7 - 10.3 mg/dL    Protein, total 6.3 6.0 - 8.5 g/dL    Albumin 4.1 3.6 - 4.8 g/dL    GLOBULIN, TOTAL 2.2 1.5 - 4.5 g/dL    A-G Ratio 1.9 1.2 - 2.2    Bilirubin, total 0.5 0.0 - 1.2 mg/dL    Alk.  phosphatase 124 (H) 39 - 117 IU/L    AST (SGOT) 22 0 - 40 IU/L    ALT (SGPT) 21 0 - 32 IU/L   CBC W/O DIFF   Result Value Ref Range    WBC 7.8 3.4 - 10.8 x10E3/uL    RBC 4.21 3.77 - 5.28 x10E6/uL    HGB 14.2 11.1 - 15.9 g/dL    HCT 42.8 34.0 - 46.6 %     (H) 79 - 97 fL    MCH 33.7 (H) 26.6 - 33.0 pg    MCHC 33.2 31.5 - 35.7 g/dL    RDW 13.1 12.3 - 15.4 % PLATELET 555 674 - 181 x10E3/uL   HEMOGLOBIN A1C WITH EAG   Result Value Ref Range    Hemoglobin A1c 5.4 4.8 - 5.6 %    Estimated average glucose 108 mg/dL   CVD REPORT   Result Value Ref Range    INTERPRETATION Note          Physical Exam  Visit Vitals    /84 (BP 1 Location: Left arm, BP Patient Position: Sitting)    Pulse 60    Temp 97.2 °F (36.2 °C) (Oral)    Resp 16    Ht 5' 5.5\" (1.664 m)    Wt 208 lb (94.3 kg)    SpO2 98%    BMI 34.09 kg/m2       General:  Alert, cooperative, no distress, appears stated age. Head:  Normocephalic, without obvious abnormality, atraumatic. Eyes:  Conjunctivae/corneas clear. PERRL, EOMs intact. Fundi benign. Has moderate cataracts bilaterally. Rest of exam deferred to Dr. Suzanna Bridges. Ears:  Normal TMs and external ear canals both ears. Nose: Nares normal. Septum midline. Mucosa normal. No drainage or sinus tenderness. Throat: Lips, mucosa, and tongue normal. Teeth and gums normal.   Neck: Supple, symmetrical, trachea midline, no adenopathy, thyroid: no enlargement/tenderness/nodules, no carotid bruit and no JVD. Back:   Symmetric, no curvature. ROM normal. No CVA tenderness. Lungs:   Clear to auscultation bilaterally. Chest wall:  No tenderness or deformity. Heart:  Regular rate and rhythm, S1, S2 normal, no murmur, click, rub or gallop. Abdomen:   Soft, non-tender. Bowel sounds normal. No masses,  No organomegaly. Extremities: Extremities normal, atraumatic, no cyanosis or edema. Pulses: 2+ and symmetric all extremities. Skin: Skin color, texture, turgor normal. No rashes or lesions. Lymph nodes: Cervical, supraclavicular, and axillary nodes normal.   Neurologic: CNII-XII intact. Normal strength, sensation and reflexes throughout. ASSESSMENT and PLAN    ICD-10-CM ICD-9-CM    1. Essential hypertension I10 401.9    2. Hyperlipidemia LDL goal <100 E78.5 272.4    3. Diabetes mellitus without complication ([facility]- 9387 E11.9 250.00    4. Seasonal allergic rhinitis due to pollen J30.1 477.0    5. Gastroesophageal reflux disease without esophagitis K21.9 530.81    6. Age-related cataract of both eyes, unspecified age-related cataract type H25.9 366.10 prednisoLONE acetate (PRED FORTE) 1 % ophthalmic suspension     Diagnoses and all orders for this visit:    1. Essential hypertension    2. Hyperlipidemia LDL goal <100    3. Diabetes mellitus without complication ([facility]- 3124    4. Seasonal allergic rhinitis due to pollen    5. Gastroesophageal reflux disease without esophagitis    6. Age-related cataract of both eyes, unspecified age-related cataract type    Follow-up Disposition:  Return in about 6 months (around 2/27/2019), or BP OOC or significant weight gain, for F/U hypertension, F/U of obesity. eviewed medications and side effects in detail  Please call my office if there are any questions- [phone]. Discussed expected course/resolution/complications of diagnosis in detail with patient. Medication risks/benefits/costs/interactions/alternatives discussed with patient. Pt was given an after visit summary which includes diagnoses, current medications & vitals. Pt expressed understanding with the diagnosis and plan. Patient to call if no better in 3 -4 days and prn new problems. Reviewed in detail the proper technique of checking the blood pressure- check it on an average day only, not on a stressful day, sitting, no exercise for at least 1 hour and not experiencing any new pain( chronic pain is OK). Patient encouraged to check BP sitting and standing at least once a month and to report these readings to me if > 130/ 80 on average , or if the standing BP is >  15 points lower than the sitting. Reviewed symptoms, or lack thereof, of hypertension and elevated cholesterol. Recommended a weekly \"heart check. \" I went into detail how to do this. BMI is significantly elevated- in the obese range.  I reviewed diet, exercise and weight control. Discussed weight control in detail, the importance of mainly decreased carbs, and for weight maintenance, exercise; discussed different diets and that it isn't as important to watch the type of foods as it is to decrease calorie intake no matter what type of diet you do, etc.      Regular exercise is very important to your health; it helps mentally, physically, socially; it prevents injuries if done properly. Exercise, even as simple as walking 20-30 minutes daily has major benefits to your health even though your \"numbers\" are the same in the lab. See if you can add this into your daily regimen and after a few months it will become a regular habit-\"just something you do,\" like brushing your teeth. A combination of aerobic exercise and strengthening and stretching is felt to be the best for you, so this should be your ultimate goal.   This can be done in the privacy of your home or in a group setting as at the gym  Some prefer having a , others prefer to do exercise in groups or individually. Do what \"works\" for you. You need to make it simple and \"fun,\" or you most likely you will not continue it. Pt had some retinal problems( detachment with her right eye and is interested to see how much it improves with cataract removal.     She is cleared for her right eye surgery on 8/29, and her left surgery on 9/5. This document was written by Ruthie Morillo, as dictated by Joel Parish MD.  I have reviewed and agree with the above note and have made corrections where appropriate Jagdeep Hudson M.D.

## 2018-08-28 ENCOUNTER — ANESTHESIA EVENT (OUTPATIENT)
Dept: SURGERY | Age: 68
End: 2018-08-28
Payer: MEDICARE

## 2018-08-28 PROBLEM — H25.811 COMBINED FORMS OF AGE-RELATED CATARACT OF RIGHT EYE: Status: ACTIVE | Noted: 2018-08-28

## 2018-08-29 ENCOUNTER — HOSPITAL ENCOUNTER (OUTPATIENT)
Age: 68
Setting detail: OUTPATIENT SURGERY
Discharge: HOME | End: 2018-08-29
Attending: OPHTHALMOLOGY | Admitting: OPHTHALMOLOGY
Payer: MEDICARE

## 2018-08-29 ENCOUNTER — ANESTHESIA (OUTPATIENT)
Dept: SURGERY | Age: 68
End: 2018-08-29
Payer: MEDICARE

## 2018-08-29 VITALS
BODY MASS INDEX: 33.11 KG/M2 | HEIGHT: 66 IN | HEART RATE: 51 BPM | WEIGHT: 206 LBS | TEMPERATURE: 97.6 F | DIASTOLIC BLOOD PRESSURE: 65 MMHG | SYSTOLIC BLOOD PRESSURE: 131 MMHG | OXYGEN SATURATION: 97 % | RESPIRATION RATE: 14 BRPM

## 2018-08-29 PROCEDURE — 76210000046 HC AMBSU PH II REC FIRST 0.5 HR: Performed by: OPHTHALMOLOGY

## 2018-08-29 PROCEDURE — 76030000000 HC AMB SURG OR TIME 0.5 TO 1: Performed by: OPHTHALMOLOGY

## 2018-08-29 PROCEDURE — 77030018846 HC SOL IRR STRL H20 ICUM -A: Performed by: OPHTHALMOLOGY

## 2018-08-29 PROCEDURE — 74011000250 HC RX REV CODE- 250: Performed by: OPHTHALMOLOGY

## 2018-08-29 PROCEDURE — 77030021352 HC CBL LD SYS DISP COVD -B: Performed by: OPHTHALMOLOGY

## 2018-08-29 PROCEDURE — 74011250636 HC RX REV CODE- 250/636

## 2018-08-29 PROCEDURE — 76060000074: Performed by: OPHTHALMOLOGY

## 2018-08-29 PROCEDURE — 74011250636 HC RX REV CODE- 250/636: Performed by: OPHTHALMOLOGY

## 2018-08-29 PROCEDURE — 76060000061 HC AMB SURG ANES 0.5 TO 1 HR: Performed by: OPHTHALMOLOGY

## 2018-08-29 PROCEDURE — 74011000250 HC RX REV CODE- 250

## 2018-08-29 PROCEDURE — 76060000071 HC AMB SURG ANES 6 TO 6.5 HR: Performed by: OPHTHALMOLOGY

## 2018-08-29 PROCEDURE — 76030000012 HC AMB SURG OR TIME 6 TO 6.5: Performed by: OPHTHALMOLOGY

## 2018-08-29 PROCEDURE — V2632 POST CHMBR INTRAOCULAR LENS: HCPCS | Performed by: OPHTHALMOLOGY

## 2018-08-29 PROCEDURE — 74011250637 HC RX REV CODE- 250/637: Performed by: OPHTHALMOLOGY

## 2018-08-29 DEVICE — LENS IOL POST 1-PC 6X13 22.0 -- ACRYSOF: Type: IMPLANTABLE DEVICE | Site: EYE | Status: FUNCTIONAL

## 2018-08-29 RX ORDER — DICLOFENAC SODIUM 1 MG/ML
1 SOLUTION/ DROPS OPHTHALMIC
Status: COMPLETED | OUTPATIENT
Start: 2018-08-29 | End: 2018-08-29

## 2018-08-29 RX ORDER — SODIUM CHLORIDE 0.9 % (FLUSH) 0.9 %
5-10 SYRINGE (ML) INJECTION AS NEEDED
Status: DISCONTINUED | OUTPATIENT
Start: 2018-08-29 | End: 2018-08-29 | Stop reason: HOSPADM

## 2018-08-29 RX ORDER — PREDNISOLONE ACETATE 10 MG/ML
1 SUSPENSION/ DROPS OPHTHALMIC 2 TIMES DAILY
Status: DISCONTINUED | OUTPATIENT
Start: 2018-08-29 | End: 2018-08-29 | Stop reason: HOSPADM

## 2018-08-29 RX ORDER — DIPHENHYDRAMINE HYDROCHLORIDE 50 MG/ML
12.5 INJECTION, SOLUTION INTRAMUSCULAR; INTRAVENOUS AS NEEDED
Status: DISCONTINUED | OUTPATIENT
Start: 2018-08-29 | End: 2018-08-29 | Stop reason: HOSPADM

## 2018-08-29 RX ORDER — DICLOFENAC SODIUM 1 MG/ML
SOLUTION/ DROPS OPHTHALMIC
Status: COMPLETED
Start: 2018-08-29 | End: 2018-08-29

## 2018-08-29 RX ORDER — SODIUM CHLORIDE 0.9 % (FLUSH) 0.9 %
5-10 SYRINGE (ML) INJECTION EVERY 8 HOURS
Status: DISCONTINUED | OUTPATIENT
Start: 2018-08-29 | End: 2018-08-29 | Stop reason: HOSPADM

## 2018-08-29 RX ORDER — ONDANSETRON 2 MG/ML
4 INJECTION INTRAMUSCULAR; INTRAVENOUS AS NEEDED
Status: DISCONTINUED | OUTPATIENT
Start: 2018-08-29 | End: 2018-08-29 | Stop reason: HOSPADM

## 2018-08-29 RX ORDER — CYCLOPENTOLATE HYDROCHLORIDE 20 MG/ML
1 SOLUTION/ DROPS OPHTHALMIC
Status: COMPLETED | OUTPATIENT
Start: 2018-08-29 | End: 2018-08-29

## 2018-08-29 RX ORDER — ERYTHROMYCIN 5 MG/G
OINTMENT OPHTHALMIC
Status: COMPLETED | OUTPATIENT
Start: 2018-08-29 | End: 2018-08-29

## 2018-08-29 RX ORDER — SODIUM CHLORIDE 9 MG/ML
25 INJECTION, SOLUTION INTRAVENOUS CONTINUOUS
Status: DISCONTINUED | OUTPATIENT
Start: 2018-08-29 | End: 2018-08-29 | Stop reason: HOSPADM

## 2018-08-29 RX ORDER — LIDOCAINE HYDROCHLORIDE 20 MG/ML
5 INJECTION, SOLUTION EPIDURAL; INFILTRATION; INTRACAUDAL; PERINEURAL ONCE
Status: COMPLETED | OUTPATIENT
Start: 2018-08-29 | End: 2018-08-29

## 2018-08-29 RX ORDER — LIDOCAINE HYDROCHLORIDE 10 MG/ML
0.5 INJECTION, SOLUTION EPIDURAL; INFILTRATION; INTRACAUDAL; PERINEURAL ONCE
Status: COMPLETED | OUTPATIENT
Start: 2018-08-29 | End: 2018-08-29

## 2018-08-29 RX ORDER — SODIUM CHLORIDE, SODIUM LACTATE, POTASSIUM CHLORIDE, CALCIUM CHLORIDE 600; 310; 30; 20 MG/100ML; MG/100ML; MG/100ML; MG/100ML
25 INJECTION, SOLUTION INTRAVENOUS CONTINUOUS
Status: DISCONTINUED | OUTPATIENT
Start: 2018-08-29 | End: 2018-08-29 | Stop reason: HOSPADM

## 2018-08-29 RX ORDER — PROPARACAINE HYDROCHLORIDE 5 MG/ML
1 SOLUTION/ DROPS OPHTHALMIC
Status: COMPLETED | OUTPATIENT
Start: 2018-08-29 | End: 2018-08-29

## 2018-08-29 RX ORDER — LIDOCAINE HYDROCHLORIDE 10 MG/ML
0.1 INJECTION, SOLUTION EPIDURAL; INFILTRATION; INTRACAUDAL; PERINEURAL AS NEEDED
Status: DISCONTINUED | OUTPATIENT
Start: 2018-08-29 | End: 2018-08-29 | Stop reason: HOSPADM

## 2018-08-29 RX ORDER — MIDAZOLAM HYDROCHLORIDE 1 MG/ML
INJECTION, SOLUTION INTRAMUSCULAR; INTRAVENOUS AS NEEDED
Status: DISCONTINUED | OUTPATIENT
Start: 2018-08-29 | End: 2018-08-29 | Stop reason: HOSPADM

## 2018-08-29 RX ORDER — PHENYLEPHRINE HYDROCHLORIDE 25 MG/ML
1 SOLUTION/ DROPS OPHTHALMIC
Status: COMPLETED | OUTPATIENT
Start: 2018-08-29 | End: 2018-08-29

## 2018-08-29 RX ORDER — FENTANYL CITRATE 50 UG/ML
25 INJECTION, SOLUTION INTRAMUSCULAR; INTRAVENOUS
Status: DISCONTINUED | OUTPATIENT
Start: 2018-08-29 | End: 2018-08-29 | Stop reason: HOSPADM

## 2018-08-29 RX ADMIN — MIDAZOLAM HYDROCHLORIDE 1 MG: 1 INJECTION, SOLUTION INTRAMUSCULAR; INTRAVENOUS at 07:45

## 2018-08-29 RX ADMIN — PHENYLEPHRINE HYDROCHLORIDE 1 DROP: 25 SOLUTION/ DROPS OPHTHALMIC at 07:08

## 2018-08-29 RX ADMIN — CYCLOPENTOLATE HYDROCHLORIDE 1 DROP: 20 SOLUTION/ DROPS OPHTHALMIC at 07:08

## 2018-08-29 RX ADMIN — CYCLOPENTOLATE HYDROCHLORIDE 1 DROP: 20 SOLUTION/ DROPS OPHTHALMIC at 07:12

## 2018-08-29 RX ADMIN — PROPARACAINE HYDROCHLORIDE 1 DROP: 5 SOLUTION/ DROPS OPHTHALMIC at 07:08

## 2018-08-29 RX ADMIN — PROPARACAINE HYDROCHLORIDE 1 DROP: 5 SOLUTION/ DROPS OPHTHALMIC at 07:04

## 2018-08-29 RX ADMIN — PHENYLEPHRINE HYDROCHLORIDE 1 DROP: 25 SOLUTION/ DROPS OPHTHALMIC at 07:12

## 2018-08-29 RX ADMIN — DICLOFENAC SODIUM 1 DROP: 1 SOLUTION OPHTHALMIC at 06:58

## 2018-08-29 RX ADMIN — PROPARACAINE HYDROCHLORIDE 1 DROP: 5 SOLUTION/ DROPS OPHTHALMIC at 07:14

## 2018-08-29 RX ADMIN — DICLOFENAC SODIUM 1 DROP: 1 SOLUTION/ DROPS OPHTHALMIC at 06:58

## 2018-08-29 RX ADMIN — PROPARACAINE HYDROCHLORIDE 1 DROP: 5 SOLUTION/ DROPS OPHTHALMIC at 06:58

## 2018-08-29 RX ADMIN — MIDAZOLAM HYDROCHLORIDE 1 MG: 1 INJECTION, SOLUTION INTRAMUSCULAR; INTRAVENOUS at 08:03

## 2018-08-29 RX ADMIN — CYCLOPENTOLATE HYDROCHLORIDE 1 DROP: 20 SOLUTION/ DROPS OPHTHALMIC at 07:04

## 2018-08-29 RX ADMIN — CYCLOPENTOLATE HYDROCHLORIDE 1 DROP: 20 SOLUTION/ DROPS OPHTHALMIC at 06:58

## 2018-08-29 RX ADMIN — DICLOFENAC SODIUM 1 DROP: 1 SOLUTION/ DROPS OPHTHALMIC at 07:08

## 2018-08-29 RX ADMIN — DICLOFENAC SODIUM 1 DROP: 1 SOLUTION/ DROPS OPHTHALMIC at 07:12

## 2018-08-29 RX ADMIN — SODIUM CHLORIDE 25 ML/HR: 900 INJECTION, SOLUTION INTRAVENOUS at 06:57

## 2018-08-29 RX ADMIN — PHENYLEPHRINE HYDROCHLORIDE 1 DROP: 25 SOLUTION/ DROPS OPHTHALMIC at 07:04

## 2018-08-29 RX ADMIN — PHENYLEPHRINE HYDROCHLORIDE 1 DROP: 25 SOLUTION/ DROPS OPHTHALMIC at 06:58

## 2018-08-29 RX ADMIN — PROPARACAINE HYDROCHLORIDE 1 DROP: 5 SOLUTION/ DROPS OPHTHALMIC at 07:12

## 2018-08-29 RX ADMIN — DICLOFENAC SODIUM 1 DROP: 1 SOLUTION/ DROPS OPHTHALMIC at 07:04

## 2018-08-29 NOTE — DISCHARGE INSTRUCTIONS
Bisi Torres D.O., PJovanyCJovany  [facility].  [phone]    Post-Operative Instructions for Cataract Surgery     Remove your eye shield and bandage at 12 noon the same day as surgery and start your eye drops. THROW AWAY THE GAUZE UNDER THE SHIELD.  Place the blue eye shield back on for one week while asleep, even if napping in the recliner. Use the tape included in your bag.  DO NOT RUB YOUR EYE EVER! DO NOT WIPE YOUR EYE! ONLY WIPE ON YOUR CHEEK!                DO NOT WEAR EYE MAKEUP FOR 1 WEEK!  You may take a bath today and you can shower starting tomorrow.  You may resume your previous diet.  If you use glaucoma drops in the operative eye, continue them as directed.  Common symptoms after surgery include a scratchy feeling, slight headache, red eye, and/or blurred vision. You may use Advil or Tylenol for any discomfort.  Avoid strenuous activities and driving until you see Dr. Broderick Krueger tomorrow. Please use care when walking, your depth perception may be altered.  Bring your bag with your drops to your follow up appointment. Below are Instructions On How To Use Your Eye Drops. ON THE DAY OF SURGERY:     Use all three eye drops at 12 noon, 4pm, and 8pm.  Wait 10 minutes between drops. THE DAY AFTER SURGERY:     You will use the drops 4 times a day at 8am, 12 noon, 4pm, and 8pm.   Use the drops every day until bottles are empty. Vigamox (tan top) Put 1 drop in at 8am, 12 noon, 4pm, and 8pm.    Pred Acetate (pink top) Put 1 drop in at 8:10am, 12:10pm, 4:10pm, and 8:10pm. Shake before using. Ketorolac Put 1 drop in at 8:20am, 12:20pm, 4:20pm, 8:20pm.    CONTINUE DROPS UNTIL ALL BOTTLES ARE EMPTY! Follow-up appointment tomorrow at Dr. Best [place] office:___089 am_________________    Please call the office at [phone] if you experience severe pain or nausea.      The following personal items collected during your admission are returned to you:   Dental Appliance: Dental Appliances: None  Vision: Visual Aid: Glasses, At home  Hearing Aid: @FLOW  DISCHARGE SUMMARY from Nurse  (1341:LAST)@  Jewelry: Jewelry: None  Clothing:    Other Valuables: Other Valuables: None  Valuables sent to safe:        PATIENT INSTRUCTIONS:    After General Anesthesia or Intravenous Sedation, for 24 hours or while taking prescription Narcotics:        Someone should be with you for the next 24 hours. For your own safety, a responsible adult must drive you home. · Limit your activities  · Recommended activity: Rest today, up with assistance today. Do not climb stairs or shower unattended for the next 24 hours. · Please start with a soft bland diet and advance as tolerated (no nausea) to regular diet. · If you have a sore throat you should try the following: fluids, warm salt water gargles, or throat lozenges. If it does not improve after several days please follow up with your primary physician. · Do not drive and operate hazardous machinery  · Do not make important personal or business decisions  · Do  not drink alcoholic beverages  · If you have not urinated within 8 hours after discharge, please contact your surgeon on call. Report the following to your surgeon:  · Excessive pain, swelling, redness or odor of or around the surgical area  · Temperature over 100.5  · Any nausea or vomiting. · You will receive a Post Operative Call from one of the Recovery Room Nurses on the day after your surgery to check on you. It is very important for us to know how you are recovering after your surgery. If you have an issue or need to speak with someone, please call your surgeon, do not wait for the post operative call. · You may receive an e-mail or letter in the mail from [facility] regarding your experience with us in the Ambulatory Surgery Unit. Your feedback is valuable to us and we appreciate your participation in the survey.        · If the above instructions are not adequate or you are having problems after your surgery, call the physician at their office number. · We wish you a speedy recovery ? What to do at Home:      *  Please give a list of your current medications to your Primary Care Provider. *  Please update this list whenever your medications are discontinued, doses are      changed, or new medications (including over-the-counter products) are added. *  Please carry medication information at all times in case of emergency situations. These are general instructions for a healthy lifestyle:    No smoking/ No tobacco products/ Avoid exposure to second hand smoke    Surgeon General's Warning:  Quitting smoking now greatly reduces serious risk to your health. Obesity, smoking, and sedentary lifestyle greatly increases your risk for illness    A healthy diet, regular physical exercise & weight monitoring are important for maintaining a healthy lifestyle    You may be retaining fluid if you have a history of heart failure or if you experience any of the following symptoms:  Weight gain of 3 pounds or more overnight or 5 pounds in a week, increased swelling in our hands or feet or shortness of breath while lying flat in bed. Please call your doctor as soon as you notice any of these symptoms; do not wait until your next office visit. Recognize signs and symptoms of STROKE:    B - Balance  E - Eyes    F-  Face looks uneven    A-  Arms unable to move or move even    S-  Speech slurred or non-existent    T-  Time-call 911 as soon as signs and symptoms begin-DO NOT go       Back to bed or wait to see if you get better-TIME IS BRAIN. If you have not received your influenza and/or pneumococcal vaccine, please follow up with your primary care physician. The discharge information has been reviewed with the patient and family. The patient and family verbalized understanding.

## 2018-08-29 NOTE — ANESTHESIA PREPROCEDURE EVALUATION
Anesthetic History No history of anesthetic complications Review of Systems / Medical History Patient summary reviewed, nursing notes reviewed and pertinent labs reviewed Pulmonary Within defined limits Neuro/Psych Psychiatric history Cardiovascular Hypertension Hyperlipidemia Exercise tolerance: >4 METS Comments: Hx bradycardia GI/Hepatic/Renal 
  
GERD Renal disease: stones Endo/Other Diabetes Obesity Other Findings Comments: allergic rhinitis Hx motion sickness Incomplete rectal prolapse- asymptomatic Physical Exam 
 
Airway Mallampati: II 
TM Distance: 4 - 6 cm Neck ROM: normal range of motion Mouth opening: Normal 
 
 Cardiovascular Regular rate and rhythm,  S1 and S2 normal,  no murmur, click, rub, or gallop Dental 
 
Dentition: Bridges and Caps/crowns Pulmonary Breath sounds clear to auscultation Abdominal 
GI exam deferred Other Findings Anesthetic Plan ASA: 2 Anesthesia type: total IV anesthesia and MAC Induction: Intravenous Anesthetic plan and risks discussed with: Patient

## 2018-08-29 NOTE — OP NOTES
Name: Aaliyah Deluca MASC-4        updated 3/1/2013  Description:  Magno Chase with intraocular lens implant    PREOPERATIVE DIAGNOSIS: Visually impairing combined cataract, Right eye. POSTOPERATIVE DIAGNOSIS: Visually impairing combined   cataract,Right eye. OPERATION: Procedure(s):  CATARACT EXTRACTION WITH INTRA OCULAR LENS IMPLANT RIGHT EYE    ANESTHESIA: Topical with intravenous sedation    TYPE OF LENS IMPLANT USED:   Implant Name Type Inv. Item Serial No.  Lot No. LRB No. Used Action   LENS IOL POST 1-PC 6X13 22.0 -- [employer] - [id number]   LENS IOL POST 1-PC 6X13 22.0 -- [employer] [id number] [employer] NA Right 1 Implanted       PHACO TIME:   40 seconds at an average power of 11%. Estimated blood loss: None    Complications:None    Specimen removed: None    DESCRIPTION OF PROCEDURE:  The patient was brought to the holding area, where an IV was begun at the keep open rate. The patient received several instillations of Meño-Synephrine 2.5%, Cyclogyl 2%, and tetracaine 0.5% until adequate pupillary dilatation was achieved. The patient was connected to cardiovascular monitoring. Prior to being brought back to the operating suite, the patient received 2 drops of Betadine 5% solution into the inferior cul-de-sac of the operated eye. The patient was then brought back to the operating suite, and prepped and draped in the usual sterile manner after being re-connnected to cardiovascular monitoring. One drop of 4% Xylocaine was then instilled onto the eye. The lid speculum was set into position and the operating microscope was focused on the patient. Two drops of 4% Xylocaine were again instilled onto the eye. Using the fixation ring, the sharp 15-degree blade was used to create a paracentesis site and 1% MPF Xylocaine was instilled into the anterior chamber for anesthesia. Viscoelastic was then instilled into the anterior chamber.  The 2.4 mm keratome was then utilized to create a clear corneal incision, and a circular capsulorrhexis was performed. BSS was utilized to perform careful hydrodissection of the lens. Viscoelastic was then instilled into the anterior chamber to protect the corneal endothelium. Phacoemulsification was then carried out. Any remaining cortical material was removed in irrigation/aspiration mode. Viscoelastic was then instilled into the capsular bag. The lens implant was inspected for any defects. After finding none, the lens was placed in its injector and inserted into the capsular bag. The lens implant was centered on the patient's visual/astigmatic axis. The viscoelastic was then removed from the eye. Miochol was instilled posterior to the iris plane to facilitate pupillary miosis. The wound was checked for any leaks, after finding none, Iopidine and Pred Forte drops were instilled into the inferior cul-de-sac, and gentamicin ointment was placed over the cornea. A semi-pressure dressing and shield were then placed for the patient. The patient tolerated the procedure very well, and was brought to the recovery room in an alert and stable and satisfactory postoperative condition. Instructions were given to the patient and their family for their immediate postoperative care.   [address] [place] [address],   8/29/2018

## 2018-08-29 NOTE — PERIOP NOTES
Sandy Lopez 1950 
[id number] Situation: 
Verbal report given from: CISCO Murray cRNA and Augustine RN Procedure: Procedure(s): CATARACT EXTRACTION WITH INTRA OCULAR LENS IMPLANT RIGHT EYE Background: 
 
Preoperative diagnosis: Combined form of age-related cataract, right eye [H25.811] Postoperative diagnosis: Combined form of age-related cataract, right eye [H25.811] :  Dr. Broderick Krueger Assistant(s): Circ-1: Afsaneh Veloz RN Scrub Tech-1: [employer] Specimens: * No specimens in log * Assessment: 
Intra-procedure medications Anesthesia gave intra-procedure sedation and medications, see anesthesia flow sheet Intravenous fluids: Elizabeth [place] Vital signs stable Recommendation: 
 
Permission to share finding with  Ely Prader : yes

## 2018-08-29 NOTE — IP AVS SNAPSHOT
[address] [facility] 
[phone] Patient: Kaylee Garza MRN: [id number] GCP:9/23/4919 About your hospitalization You were admitted on:  August 29, 2018 You last received care in the:  [facility] [place] [facility] You were discharged on:  August 29, 2018 Why you were hospitalized Your primary diagnosis was:  Combined Forms Of Age-Related Cataract Of Right Eye Follow-up Information Follow up With Details Comments Contact Info Yakelin Aviles MD   [address] [address] 
[phone] Your Scheduled Appointments Wednesday August 29, 2018 CATARACT EXTRACTION WITH INTRA OCULAR LENS IMPLANT with Milon Tha, DO  
[facility] [place] AMB SURGERY UNIT (RI OR PRE ASSESSMENT) [address] [facility]  
[phone] Wednesday September 05, 2018 CATARACT EXTRACTION WITH INTRA OCULAR LENS IMPLANT with Milon Tha, DO  
[facility] [place] AMB SURGERY UNIT (RI OR PRE ASSESSMENT) [address] [facility]  
[phone] Discharge Orders None A check phoebe indicates which time of day the medication should be taken. My Medications ASK your doctor about these medications Instructions Each Dose to Equal  
 Morning Noon Evening Bedtime ALEVE 220 mg tablet Generic drug:  naproxen sodium Your last dose was: Your next dose is: Take 220-440 mg by mouth two (2) times daily (with meals). Prn  
 220-440 mg  
    
   
   
   
  
 aspirin delayed-release 81 mg tablet Your last dose was: Your next dose is: Take 81 mg by mouth daily. 81 mg  
    
   
   
   
  
 atenolol 50 mg tablet Commonly known as:  TENORMIN Your last dose was: Your next dose is: TAKE 1 TABLET BY MOUTH  DAILY  
     
   
   
   
  
 multivitamin tablet Commonly known as:  ONE A DAY  
   
 Your last dose was: Your next dose is: Take 1 Tab by mouth daily. 1 Tab PEPCID 20 mg tablet Generic drug:  famotidine Your last dose was: Your next dose is: Take 20 mg by mouth daily. 20 mg  
    
   
   
   
  
 pravastatin 20 mg tablet Commonly known as:  PRAVACHOL Your last dose was: Your next dose is: TAKE 1 TABLET BY MOUTH  NIGHTLY  
     
   
   
   
  
 prednisoLONE acetate 1 % ophthalmic suspension Commonly known as:  PRED FORTE Your last dose was: Your next dose is:    
   
   
      
   
   
   
  
 PROBIOTIC PO Your last dose was: Your next dose is: Take  by mouth daily. TYLENOL EXTRA STRENGTH 500 mg tablet Generic drug:  acetaminophen Your last dose was: Your next dose is: Take 2 Tabs by mouth every six (6) hours as needed for Pain. 1000 mg  
    
   
   
   
  
 VIGAMOX 0.5 % ophthalmic solution Generic drug:  moxifloxacin Your last dose was: Your next dose is:    
   
   
 Administer 1 Drop to right eye three (3) times daily. 1 Drop ZyrTEC 10 mg tablet Generic drug:  cetirizine Your last dose was: Your next dose is: Take 10 mg by mouth daily. 10 mg Discharge Instructions Juliana Calzada D.O., P.CJovany 
[facility]. 
[phone] Post-Operative Instructions for Cataract Surgery ? Remove your eye shield and bandage at 12 noon the same day as surgery and start your eye drops. THROW AWAY THE GAUZE UNDER THE SHIELD. ? Place the blue eye shield back on for one week while asleep, even if napping in the recliner. Use the tape included in your bag.   
       
 
? DO NOT RUB YOUR EYE EVER! DO NOT WIPE YOUR EYE! ONLY WIPE ON YOUR CHEEK! DO NOT WEAR EYE MAKEUP FOR 1 WEEK! 
 
 
? You may take a bath today and you can shower starting tomorrow. ? You may resume your previous diet. ? If you use glaucoma drops in the operative eye, continue them as directed. ? Common symptoms after surgery include a scratchy feeling, slight headache, red eye, and/or blurred vision. You may use Advil or Tylenol for any discomfort. ? Avoid strenuous activities and driving until you see Dr. Cleopatra Ramirez tomorrow. Please use care when walking, your depth perception may be altered. ? Bring your bag with your drops to your follow up appointment. Below are Instructions On How To Use Your Eye Drops. ON THE DAY OF SURGERY: 
 
? Use all three eye drops at 12 noon, 4pm, and 8pm.  Wait 10 minutes between drops. THE DAY AFTER SURGERY: 
 
? You will use the drops 4 times a day at 8am, 12 noon, 4pm, and 8pm. 
? Use the drops every day until bottles are empty. Vigamox (tan top) Put 1 drop in at 8am, 12 noon, 4pm, and 8pm. 
 
Pred Acetate (pink top) Put 1 drop in at 8:10am, 12:10pm, 4:10pm, and 8:10pm. Shake before using. Ketorolac Put 1 drop in at 8:20am, 12:20pm, 4:20pm, 8:20pm. 
 
CONTINUE DROPS UNTIL ALL BOTTLES ARE EMPTY! Follow-up appointment tomorrow at Dr. Chepe Cuellar office:___915 am_________________ Please call the office at [phone] if you experience severe pain or nausea. The following personal items collected during your admission are returned to you:  
Dental Appliance: Dental Appliances: None Vision: Visual Aid: Glasses, At home Hearing Aid: @FLOW DISCHARGE SUMMARY from Nurse 
(1341:LAST)@ Jewelry: Jewelry: None Clothing:   
Other Valuables: Other Valuables: None Valuables sent to safe:   
 
 
PATIENT INSTRUCTIONS: 
 
After General Anesthesia or Intravenous Sedation, for 24 hours or while taking prescription Narcotics: 
      Someone should be with you for the next 24 hours. For your own safety, a responsible adult must drive you home. · Limit your activities · Recommended activity: Rest today, up with assistance today. Do not climb stairs or shower unattended for the next 24 hours. · Please start with a soft bland diet and advance as tolerated (no nausea) to regular diet. · If you have a sore throat you should try the following: fluids, warm salt water gargles, or throat lozenges. If it does not improve after several days please follow up with your primary physician. · Do not drive and operate hazardous machinery · Do not make important personal or business decisions · Do  not drink alcoholic beverages · If you have not urinated within 8 hours after discharge, please contact your surgeon on call. Report the following to your surgeon: 
· Excessive pain, swelling, redness or odor of or around the surgical area · Temperature over 100.5 · Any nausea or vomiting. · You will receive a Post Operative Call from one of the Recovery Room Nurses on the day after your surgery to check on you. It is very important for us to know how you are recovering after your surgery. If you have an issue or need to speak with someone, please call your surgeon, do not wait for the post operative call. · You may receive an e-mail or letter in the mail from [employer] regarding your experience with us in the Ambulatory Surgery Unit. Your feedback is valuable to us and we appreciate your participation in the survey. · If the above instructions are not adequate or you are having problems after your surgery, call the physician at their office number. · We wish you a speedy recovery ? What to do at Home: *  Please give a list of your current medications to your Primary Care Provider. *  Please update this list whenever your medications are discontinued, doses are 
    changed, or new medications (including over-the-counter products) are added. *  Please carry medication information at all times in case of emergency situations. These are general instructions for a healthy lifestyle: No smoking/ No tobacco products/ Avoid exposure to second hand smoke Surgeon General's Warning:  Quitting smoking now greatly reduces serious risk to your health. Obesity, smoking, and sedentary lifestyle greatly increases your risk for illness A healthy diet, regular physical exercise & weight monitoring are important for maintaining a healthy lifestyle You may be retaining fluid if you have a history of heart failure or if you experience any of the following symptoms:  Weight gain of 3 pounds or more overnight or 5 pounds in a week, increased swelling in our hands or feet or shortness of breath while lying flat in bed. Please call your doctor as soon as you notice any of these symptoms; do not wait until your next office visit. Recognize signs and symptoms of STROKE: 
 
B - Balance E - Eyes F-  Face looks uneven A-  Arms unable to move or move even S-  Speech slurred or non-existent T-  Time-call 911 as soon as signs and symptoms begin-DO NOT go Back to bed or wait to see if you get better-TIME IS BRAIN. If you have not received your influenza and/or pneumococcal vaccine, please follow up with your primary care physician. The discharge information has been reviewed with the patient and family. The patient and family verbalized understanding. ACO Transitions of Care Introducing [employer] Tonya Chirinos offers a voluntary care coordination program to provide high quality service and care to [facility] fee-for-service beneficiaries. [facility] was designed to help you enhance your health and well-being through the following services: ? Transitions of Care  support for individuals who are transitioning from one care setting to another (example: Hospital to home). ? Chronic and Complex Care Coordination  support for individuals and caregivers of those with serious or chronic illnesses or with more than one chronic (ongoing) condition and those who take a number of different medications. If you meet specific medical criteria, a [facility] [address] may call you directly to coordinate your care with your primary care physician and your other care providers. For questions about the [facility] programs, please, contact your physicians office. For general questions or additional information about Accountable Care Organizations: 
Please visit www.medicare.gov/acos. html or call 1-800-MEDICARE (0-709.545.2838) TTY users should call 3-273.623.4958. Introducing \[facility]\"" & HEALTH SERVICES! Dear Jen Arguello: Thank you for requesting a [employer] account. Our records indicate that you already have an active [employer] account. You can access your account anytime at [email or web address] Experience/"[employer]" Did you know that you can access your hospital and ER discharge instructions at any time in [employer]? You can also review all of your test results from your hospital stay or ER visit. Additional Information If you have questions, please visit the Frequently Asked Questions section of the [employer] website at https://"[email or web address]/"[employer]"/. Remember, [employer] is NOT to be used for urgent needs. For medical emergencies, dial 911. Now available from your iPhone and Android! Introducing Deni Newman As a [employer] patient, I wanted to make you aware of our electronic visit tool called Deni Nahidyuki. [employer] 24/7 allows you to connect within minutes with a medical provider 24 hours a day, seven days a week via a mobile device or tablet or logging into a secure website from your computer. You can access Deni Whitfieldasmitafin from anywhere in the [place]. A virtual visit might be right for you when you have a simple condition and feel like you just dont want to get out of bed, or cant get away from work for an appointment, when your regular [facility] provider is not available (evenings, weekends or holidays), or when youre out of town and need minor care. Electronic visits cost only $49 and if the [facility] 24/7 provider determines a prescription is needed to treat your condition, one can be electronically transmitted to a nearby pharmacy*. Please take a moment to enroll today if you have not already done so. The enrollment process is free and takes just a few minutes. To enroll, please download the [employer] 24/ItrybeforeIbuy ciarra to your tablet or phone, or visit [email or web address] to enroll on your computer. And, as an [address] [place] [address] patient with a [facility] account, the results of your visits will be scanned into your electronic medical record and your primary care provider will be able to view the scanned results. We urge you to continue to see your regular [facility] provider for your ongoing medical care. And while your primary care provider may not be the one available when you seek a Deni Newman virtual visit, the peace of mind you get from getting a real diagnosis real time can be priceless. For more information on Deni Newman, view our Frequently Asked Questions (FAQs) at [email or web address]. Sincerely, 
 
Charline Ren MD 
Chief Medical Officer Nestor Chirinos *:  certain medications cannot be prescribed via Deni Newman Providers Seen During Your Hospitalization Provider Specialty Primary office phone Ish Cuevas DO Ophthalmology [phone] Your Primary Care Physician (PCP) Primary Care Physician Office Phone Office Fax Torrie Haysoms [phone] You are allergic to the following Allergen Reactions Doxycycline Rash Sulfa (Sulfonamide Antibiotics) Rash Tetracycline Rash Recent Documentation Height Weight BMI OB Status Smoking Status 1.664 m 93.4 kg 33.76 kg/m2 Postmenopausal Never Smoker Emergency Contacts Name Discharge Info Relation Home Work Mobile Rafa Lopez DISCHARGE CAREGIVER [3] Spouse [3] [phone] Patient Belongings The following personal items are in your possession at time of discharge: 
  Dental Appliances: None  Visual Aid: Glasses, At home      Home Medications: None   Jewelry: None       Other Valuables: None Please provide this summary of care documentation to your next provider. Signatures-by signing, you are acknowledging that this After Visit Summary has been reviewed with you and you have received a copy. Patient Signature:  ____________________________________________________________ Date:  ____________________________________________________________  
  
Oumou Rutledge Provider Signature:  ____________________________________________________________ Date:  ____________________________________________________________

## 2018-08-29 NOTE — ANESTHESIA POSTPROCEDURE EVALUATION
Post-Anesthesia Evaluation and Assessment Patient: Sharyle Maidens MRN: [id number]  SSN: [id number] YOB: 1950  Age: 75 y.o. Sex: female Cardiovascular Function/Vital Signs Visit Vitals  /65 (BP 1 Location: Left arm, BP Patient Position: At rest)  Pulse (!) 51  Temp 36.4 °C (97.6 °F)  Resp 14  
 Ht 5' 5.5\" (1.664 m)  Wt 93.4 kg (206 lb)  SpO2 97%  BMI 33.76 kg/m2 Patient is status post total IV anesthesia, MAC anesthesia for Procedure(s): CATARACT EXTRACTION WITH INTRA OCULAR LENS IMPLANT RIGHT EYE. Nausea/Vomiting: None Postoperative hydration reviewed and adequate. Pain: 
Pain Scale 1: Numeric (0 - 10) (08/29/18 0817) Pain Intensity 1: 0 (08/29/18 0817) Managed Neurological Status:  
Neuro (WDL): Exceptions to WDL (08/29/18 0817) Neuro Neurologic State: Alert (08/29/18 6343) LUE Motor Response: Purposeful (08/29/18 0817) LLE Motor Response: Purposeful (08/29/18 0817) RUE Motor Response: Purposeful (08/29/18 0817) RLE Motor Response: Purposeful (08/29/18 0817) At baseline Mental Status and Level of Consciousness: Arousable Pulmonary Status:  
O2 Device: Room air (08/29/18 0817) Adequate oxygenation and airway patent Complications related to anesthesia: None Post-anesthesia assessment completed. No concerns Signed By: Rowena Obrien MD   
 August 29, 2018

## 2018-08-29 NOTE — PERIOP NOTES
Kwabena Esparza 1950 
[id number] Situation: 
Verbal report given from: Ada Valencia RN and CISCO Murray CRNA Procedure: Procedure(s): CATARACT EXTRACTION WITH INTRA OCULAR LENS IMPLANT RIGHT EYE Background: 
 
Preoperative diagnosis: Combined form of age-related cataract, right eye [H25.811] Postoperative diagnosis: Combined form of age-related cataract, right eye [H25.811] :  Dr. Fatoumata Mcginnis Assistant(s): Circ-1: Roxi Gil RN Scrub Tech-1: [employer] Specimens: * No specimens in log * Assessment: 
Intra-procedure medications Anesthesia gave intra-procedure sedation and medications, see anesthesia flow sheet Intravenous fluids: Kim Fani Vital signs stable Recommendation: 
 
Permission to share finding with  Zuleika Lank : yes

## 2018-08-29 NOTE — PERIOP NOTES
Permission received to review discharge instructions and discuss private health information with Елена Esparza ().

## 2018-08-29 NOTE — PERIOP NOTES
to PACU to review d/c instructions 5600 Discharged to home via/wc,accompanied to car per RN. Skin warm and dry, awake and alert. Respirations even, unlabored. Pt and family members questions and concerns addressed prior to discharge. All belongings with pt.

## 2018-09-04 ENCOUNTER — ANESTHESIA EVENT (OUTPATIENT)
Dept: SURGERY | Age: 68
End: 2018-09-04
Payer: MEDICARE

## 2018-09-04 PROBLEM — H25.812 COMBINED FORMS OF AGE-RELATED CATARACT OF LEFT EYE: Status: ACTIVE | Noted: 2018-09-04

## 2018-09-04 RX ORDER — DICLOFENAC SODIUM 1 MG/ML
1 SOLUTION/ DROPS OPHTHALMIC 4 TIMES DAILY
COMMUNITY
End: 2019-11-24 | Stop reason: ALTCHOICE

## 2018-09-04 NOTE — PERIOP NOTES
[facility] Ambulatory Surgery Unit Pre-operative Instructions Surgery/Procedure Date  Wed, Sept 5, 2018            Tentative Arrival Time unable to give 1. On the day of your surgery/procedure, please report to the Ambulatory Surgery Unit Registration Desk and sign in at your designated time. The Ambulatory Surgery Unit is located in [facility] on the [facility] side of the [facility] [place] across from the [address] [place] [address]. Please have all of your health insurance cards and a photo ID. 2. You must have someone with you to drive you home, as you should not drive a car for 24 hours following anesthesia. Please make arrangements for a responsible adult friend or family member to stay with you for at least the first 24 hours after your surgery. 3. Do not have anything to eat or drink (including water, gum, mints, coffee, juice) after midnight, Tuesday. This may not apply to medications prescribed by your physician. (Please note below the special instructions with medications to take the morning of surgery, if applicable.) 4. We recommend you do not drink any alcoholic beverages for 24 hours before and after your surgery. 5. Contact your surgeons office for instructions on the following medications: non-steroidal anti-inflammatory drugs (i.e. Advil, Aleve), vitamins, and supplements. (Some surgeons will want you to stop these medications prior to surgery and others may allow you to take them) **If you are currently taking Plavix, Coumadin, Aspirin and/or other blood-thinning agents, contact your surgeon for instructions. ** Your surgeon will partner with the physician prescribing these medications to determine if it is safe to stop or if you need to continue taking. Please do not stop taking these medications without instructions from your surgeon.  
 
6. In an effort to help prevent surgical site infection, we ask that you shower with an anti-bacterial soap (i.e. Dial or Safeguard) for 3 days prior to and on the morning of surgery, using a fresh towel after each shower. (Please begin this process with fresh bed linens.) Do not apply any lotions, powders, or deodorants after the shower on the day of your procedure. If applicable, please do not shave the operative site for 48 hours prior to surgery. 7. Wear comfortable clothes. Wear glasses instead of contacts. Do not bring any jewelry or money (other than copays or fees as instructed). Do not wear make-up, particularly mascara, the morning of your surgery. Do not wear nail polish, particularly if you are having foot /hand surgery. Wear your hair loose or down, no ponytails, buns, bryant pins or clips. All body piercings must be removed. 8. You should understand that if you do not follow these instructions your surgery may be cancelled. If your physical condition changes (i.e. fever, cold or flu) please contact your surgeon as soon as possible. 9. It is important that you be on time. If a situation occurs where you may be late, or if you have any questions or problems, please call [phone]. 
 
10. Your surgery time may be subject to change. You will receive a phone call the day prior to surgery to confirm your arrival time. 11. Pediatric patients: please bring a change of clothes, diapers, bottle/sippy cup, pacifier, etc. 
 
 
Special Instructions: Take all medications and inhalers, as prescribed, on the morning of surgery with a sip of water EXCEPT: no over the counter vitamins day of surgery I understand a pre-operative phone call will be made to verify my surgery time. In the event that I am not available, I give permission for a message to be left on my answering service and/or with another person? yes Preop instructions reviewed  Pt verbalized understanding.  
 
 ___________________      ___________________      ________________ (Signature of Patient)          (Witness)                   (Date and Time)

## 2018-09-05 ENCOUNTER — ANESTHESIA (OUTPATIENT)
Dept: SURGERY | Age: 68
End: 2018-09-05
Payer: MEDICARE

## 2018-09-05 ENCOUNTER — HOSPITAL ENCOUNTER (OUTPATIENT)
Age: 68
Setting detail: OUTPATIENT SURGERY
Discharge: HOME | End: 2018-09-05
Attending: OPHTHALMOLOGY | Admitting: OPHTHALMOLOGY
Payer: MEDICARE

## 2018-09-05 VITALS
SYSTOLIC BLOOD PRESSURE: 139 MMHG | TEMPERATURE: 97.7 F | BODY MASS INDEX: 33.43 KG/M2 | RESPIRATION RATE: 15 BRPM | HEART RATE: 55 BPM | DIASTOLIC BLOOD PRESSURE: 66 MMHG | OXYGEN SATURATION: 97 % | HEIGHT: 66 IN | WEIGHT: 208 LBS

## 2018-09-05 PROCEDURE — 74011000250 HC RX REV CODE- 250

## 2018-09-05 PROCEDURE — 76030000002 HC AMB SURG OR TIME FIRST 0.: Performed by: OPHTHALMOLOGY

## 2018-09-05 PROCEDURE — V2632 POST CHMBR INTRAOCULAR LENS: HCPCS | Performed by: OPHTHALMOLOGY

## 2018-09-05 PROCEDURE — 74011250636 HC RX REV CODE- 250/636: Performed by: OPHTHALMOLOGY

## 2018-09-05 PROCEDURE — 77030021352 HC CBL LD SYS DISP COVD -B: Performed by: OPHTHALMOLOGY

## 2018-09-05 PROCEDURE — 76060000073 HC AMB SURG ANES FIRST 0.5 HR: Performed by: OPHTHALMOLOGY

## 2018-09-05 PROCEDURE — 77030018846 HC SOL IRR STRL H20 ICUM -A: Performed by: OPHTHALMOLOGY

## 2018-09-05 PROCEDURE — 74011250637 HC RX REV CODE- 250/637: Performed by: OPHTHALMOLOGY

## 2018-09-05 PROCEDURE — 74011000250 HC RX REV CODE- 250: Performed by: OPHTHALMOLOGY

## 2018-09-05 PROCEDURE — 74011250636 HC RX REV CODE- 250/636

## 2018-09-05 PROCEDURE — 76210000046 HC AMBSU PH II REC FIRST 0.5 HR: Performed by: OPHTHALMOLOGY

## 2018-09-05 DEVICE — LENS IOL POST 1-PC 6X13 23.5 -- ACRYSOF: Type: IMPLANTABLE DEVICE | Site: EYE | Status: FUNCTIONAL

## 2018-09-05 RX ORDER — MOXIFLOXACIN 5 MG/ML
1 SOLUTION/ DROPS OPHTHALMIC 3 TIMES DAILY
COMMUNITY
End: 2019-11-24 | Stop reason: ALTCHOICE

## 2018-09-05 RX ORDER — SODIUM CHLORIDE 0.9 % (FLUSH) 0.9 %
5-10 SYRINGE (ML) INJECTION AS NEEDED
Status: DISCONTINUED | OUTPATIENT
Start: 2018-09-05 | End: 2018-09-05 | Stop reason: HOSPADM

## 2018-09-05 RX ORDER — SODIUM CHLORIDE 0.9 % (FLUSH) 0.9 %
5-10 SYRINGE (ML) INJECTION EVERY 8 HOURS
Status: DISCONTINUED | OUTPATIENT
Start: 2018-09-05 | End: 2018-09-05 | Stop reason: HOSPADM

## 2018-09-05 RX ORDER — SODIUM CHLORIDE 9 MG/ML
25 INJECTION, SOLUTION INTRAVENOUS CONTINUOUS
Status: DISCONTINUED | OUTPATIENT
Start: 2018-09-05 | End: 2018-09-05 | Stop reason: HOSPADM

## 2018-09-05 RX ORDER — LIDOCAINE HYDROCHLORIDE 10 MG/ML
1 INJECTION, SOLUTION EPIDURAL; INFILTRATION; INTRACAUDAL; PERINEURAL ONCE
Status: COMPLETED | OUTPATIENT
Start: 2018-09-05 | End: 2018-09-05

## 2018-09-05 RX ORDER — ERYTHROMYCIN 5 MG/G
OINTMENT OPHTHALMIC
Status: COMPLETED | OUTPATIENT
Start: 2018-09-05 | End: 2018-09-05

## 2018-09-05 RX ORDER — PROPARACAINE HYDROCHLORIDE 5 MG/ML
1 SOLUTION/ DROPS OPHTHALMIC
Status: COMPLETED | OUTPATIENT
Start: 2018-09-05 | End: 2018-09-05

## 2018-09-05 RX ORDER — PREDNISOLONE ACETATE 10 MG/ML
1 SUSPENSION/ DROPS OPHTHALMIC 2 TIMES DAILY
Status: DISCONTINUED | OUTPATIENT
Start: 2018-09-05 | End: 2018-09-05 | Stop reason: HOSPADM

## 2018-09-05 RX ORDER — DIPHENHYDRAMINE HYDROCHLORIDE 50 MG/ML
12.5 INJECTION, SOLUTION INTRAMUSCULAR; INTRAVENOUS AS NEEDED
Status: DISCONTINUED | OUTPATIENT
Start: 2018-09-05 | End: 2018-09-05 | Stop reason: HOSPADM

## 2018-09-05 RX ORDER — FENTANYL CITRATE 50 UG/ML
25 INJECTION, SOLUTION INTRAMUSCULAR; INTRAVENOUS
Status: DISCONTINUED | OUTPATIENT
Start: 2018-09-05 | End: 2018-09-05 | Stop reason: HOSPADM

## 2018-09-05 RX ORDER — CYCLOPENTOLATE HYDROCHLORIDE 20 MG/ML
1 SOLUTION/ DROPS OPHTHALMIC
Status: COMPLETED | OUTPATIENT
Start: 2018-09-05 | End: 2018-09-05

## 2018-09-05 RX ORDER — DICLOFENAC SODIUM 1 MG/ML
1 SOLUTION/ DROPS OPHTHALMIC
Status: COMPLETED | OUTPATIENT
Start: 2018-09-05 | End: 2018-09-05

## 2018-09-05 RX ORDER — LIDOCAINE HYDROCHLORIDE 10 MG/ML
0.1 INJECTION, SOLUTION EPIDURAL; INFILTRATION; INTRACAUDAL; PERINEURAL AS NEEDED
Status: DISCONTINUED | OUTPATIENT
Start: 2018-09-05 | End: 2018-09-05 | Stop reason: HOSPADM

## 2018-09-05 RX ORDER — SODIUM CHLORIDE, SODIUM LACTATE, POTASSIUM CHLORIDE, CALCIUM CHLORIDE 600; 310; 30; 20 MG/100ML; MG/100ML; MG/100ML; MG/100ML
25 INJECTION, SOLUTION INTRAVENOUS CONTINUOUS
Status: DISCONTINUED | OUTPATIENT
Start: 2018-09-05 | End: 2018-09-05 | Stop reason: HOSPADM

## 2018-09-05 RX ORDER — PHENYLEPHRINE HYDROCHLORIDE 25 MG/ML
1 SOLUTION/ DROPS OPHTHALMIC
Status: COMPLETED | OUTPATIENT
Start: 2018-09-05 | End: 2018-09-05

## 2018-09-05 RX ORDER — DICLOFENAC SODIUM 1 MG/ML
SOLUTION/ DROPS OPHTHALMIC
Status: COMPLETED
Start: 2018-09-05 | End: 2018-09-05

## 2018-09-05 RX ORDER — MIDAZOLAM HYDROCHLORIDE 1 MG/ML
INJECTION, SOLUTION INTRAMUSCULAR; INTRAVENOUS AS NEEDED
Status: DISCONTINUED | OUTPATIENT
Start: 2018-09-05 | End: 2018-09-05 | Stop reason: HOSPADM

## 2018-09-05 RX ORDER — ONDANSETRON 2 MG/ML
4 INJECTION INTRAMUSCULAR; INTRAVENOUS AS NEEDED
Status: DISCONTINUED | OUTPATIENT
Start: 2018-09-05 | End: 2018-09-05 | Stop reason: HOSPADM

## 2018-09-05 RX ORDER — LIDOCAINE HYDROCHLORIDE 20 MG/ML
5 INJECTION, SOLUTION EPIDURAL; INFILTRATION; INTRACAUDAL; PERINEURAL ONCE
Status: COMPLETED | OUTPATIENT
Start: 2018-09-05 | End: 2018-09-05

## 2018-09-05 RX ADMIN — PHENYLEPHRINE HYDROCHLORIDE 1 DROP: 25 SOLUTION/ DROPS OPHTHALMIC at 06:40

## 2018-09-05 RX ADMIN — MIDAZOLAM HYDROCHLORIDE 1 MG: 1 INJECTION, SOLUTION INTRAMUSCULAR; INTRAVENOUS at 07:38

## 2018-09-05 RX ADMIN — MIDAZOLAM HYDROCHLORIDE 1 MG: 1 INJECTION, SOLUTION INTRAMUSCULAR; INTRAVENOUS at 07:34

## 2018-09-05 RX ADMIN — PROPARACAINE HYDROCHLORIDE 1 DROP: 5 SOLUTION/ DROPS OPHTHALMIC at 06:40

## 2018-09-05 RX ADMIN — DICLOFENAC SODIUM 1 DROP: 1 SOLUTION/ DROPS OPHTHALMIC at 06:41

## 2018-09-05 RX ADMIN — PROPARACAINE HYDROCHLORIDE 1 DROP: 5 SOLUTION/ DROPS OPHTHALMIC at 06:47

## 2018-09-05 RX ADMIN — PROPARACAINE HYDROCHLORIDE 1 DROP: 5 SOLUTION/ DROPS OPHTHALMIC at 06:35

## 2018-09-05 RX ADMIN — CYCLOPENTOLATE HYDROCHLORIDE 1 DROP: 20 SOLUTION/ DROPS OPHTHALMIC at 06:35

## 2018-09-05 RX ADMIN — CYCLOPENTOLATE HYDROCHLORIDE 1 DROP: 20 SOLUTION/ DROPS OPHTHALMIC at 06:47

## 2018-09-05 RX ADMIN — SODIUM CHLORIDE 25 ML/HR: 900 INJECTION, SOLUTION INTRAVENOUS at 06:37

## 2018-09-05 RX ADMIN — PHENYLEPHRINE HYDROCHLORIDE 1 DROP: 25 SOLUTION/ DROPS OPHTHALMIC at 06:47

## 2018-09-05 RX ADMIN — PROPARACAINE HYDROCHLORIDE 1 DROP: 5 SOLUTION/ DROPS OPHTHALMIC at 07:15

## 2018-09-05 RX ADMIN — PHENYLEPHRINE HYDROCHLORIDE 1 DROP: 25 SOLUTION/ DROPS OPHTHALMIC at 06:35

## 2018-09-05 RX ADMIN — DICLOFENAC SODIUM 1 DROP: 1 SOLUTION/ DROPS OPHTHALMIC at 06:36

## 2018-09-05 RX ADMIN — DICLOFENAC SODIUM 1 DROP: 1 SOLUTION/ DROPS OPHTHALMIC at 06:56

## 2018-09-05 RX ADMIN — DICLOFENAC SODIUM 1 DROP: 1 SOLUTION/ DROPS OPHTHALMIC at 06:47

## 2018-09-05 RX ADMIN — DICLOFENAC SODIUM 1 DROP: 1 SOLUTION OPHTHALMIC at 06:36

## 2018-09-05 RX ADMIN — PROPARACAINE HYDROCHLORIDE 1 DROP: 5 SOLUTION/ DROPS OPHTHALMIC at 06:56

## 2018-09-05 RX ADMIN — PHENYLEPHRINE HYDROCHLORIDE 1 DROP: 25 SOLUTION/ DROPS OPHTHALMIC at 06:56

## 2018-09-05 RX ADMIN — CYCLOPENTOLATE HYDROCHLORIDE 1 DROP: 20 SOLUTION/ DROPS OPHTHALMIC at 06:40

## 2018-09-05 RX ADMIN — CYCLOPENTOLATE HYDROCHLORIDE 1 DROP: 20 SOLUTION/ DROPS OPHTHALMIC at 06:56

## 2018-09-05 NOTE — PERIOP NOTES
Kesha Kirk 1950 
[id number] Situation: 
Verbal report given from: Marquis Santiago and Ash James Procedure: Procedure(s): LEFT EYE FIRST CATARACT EXTRACTION WITH INTRA OCULAR LENS IMPLANT Background: 
 
Preoperative diagnosis: Combined form of age-related cataract, left eye [H25.812] Postoperative diagnosis: Combined form of age-related cataract, left eye [H25.812] :  Dr. Nata oGrdon Assistant(s): Circ-1: Vance Torres RN Scrub Tech-1: [employer] Specimens: * No specimens in log * Assessment: 
Intra-procedure medications Anesthesia gave intra-procedure sedation and medications, see anesthesia flow sheet Intravenous fluids: Jenna Shayla Vital signs stable Recommendation: 
 
Permission to share finding with  Paloma Clark

## 2018-09-05 NOTE — OP NOTES
Name: Cleo Rodrigues [facility] [place]        updated 3/1/2013  Description:  Brian Pendleton with intraocular lens implant    PREOPERATIVE DIAGNOSIS: Visually impairing combined cataract, Left eye. POSTOPERATIVE DIAGNOSIS: Visually impairing combined   cataract,Left eye. OPERATION: Procedure(s):  LEFT EYE FIRST CATARACT EXTRACTION WITH INTRA OCULAR LENS IMPLANT     ANESTHESIA: Topical with intravenous sedation    TYPE OF LENS IMPLANT USED:   Implant Name Type Inv. Item Serial No.  Lot No. LRB No. Used Action   LENS IOL POST 1-PC 6X13 23.5 -- [employer] - [id number]   LENS IOL POST 1-PC 6X13 23.5 -- [employer] [id number] [employer] NA Left 1 Implanted       PHACO TIME:   40.8 seconds at an average power of 12.4%. Estimated blood loss: None    Complications:None    Specimen removed: None    DESCRIPTION OF PROCEDURE:  The patient was brought to the holding area, where an IV was begun at the keep open rate. The patient received several instillations of Meño-Synephrine 2.5%, Cyclogyl 2%, and tetracaine 0.5% until adequate pupillary dilatation was achieved. The patient was connected to cardiovascular monitoring. Prior to being brought back to the operating suite, the patient received 2 drops of Betadine 5% solution into the inferior cul-de-sac of the operated eye. The patient was then brought back to the operating suite, and prepped and draped in the usual sterile manner after being re-connnected to cardiovascular monitoring. One drop of 4% Xylocaine was then instilled onto the eye. The lid speculum was set into position and the operating microscope was focused on the patient. Two drops of 4% Xylocaine were again instilled onto the eye. Using the fixation ring, the sharp 15-degree blade was used to create a paracentesis site and 1% MPF Xylocaine was instilled into the anterior chamber for anesthesia. Viscoelastic was then instilled into the anterior chamber.  The 2.4 mm keratome was then utilized to create a clear corneal incision, and a circular capsulorrhexis was performed. BSS was utilized to perform careful hydrodissection of the lens. Viscoelastic was then instilled into the anterior chamber to protect the corneal endothelium. Phacoemulsification was then carried out. Any remaining cortical material was removed in irrigation/aspiration mode. Viscoelastic was then instilled into the capsular bag. The lens implant was inspected for any defects. After finding none, the lens was placed in its injector and inserted into the capsular bag. The lens implant was centered on the patient's visual/astigmatic axis. The viscoelastic was then removed from the eye. Miochol was instilled posterior to the iris plane to facilitate pupillary miosis. The wound was checked for any leaks, after finding none, Iopidine and Pred Forte drops were instilled into the inferior cul-de-sac, and gentamicin ointment was placed over the cornea. A semi-pressure dressing and shield were then placed for the patient. The patient tolerated the procedure very well, and was brought to the recovery room in an alert and stable and satisfactory postoperative condition. Instructions were given to the patient and their family for their immediate postoperative care.   [address] [place] [address],   9/5/2018

## 2018-09-05 NOTE — DISCHARGE INSTRUCTIONS
Collin Jonas D.O., P.CJovany  [facility] 183.  [phone]    Post-Operative Instructions for Cataract Surgery     Remove your eye shield and bandage at 12 noon the same day as surgery and start your eye drops. THROW AWAY THE GAUZE UNDER THE SHIELD.  Place the blue eye shield back on for one week while asleep, even if napping in the recliner. Use the tape included in your bag.  DO NOT RUB YOUR EYE EVER! DO NOT WIPE YOUR EYE! ONLY WIPE ON YOUR CHEEK!                DO NOT WEAR EYE MAKEUP FOR 1 WEEK!  You may take a bath today and you can shower starting tomorrow.  You may resume your previous diet.  If you use glaucoma drops in the operative eye, continue them as directed.  Common symptoms after surgery include a scratchy feeling, slight headache, red eye, and/or blurred vision. You may use Advil or Tylenol for any discomfort.  Avoid strenuous activities and driving until you see Dr. Randy Parekh tomorrow. Please use care when walking, your depth perception may be altered.  Bring your bag with your drops to your follow up appointment. Below are Instructions On How To Use Your Eye Drops. ON THE DAY OF SURGERY:     Use all three eye drops at 12 noon, 4pm, and 8pm.  Wait 10 minutes between drops. THE DAY AFTER SURGERY:     You will use the drops 4 times a day at 8am, 12 noon, 4pm, and 8pm.   Use the drops every day until bottles are empty. Vigamox (tan top) Put 1 drop in at 8am, 12 noon, 4pm, and 8pm.    Pred Acetate (pink top) Put 1 drop in at 8:10am, 12:10pm, 4:10pm, and 8:10pm. Shake before using. Ketorolac Put 1 drop in at 8:20am, 12:20pm, 4:20pm, 8:20pm.    CONTINUE DROPS UNTIL ALL BOTTLES ARE EMPTY! Follow-up appointment tomorrow at Dr. Mirlande Renae office:_____9:15 a.m._______________    Please call the office at [phone] if you experience severe pain or nausea.      The following personal items collected during your admission are returned to you:   Dental Appliance: Dental Appliances: None  Vision:    Hearing Aid: @FLOW  DISCHARGE SUMMARY from Nurse  (1341:LAST)@  Jewelry: Jewelry: None  Clothing: Clothing: None  Other Valuables: Other Valuables: None  Valuables sent to safe:        PATIENT INSTRUCTIONS:    After General Anesthesia or Intravenous Sedation, for 24 hours or while taking prescription Narcotics:        Someone should be with you for the next 24 hours. For your own safety, a responsible adult must drive you home. · Limit your activities  · Recommended activity: Rest today, up with assistance today. Do not climb stairs or shower unattended for the next 24 hours. · Please start with a soft bland diet and advance as tolerated (no nausea) to regular diet. · If you have a sore throat you should try the following: fluids, warm salt water gargles, or throat lozenges. If it does not improve after several days please follow up with your primary physician. · Do not drive and operate hazardous machinery  · Do not make important personal or business decisions  · Do  not drink alcoholic beverages  · If you have not urinated within 8 hours after discharge, please contact your surgeon on call. Report the following to your surgeon:  · Excessive pain, swelling, redness or odor of or around the surgical area  · Temperature over 100.5  · Any nausea or vomiting. · You will receive a Post Operative Call from one of the Recovery Room Nurses on the day after your surgery to check on you. It is very important for us to know how you are recovering after your surgery. If you have an issue or need to speak with someone, please call your surgeon, do not wait for the post operative call. · You may receive an e-mail or letter in the mail from [facility] regarding your experience with us in the Ambulatory Surgery Unit. Your feedback is valuable to us and we appreciate your participation in the survey.        · If the above instructions are not adequate or you are having problems after your surgery, call the physician at their office number. · We wish you a speedy recovery ? What to do at Home:      *  Please give a list of your current medications to your Primary Care Provider. *  Please update this list whenever your medications are discontinued, doses are      changed, or new medications (including over-the-counter products) are added. *  Please carry medication information at all times in case of emergency situations. These are general instructions for a healthy lifestyle:    No smoking/ No tobacco products/ Avoid exposure to second hand smoke    Surgeon General's Warning:  Quitting smoking now greatly reduces serious risk to your health. Obesity, smoking, and sedentary lifestyle greatly increases your risk for illness    A healthy diet, regular physical exercise & weight monitoring are important for maintaining a healthy lifestyle    You may be retaining fluid if you have a history of heart failure or if you experience any of the following symptoms:  Weight gain of 3 pounds or more overnight or 5 pounds in a week, increased swelling in our hands or feet or shortness of breath while lying flat in bed. Please call your doctor as soon as you notice any of these symptoms; do not wait until your next office visit. Recognize signs and symptoms of STROKE:    B - Balance  E - Eyes    F-  Face looks uneven    A-  Arms unable to move or move even    S-  Speech slurred or non-existent    T-  Time-call 911 as soon as signs and symptoms begin-DO NOT go       Back to bed or wait to see if you get better-TIME IS BRAIN. If you have not received your influenza and/or pneumococcal vaccine, please follow up with your primary care physician. The discharge information has been reviewed with the patient and family. The patient and family verbalized understanding.

## 2018-09-05 NOTE — IP AVS SNAPSHOT
[address] [facility] 
[phone] Patient: Penny Doan MRN: [id number] THS:1[birth year]32[birth year]5443 About your hospitalization You were admitted on:  September 5, 2018 You last received care in the:  [facility] SURGERY UNIT You were discharged on:  September 5, 2018 Why you were hospitalized Your primary diagnosis was:  Combined Forms Of Age-Related Cataract Of Left Eye Follow-up Information Follow up With Details Comments Contact Info Jason Leo MD   [address] [facility] 
[phone] Discharge Orders None A check phoebe indicates which time of day the medication should be taken. My Medications ASK your doctor about these medications Instructions Each Dose to Equal  
 Morning Noon Evening Bedtime ALEVE 220 mg tablet Generic drug:  naproxen sodium Your last dose was: Your next dose is: Take 220-440 mg by mouth two (2) times daily (with meals). Prn  
 220-440 mg  
    
   
   
   
  
 aspirin delayed-release 81 mg tablet Your last dose was: Your next dose is: Take 81 mg by mouth daily. 81 mg  
    
   
   
   
  
 atenolol 50 mg tablet Commonly known as:  TENORMIN Your last dose was: Your next dose is: TAKE 1 TABLET BY MOUTH  DAILY  
     
   
   
   
  
 diclofenac 0.1 % ophthalmic solution Commonly known as:  VOLTAREN Your last dose was: Your next dose is:    
   
   
 Administer 1 Drop to right eye four (4) times daily. 1 Drop  
    
   
   
   
  
 multivitamin tablet Commonly known as:  ONE A DAY Your last dose was: Your next dose is: Take 1 Tab by mouth daily. 1 Tab PEPCID 20 mg tablet Generic drug:  famotidine Your last dose was: Your next dose is: Take 20 mg by mouth daily. 20 mg  
    
   
   
   
  
 pravastatin 20 mg tablet Commonly known as:  PRAVACHOL Your last dose was: Your next dose is: TAKE 1 TABLET BY MOUTH  NIGHTLY  
     
   
   
   
  
 prednisoLONE acetate 1 % ophthalmic suspension Commonly known as:  PRED FORTE Your last dose was: Your next dose is:    
   
   
 Administer  to right eye three (3) times daily. PROBIOTIC PO Your last dose was: Your next dose is: Take  by mouth daily. TYLENOL EXTRA STRENGTH 500 mg tablet Generic drug:  acetaminophen Your last dose was: Your next dose is: Take 2 Tabs by mouth every six (6) hours as needed for Pain. 1000 mg * VIGAMOX 0.5 % ophthalmic solution Generic drug:  moxifloxacin Your last dose was: Your next dose is:    
   
   
 Administer 1 Drop to right eye three (3) times daily. 1 Drop * VIGAMOX 0.5 % ophthalmic solution Generic drug:  moxifloxacin Your last dose was: Your next dose is:    
   
   
 Administer 1 Drop to left eye three (3) times daily. 1 Drop ZyrTEC 10 mg tablet Generic drug:  cetirizine Your last dose was: Your next dose is: Take 10 mg by mouth daily. 10 mg  
    
   
   
   
  
 * Notice: This list has 2 medication(s) that are the same as other medications prescribed for you. Read the directions carefully, and ask your doctor or other care provider to review them with you. Discharge Instructions Felix Kothari D.O., P.C. 
[facility] 183. 
[phone] Post-Operative Instructions for Cataract Surgery ? Remove your eye shield and bandage at 12 noon the same day as surgery and start your eye drops. THROW AWAY THE GAUZE UNDER THE SHIELD. ? Place the blue eye shield back on for one week while asleep, even if napping in the recliner. Use the tape included in your bag.   
       
 
? DO NOT RUB YOUR EYE EVER! DO NOT WIPE YOUR EYE! ONLY WIPE ON YOUR CHEEK! 
 
            DO NOT WEAR EYE MAKEUP FOR 1 WEEK! 
 
 
? You may take a bath today and you can shower starting tomorrow. ? You may resume your previous diet. ? If you use glaucoma drops in the operative eye, continue them as directed. ? Common symptoms after surgery include a scratchy feeling, slight headache, red eye, and/or blurred vision. You may use Advil or Tylenol for any discomfort. ? Avoid strenuous activities and driving until you see Dr. Jaja Johnson tomorrow. Please use care when walking, your depth perception may be altered. ? Bring your bag with your drops to your follow up appointment. Below are Instructions On How To Use Your Eye Drops. ON THE DAY OF SURGERY: 
 
? Use all three eye drops at 12 noon, 4pm, and 8pm.  Wait 10 minutes between drops. THE DAY AFTER SURGERY: 
 
? You will use the drops 4 times a day at 8am, 12 noon, 4pm, and 8pm. 
? Use the drops every day until bottles are empty. Vigamox (tan top) Put 1 drop in at 8am, 12 noon, 4pm, and 8pm. 
 
Pred Acetate (pink top) Put 1 drop in at 8:10am, 12:10pm, 4:10pm, and 8:10pm. Shake before using. Ketorolac Put 1 drop in at 8:20am, 12:20pm, 4:20pm, 8:20pm. 
 
CONTINUE DROPS UNTIL ALL BOTTLES ARE EMPTY! Follow-up appointment tomorrow at Dr. Latricia Powell office:_____9:15 a.m._______________ Please call the office at [phone] if you experience severe pain or nausea. The following personal items collected during your admission are returned to you:  
Dental Appliance: Dental Appliances: None Vision:   
Hearing Aid: @FLOW DISCHARGE SUMMARY from Nurse 
(1341:LAST)@ Jewelry: Jewelry: None Clothing: Clothing: None Other Valuables: Other Valuables: None Valuables sent to safe: PATIENT INSTRUCTIONS: 
 
 
B - Balance E - Eyes F-  Face looks uneven A-  Arms unable to move or move even S-  Speech slurred or non-existent T-  Time-call 911 as soon as signs and symptoms begin-DO NOT go Back to bed or wait to see if you get better-TIME IS BRAIN. If you have not received your influenza and/or pneumococcal vaccine, please follow up with your primary care physician. The discharge information has been reviewed with the patient and family. The patient and family verbalized understanding. ACO Transitions of Care Introducing [employer] [address] Tonya Chirinos offers a voluntary care coordination program to provide high quality service and care to [facility] fee-for-service beneficiaries. Jose Barrientos was designed to help you enhance your health and well-being through the following services: ? Transitions of Care  support for individuals who are transitioning from one care setting to another (example: Hospital to home). ? Chronic and Complex Care Coordination  support for individuals and caregivers of those with serious or chronic illnesses or with more than one chronic (ongoing) condition and those who take a number of different medications. If you meet specific medical criteria, a [address] [place] [address] Rd may call you directly to coordinate your care with your primary care physician and your other care providers. For questions about the [facility] CENTER programs, please, contact your physicians office. For general questions or additional information about Accountable Care Organizations: 
Please visit www.medicare.gov/acos. html or call 1-800-MEDICARE (0-794.330.3368) TTY users should call 2-583.323.8416. Introducing hospitals & HEALTH SERVICES! Dear Lissa Valiente: Thank you for requesting a [employer] account. Our records indicate that you already have an active WeSpeke account. You can access your account anytime at [email or web address]://Molecular Im[employer]. [employer] Imprints Did you know that you can access your hospital and ER discharge instructions at any time in We[employer]ke? You can also review all of your test results from your hospital stay or ER visit. Additional Information If you have questions, please visit the Frequently Asked Questions section of the [employer] website at [email or web address] [email or web address] [employer]/Molecular Imprints/. Remember, WeSpeke is NOT to be used for urgent needs. For medical emergencies, dial 911. Now available from your iPhone and Android! Introducing Deni Newman As a [facility] patient, I wanted to make you aware of our electronic visit tool called Deni Newman. [employer] [facility] 24/7 allows you to connect within minutes with a medical provider 24 hours a day, seven days a week via a mobile device or tablet or logging into a secure website from your computer. You can access [employer] from anywhere in the [place]. A virtual visit might be right for you when you have a simple condition and feel like you just dont want to get out of bed, or cant get away from work for an appointment, when your regular [facility] provider is not available (evenings, weekends or holidays), or when youre out of town and need minor care. Electronic visits cost only $49 and if the [employer] provider determines a prescription is needed to treat your condition, one can be electronically transmitted to a nearby pharmacy*. Please take a moment to enroll today if you have not already done so. The enrollment process is free and takes just a few minutes. To enroll, please download the [employer] ciarra to your tablet or phone, or visit [email or web address] to enroll on your computer. And, as an [address] [place] [address] patient with a y prime account, the results of your visits will be scanned into your electronic medical record and your primary care provider will be able to view the scanned results. We urge you to continue to see your regular [facility] provider for your ongoing medical care. And while your primary care provider may not be the one available when you seek a Deni Newman virtual visit, the peace of mind you get from getting a real diagnosis real time can be priceless. For more information on Deni E96asmitafin, view our Frequently Asked Questions (FAQs) at [email or web address]. Sincerely, 
 
Gemma Urban MD 
Chief Medical Officer Nestor Chirinos *:  certain medications cannot be prescribed via Deni E96yuki Providers Seen During Your Hospitalization Provider Specialty Primary office phone Armando Givens,  Ophthalmology [phone] Your Primary Care Physician (PCP) Primary Care Physician Office Phone Office Fax Mark Clancy [phone] You are allergic to the following Allergen Reactions Doxycycline Rash  
    
 Sulfa (Sulfonamide Antibiotics) Rash Tetracycline Rash Recent Documentation Height Weight BMI OB Status Smoking Status 1.664 m 94.3 kg 34.09 kg/m2 Postmenopausal Never Smoker Emergency Contacts Name Discharge Info Relation Home Work Mobile Rafa Lopez DISCHARGE CAREGIVER [3] Spouse [3] [phone] Patient Belongings The following personal items are in your possession at time of discharge: 
  Dental Appliances: None         Home Medications: None   Jewelry: None  Clothing: None    Other Valuables: None Please provide this summary of care documentation to your next provider. Signatures-by signing, you are acknowledging that this After Visit Summary has been reviewed with you and you have received a copy. Patient Signature:  ____________________________________________________________ Date:  ____________________________________________________________  
  
Beata Haines Provider Signature:  ____________________________________________________________ Date:  ____________________________________________________________

## 2018-09-05 NOTE — ANESTHESIA PREPROCEDURE EVALUATION
Anesthetic History   No history of anesthetic complications            Review of Systems / Medical History  Patient summary reviewed, nursing notes reviewed and pertinent labs reviewed    Pulmonary  Within defined limits                 Neuro/Psych         Psychiatric history     Cardiovascular    Hypertension          Hyperlipidemia    Exercise tolerance: >4 METS  Comments:  Hx bradycardia     GI/Hepatic/Renal     GERD    Renal disease: stones       Endo/Other    Diabetes: well controlled, type 2    Obesity  Pertinent negatives: No morbid obesity   Other Findings   Comments: Left eye cataract    allergic rhinitis  Hx motion sickness  Incomplete rectal prolapse- asymptomatic         Physical Exam    Airway  Mallampati: II  TM Distance: 4 - 6 cm  Neck ROM: normal range of motion   Mouth opening: Normal     Cardiovascular  Regular rate and rhythm,  S1 and S2 normal,  no murmur, click, rub, or gallop             Dental    Dentition: Bridges and Caps/crowns     Pulmonary  Breath sounds clear to auscultation               Abdominal  GI exam deferred       Other Findings            Anesthetic Plan    ASA: 2  Anesthesia type: total IV anesthesia and MAC          Induction: Intravenous  Anesthetic plan and risks discussed with: Patient

## 2018-09-05 NOTE — ANESTHESIA POSTPROCEDURE EVALUATION
Post-Anesthesia Evaluation and Assessment Patient: Penny Doan MRN: [id number]  SSN: [id number] YOB: 1950  Age: 75 y.o. Sex: female Cardiovascular Function/Vital Signs Visit Vitals  /60  Pulse (!) 53  Temp 36.5 °C (97.7 °F)  Resp 18  Ht 5' 5.5\" (1.664 m)  Wt 94.3 kg (208 lb)  SpO2 98%  BMI 34.09 kg/m2 Patient is status post total IV anesthesia, MAC anesthesia for Procedure(s): LEFT EYE FIRST CATARACT EXTRACTION WITH INTRA OCULAR LENS IMPLANT . Nausea/Vomiting: None Postoperative hydration reviewed and adequate. Pain: 
Pain Scale 1: Numeric (0 - 10) (09/05/18 2827) Pain Intensity 1: 0 (09/05/18 5490) Managed Neurological Status:  
Neuro (WDL): Within Defined Limits (09/05/18 8825) At baseline Mental Status and Level of Consciousness: Arousable Pulmonary Status:  
O2 Device: Room air (09/05/18 3893) Adequate oxygenation and airway patent Complications related to anesthesia: None Post-anesthesia assessment completed. No concerns Signed By: Billy Lai MD   
 September 5, 2018

## 2019-01-14 ENCOUNTER — OFFICE VISIT (OUTPATIENT)
Dept: FAMILY MEDICINE CLINIC | Age: 69
End: 2019-01-14

## 2019-01-14 ENCOUNTER — HOSPITAL ENCOUNTER (OUTPATIENT)
Dept: LAB | Age: 69
Discharge: HOME | End: 2019-01-14
Payer: MEDICARE

## 2019-01-14 VITALS
WEIGHT: 207 LBS | HEART RATE: 57 BPM | TEMPERATURE: 98.2 F | HEIGHT: 66 IN | BODY MASS INDEX: 33.27 KG/M2 | RESPIRATION RATE: 18 BRPM | OXYGEN SATURATION: 97 % | SYSTOLIC BLOOD PRESSURE: 142 MMHG | DIASTOLIC BLOOD PRESSURE: 84 MMHG

## 2019-01-14 DIAGNOSIS — J30.1 SEASONAL ALLERGIC RHINITIS DUE TO POLLEN: ICD-10-CM

## 2019-01-14 DIAGNOSIS — N30.00 ACUTE CYSTITIS WITHOUT HEMATURIA: ICD-10-CM

## 2019-01-14 DIAGNOSIS — I10 ESSENTIAL HYPERTENSION: ICD-10-CM

## 2019-01-14 DIAGNOSIS — E66.9 OBESITY (BMI 30-39.9): ICD-10-CM

## 2019-01-14 DIAGNOSIS — E78.5 HYPERLIPIDEMIA LDL GOAL <100: Primary | ICD-10-CM

## 2019-01-14 DIAGNOSIS — Z87.898 HISTORY OF MOTION SICKNESS: ICD-10-CM

## 2019-01-14 DIAGNOSIS — H35.341 MACULAR HOLE OF RIGHT EYE: ICD-10-CM

## 2019-01-14 DIAGNOSIS — E11.9 DIABETES MELLITUS WITHOUT COMPLICATION (HCC): ICD-10-CM

## 2019-01-14 DIAGNOSIS — F43.9 SITUATIONAL STRESS: ICD-10-CM

## 2019-01-14 DIAGNOSIS — K21.9 GASTROESOPHAGEAL REFLUX DISEASE WITHOUT ESOPHAGITIS: ICD-10-CM

## 2019-01-14 PROCEDURE — 87086 URINE CULTURE/COLONY COUNT: CPT

## 2019-01-14 PROCEDURE — 80053 COMPREHEN METABOLIC PANEL: CPT

## 2019-01-14 PROCEDURE — 80061 LIPID PANEL: CPT

## 2019-01-14 PROCEDURE — 83036 HEMOGLOBIN GLYCOSYLATED A1C: CPT

## 2019-01-14 NOTE — PROGRESS NOTES
HISTORY OF PRESENT ILLNESS  HPI  Luis Daniel Ling is a 70 y.o. Female with a history of HTN, GERD, DM, motion sickness and hyperlipidemia with LDL goal <100, who presents at the office today for a follow up. Pt does check her BP at home regularly. Pt denies unusual SOB, chest pain, and any recent ER visits or hospitalizations. Past Medical History:   Diagnosis Date    Adverse effect of anesthesia     treats pt for motion sickness with a patch    AR (allergic rhinitis)     Calculus of kidney     Dyspareunia     GERD (gastroesophageal reflux disease) 11/30/2010    HBP (high blood pressure) 8/9/2010    History of motion sickness 3/29/2014    Hyperlipidemia LDL goal <100 7/11/2018    Incomplete rectal prolapse- asymptomatic-2015- found on GYN exam 8/6/2015    Obesity (BMI 30-39.9) 6/30/2013     Past Surgical History:   Procedure Laterality Date    COLONOSCOPY N/A 10/4/2017    COLONOSCOPY performed by Murtaza Juarez MD at [facility] END[facility]COPY    HX CATARACT REMOVAL Right 08/2018    left 09/18    HX CHOLECYSTECTOMY  1987    HX COLONOSCOPY  2007    repeat in 2017    HX HEENT Right     macular hole repair on right eye    HX KNEE ARTHROSCOPY Left 02/15/2017    meniscus     Current Outpatient Medications on File Prior to Visit   Medication Sig Dispense Refill    moxifloxacin (VIGAMOX) 0.5 % ophthalmic solution Administer 1 Drop to left eye three (3) times daily.  diclofenac (VOLTAREN) 0.1 % ophthalmic solution Administer 1 Drop to right eye four (4) times daily.  prednisoLONE acetate (PRED FORTE) 1 % ophthalmic suspension Administer  to right eye three (3) times daily.  moxifloxacin (VIGAMOX) 0.5 % ophthalmic solution Administer 1 Drop to right eye three (3) times daily.       atenolol (TENORMIN) 50 mg tablet TAKE 1 TABLET BY MOUTH  DAILY 90 Tab 1    pravastatin (PRAVACHOL) 20 mg tablet TAKE 1 TABLET BY MOUTH  NIGHTLY 90 Tab 1    aspirin delayed-release 81 mg tablet Take 81 mg by mouth daily.      cetirizine (ZYRTEC) 10 mg tablet Take 10 mg by mouth daily.  famotidine (PEPCID) 20 mg tablet Take 20 mg by mouth daily.  multivitamin (ONE A DAY) tablet Take 1 Tab by mouth daily.  LACTOBACILLUS ACIDOPHILUS (PROBIOTIC PO) Take  by mouth daily.  naproxen sodium (ALEVE) 220 mg tablet Take 220-440 mg by mouth two (2) times daily (with meals). Prn      acetaminophen (TYLENOL EXTRA STRENGTH) 500 mg tablet Take 2 Tabs by mouth every six (6) hours as needed for Pain. No current facility-administered medications on file prior to visit. Allergies   Allergen Reactions    Doxycycline Rash    Sulfa (Sulfonamide Antibiotics) Rash    Tetracycline Rash     Family History   Problem Relation Age of Onset    Hypertension Mother    [facility] Elevated Lipids Mother     Arrhythmia Mother         a. fib    Alcohol abuse Father     Arrhythmia Paternal Grandmother 80        had a pacemaker    Heart Attack Paternal Grandfather 79    Cancer Other         melanoma in daughter     Social History     Socioeconomic History    Marital status:      Spouse name: Not on file    Number of children: Not on file    Years of education: Not on file    Highest education level: Not on file   Tobacco Use    Smoking status: Never Smoker    Smokeless tobacco: Never Used   Substance and Sexual Activity    Alcohol use: Yes     Comment: occ    Drug use: No    Sexual activity: Yes   Other Topics Concern     Service No    Blood Transfusions No    Caffeine Concern No    Occupational Exposure No    Hobby Hazards No    Sleep Concern No    Stress Concern No    Weight Concern Yes    Special Diet No    Back Care No    Exercise Yes     Comment: walking    Bike Helmet No     Comment: does not ride   [address] Yes    Self-Exams Yes           Review of Systems   Constitutional: Negative for chills, diaphoresis, fever, malaise/fatigue and weight loss.    Eyes: Negative for blurred vision, double vision and pain. Respiratory: Negative for cough, shortness of breath and wheezing. Cardiovascular: Negative for chest pain, palpitations, orthopnea, claudication, leg swelling and PND. Genitourinary: Negative for frequency. Skin: Negative for itching and rash. Neurological: Negative for dizziness, tingling, tremors, sensory change, speech change, focal weakness, seizures, loss of consciousness, weakness and headaches. Endo/Heme/Allergies: Negative for environmental allergies and polydipsia. Does not bruise/bleed easily. Results for orders placed or performed in visit on 01/14/19   CULTURE, URINE   Result Value Ref Range    Urine Culture, Routine       Mixed urogenital chago  10,000-25,000 colony forming units per mL     LIPID PANEL   Result Value Ref Range    Cholesterol, total 185 100 - 199 mg/dL    Triglyceride 85 0 - 149 mg/dL    HDL Cholesterol 78 >39 mg/dL    VLDL, calculated 17 5 - 40 mg/dL    LDL, calculated 90 0 - 99 mg/dL   METABOLIC PANEL, COMPREHENSIVE   Result Value Ref Range    Glucose 108 (H) 65 - 99 mg/dL    BUN 16 8 - 27 mg/dL    Creatinine 0.86 0.57 - 1.00 mg/dL    GFR est non-AA 69 >59 mL/min/1.73    GFR est AA 80 >59 mL/min/1.73    BUN/Creatinine ratio 19 12 - 28    Sodium 140 134 - 144 mmol/L    Potassium 4.7 3.5 - 5.2 mmol/L    Chloride 103 96 - 106 mmol/L    CO2 23 20 - 29 mmol/L    Calcium 9.4 8.7 - 10.3 mg/dL    Protein, total 6.4 6.0 - 8.5 g/dL    Albumin 4.3 3.6 - 4.8 g/dL    GLOBULIN, TOTAL 2.1 1.5 - 4.5 g/dL    A-G Ratio 2.0 1.2 - 2.2    Bilirubin, total 0.6 0.0 - 1.2 mg/dL    Alk.  phosphatase 122 (H) 39 - 117 IU/L    AST (SGOT) 16 0 - 40 IU/L    ALT (SGPT) 16 0 - 32 IU/L   HEMOGLOBIN A1C WITH EAG   Result Value Ref Range    Hemoglobin A1c 5.5 4.8 - 5.6 %    Estimated average glucose 111 mg/dL   CVD REPORT   Result Value Ref Range    INTERPRETATION Note          Physical Exam  Visit Vitals  /84 (BP 1 Location: Right arm, BP Patient Position: Sitting)   Pulse (!) 57   Temp 98.2 °F (36.8 °C) (Oral)   Resp 18   Ht 5' 5.5\" (1.664 m)   Wt 207 lb (93.9 kg)   SpO2 97%   BMI 33.92 kg/m²     Head: Normocephalic, without obvious abnormality, atraumatic  Eyes: conjunctivae/corneas clear. PERRL, EOM's intact. Neck: supple, symmetrical, trachea midline, no adenopathy, thyroid: not enlarged, symmetric, no tenderness/mass/nodules, no carotid bruit and no JVD  Lungs: clear to auscultation bilaterally  Heart: regular rate and rhythm, S1, S2 normal, no murmur, click, rub or gallop  Extremities: extremities normal, atraumatic, no cyanosis or edema  Pulses: 2+ and symmetric  Lymph nodes: Cervical, supraclavicular, and axillary nodes normal.  Neurologic: Grossly normal      ASSESSMENT and PLAN    ICD-10-CM ICD-9-CM    1. Hyperlipidemia LDL goal <100 E78.5 272.4 LIPID PANEL      METABOLIC PANEL, COMPREHENSIVE   2. Acute cystitis without hematuria N30.00 595.0 CULTURE, URINE   3. Obesity (BMI 30-39. 9) E66.9 278.00    4. Diabetes mellitus without complication ([address]- 1393 E11.9 250.00 HEMOGLOBIN A1C WITH EAG   5. Gastroesophageal reflux disease without esophagitis K21.9 530.81    6. Essential hypertension I10 401.9    7. Seasonal allergic rhinitis due to pollen J30.1 477.0    8. Macular hole of right eye H35.341 362.54    9. Situational stress-elderly Mom in [place] F43.9 V62.89    10. History of motion sickness Z87.898 V13.89      Diagnoses and all orders for this visit:    1. Hyperlipidemia LDL goal <100  -     LIPID PANEL  -     METABOLIC PANEL, COMPREHENSIVE    2. Acute cystitis without hematuria  -     CULTURE, URINE    3. Obesity (BMI 30-39.9)    4. Diabetes mellitus without complication ([address]- 7086  -     HEMOGLOBIN A1C WITH EAG    5. Gastroesophageal reflux disease without esophagitis    6. Essential hypertension    7. Seasonal allergic rhinitis due to pollen    8. Macular hole of right eye    9. Situational stress-elderly Mom in [place]    10.  History of motion sickness    Other orders  -     CVD REPORT      Follow-up Disposition:  Return in about 6 months (around 7/14/2019) for F/U DM, F/U HTN and CHOL. lab results and schedule of future lab studies reviewed with patient  reviewed diet, exercise and weight control  cardiovascular risk and specific lipid/LDL goals reviewed  reviewed medications and side effects in detail  Please call my office if there are any questions- [phone]. I will arrange for follow up after the lab tests done from today return  Recommended a weekly \"heart check. \" I went into detail how to do this. Call for refills on medications as needed. Discussed expected course/resolution/complications of diagnosis in detail with patient. Medication risks/benefits/costs/interactions/alternatives discussed with patient. Pt was given an after visit summary which includes diagnoses, current medications & vitals. Pt expressed understanding with the diagnosis and plan. Total 25 minutes,60 % counseling re:     Reviewed symptoms, or lack thereof, of hypertension and elevated cholesterol. BMI is significantly elevated- in the obese range. I reviewed diet, exercise and weight control. Discussed weight control in detail, the importance of mainly decreased carbs, and for weight maintenance, exercise; discussed different diets and that it isn't as important to watch the type of foods as it is to decrease calorie intake no matter what type of diet you do, etc.      Reviewed BP, cholesterol and diabetic goals. LDL goal<100,HDL goal>45, Triglyceride goal<150, A1C< 7.0, BP<140/90.      Discussed specific diabetic recommendations: low cholesterol diet, weight control and daily exercise discussed, home glucose monitoring emphasized, all medications, side effects and compliance discussed carefully, foot care discussed and Podiatry visits discussed, annual eye examinations at Ophthalmology discussed, glycohemoglobin and other lab monitoring discussed and long term diabetic complications discussed. Pt did receive her second shingles shot on 12/14/2018. Her glucose was 174 when checked in 2015, and she has lost 20 lbs since then. Her A1C was 6.4 in 2015, and her most recent A1C was 5.8. I encouraged her to check her BP at least weekly, and to call us when she has three separate readings. Regular exercise is very important to your health; it helps mentally, physically, socially; it prevents injuries if done properly. Exercise, even as simple as walking 20-30 minutes daily has major benefits to your health even though your \"numbers\" are the same in the lab. See if you can add this into your daily regimen and after a few months it will become a regular habit-\"just something you do,\" like brushing your teeth. A combination of aerobic exercise and strengthening and stretching is felt to be the best for you, so this should be your ultimate goal.   This can be done in the privacy of your home or in a group setting as at the gym  Some prefer having a , others prefer to do exercise in groups or individually. Do what \"works\" for you. You need to make it simple and \"fun,\" or you most likely you will not continue it. Also, discussed symptoms of concern that were noted today in the note above, treatment options( including doing nothing), when to follow up before recommended time frame. Also, answered all questions. Recommended a weekly \"heart check. \" I went into detail how to do this. This document was written by Elfredia Class, as dictated by Oneil To MD.  I have reviewed and agree with the above note and have made corrections where appropriate Jagdeep Torres M.D.

## 2019-01-14 NOTE — PROGRESS NOTES
Chief Complaint   Patient presents with    Cholesterol Problem     follow up       1. Have you been to the ER, urgent care clinic since your last visit? Hospitalized since your last visit? No    2. Have you seen or consulted any other health care providers outside of the [address] [place] [address] since your last visit? Include any pap smears or colon screening.  No

## 2019-01-15 LAB
ALBUMIN SERPL-MCNC: 4.3 G/DL (ref 3.6–4.8)
ALBUMIN/GLOB SERPL: 2 {RATIO} (ref 1.2–2.2)
ALP SERPL-CCNC: 122 IU/L (ref 39–117)
ALT SERPL-CCNC: 16 IU/L (ref 0–32)
AST SERPL-CCNC: 16 IU/L (ref 0–40)
BACTERIA UR CULT: NORMAL
BILIRUB SERPL-MCNC: 0.6 MG/DL (ref 0–1.2)
BUN SERPL-MCNC: 16 MG/DL (ref 8–27)
BUN/CREAT SERPL: 19 (ref 12–28)
CALCIUM SERPL-MCNC: 9.4 MG/DL (ref 8.7–10.3)
CHLORIDE SERPL-SCNC: 103 MMOL/L (ref 96–106)
CHOLEST SERPL-MCNC: 185 MG/DL (ref 100–199)
CO2 SERPL-SCNC: 23 MMOL/L (ref 20–29)
CREAT SERPL-MCNC: 0.86 MG/DL (ref 0.57–1)
EST. AVERAGE GLUCOSE BLD GHB EST-MCNC: 111 MG/DL
GLOBULIN SER CALC-MCNC: 2.1 G/DL (ref 1.5–4.5)
GLUCOSE SERPL-MCNC: 108 MG/DL (ref 65–99)
HBA1C MFR BLD: 5.5 % (ref 4.8–5.6)
HDLC SERPL-MCNC: 78 MG/DL
INTERPRETATION, 910389: NORMAL
LDLC SERPL CALC-MCNC: 90 MG/DL (ref 0–99)
POTASSIUM SERPL-SCNC: 4.7 MMOL/L (ref 3.5–5.2)
PROT SERPL-MCNC: 6.4 G/DL (ref 6–8.5)
SODIUM SERPL-SCNC: 140 MMOL/L (ref 134–144)
TRIGL SERPL-MCNC: 85 MG/DL (ref 0–149)
VLDLC SERPL CALC-MCNC: 17 MG/DL (ref 5–40)

## 2019-01-31 ENCOUNTER — TELEPHONE (OUTPATIENT)
Dept: FAMILY MEDICINE CLINIC | Age: 69
End: 2019-01-31

## 2019-02-05 NOTE — TELEPHONE ENCOUNTER
Pharmacy requesting medication refill     Requested Prescriptions     Pending Prescriptions Disp Refills    scopolamine (TRANSDERM-SCOP) 1 mg over 3 days pt3d 12 Patch 5     Si Patch by TransDERmal route every seventy-two (72) hours.      Pharmacy on file verified  ([facility])

## 2019-02-06 RX ORDER — SCOLOPAMINE TRANSDERMAL SYSTEM 1 MG/1
1 PATCH, EXTENDED RELEASE TRANSDERMAL
Qty: 36 PATCH | Refills: 1 | Status: SHIPPED | OUTPATIENT
Start: 2019-02-06 | End: 2020-01-20 | Stop reason: ALTCHOICE

## 2019-02-07 NOTE — PROGRESS NOTES
GREAT NEWS!! All of your recent lab tests are normal or at goal.  No signs of active diabetes, normal liver and kidney tests; A1C @ 5.5. Great cholesterol numbers. I would continue everything the same and schedule your next fasting office visit in 6 months. In addition, a physical/ Annual Wellness Visit is recommended every 2 years until the age of 61, but a prostate check should be done on a yearly basis after 36.

## 2019-07-15 ENCOUNTER — HOSPITAL ENCOUNTER (OUTPATIENT)
Dept: LAB | Age: 69
Discharge: HOME | End: 2019-07-15
Payer: MEDICARE

## 2019-07-15 ENCOUNTER — OFFICE VISIT (OUTPATIENT)
Dept: FAMILY MEDICINE CLINIC | Age: 69
End: 2019-07-15

## 2019-07-15 VITALS
HEIGHT: 66 IN | WEIGHT: 210 LBS | HEART RATE: 55 BPM | SYSTOLIC BLOOD PRESSURE: 132 MMHG | BODY MASS INDEX: 33.75 KG/M2 | DIASTOLIC BLOOD PRESSURE: 70 MMHG | TEMPERATURE: 97.7 F | OXYGEN SATURATION: 96 % | RESPIRATION RATE: 18 BRPM

## 2019-07-15 DIAGNOSIS — H35.341 MACULAR HOLE OF RIGHT EYE: ICD-10-CM

## 2019-07-15 DIAGNOSIS — E78.00 PURE HYPERCHOLESTEROLEMIA: ICD-10-CM

## 2019-07-15 DIAGNOSIS — K21.9 GASTROESOPHAGEAL REFLUX DISEASE WITHOUT ESOPHAGITIS: ICD-10-CM

## 2019-07-15 DIAGNOSIS — F43.9 SITUATIONAL STRESS: ICD-10-CM

## 2019-07-15 DIAGNOSIS — E66.9 OBESITY (BMI 30-39.9): ICD-10-CM

## 2019-07-15 DIAGNOSIS — K62.3 INCOMPLETE RECTAL PROLAPSE: ICD-10-CM

## 2019-07-15 DIAGNOSIS — Z71.89 ACP (ADVANCE CARE PLANNING): ICD-10-CM

## 2019-07-15 DIAGNOSIS — Z87.898 HISTORY OF MOTION SICKNESS: ICD-10-CM

## 2019-07-15 DIAGNOSIS — J30.1 SEASONAL ALLERGIC RHINITIS DUE TO POLLEN: ICD-10-CM

## 2019-07-15 DIAGNOSIS — I10 ESSENTIAL HYPERTENSION: ICD-10-CM

## 2019-07-15 DIAGNOSIS — J30.1 CHRONIC SEASONAL ALLERGIC RHINITIS DUE TO POLLEN: ICD-10-CM

## 2019-07-15 DIAGNOSIS — E78.5 HYPERLIPIDEMIA LDL GOAL <100: ICD-10-CM

## 2019-07-15 DIAGNOSIS — E11.9 DIABETES MELLITUS WITHOUT COMPLICATION (HCC): Primary | ICD-10-CM

## 2019-07-15 PROCEDURE — 83036 HEMOGLOBIN GLYCOSYLATED A1C: CPT

## 2019-07-15 PROCEDURE — 80061 LIPID PANEL: CPT

## 2019-07-15 PROCEDURE — 80053 COMPREHEN METABOLIC PANEL: CPT

## 2019-07-15 RX ORDER — GLUCOSAMINE SULFATE 1500 MG
POWDER IN PACKET (EA) ORAL
COMMUNITY

## 2019-07-15 RX ORDER — ATENOLOL 50 MG/1
50 TABLET ORAL DAILY
Qty: 90 TAB | Refills: 1 | Status: SHIPPED | OUTPATIENT
Start: 2019-07-15 | End: 2020-03-01

## 2019-07-15 RX ORDER — PRAVASTATIN SODIUM 20 MG/1
20 TABLET ORAL DAILY
Qty: 90 TAB | Refills: 1 | Status: SHIPPED | OUTPATIENT
Start: 2019-07-15 | End: 2020-03-01

## 2019-07-15 NOTE — PROGRESS NOTES
Chief Complaint   Patient presents with    Cholesterol Problem     6 month OV- pt fasting      1. Have you been to the ER, urgent care clinic since your last visit? Hospitalized since your last visit? NO      2. Have you seen or consulted any other health care providers outside of the [address] [place] [address] since your last visit? Include any pap smears or colon screening.  No

## 2019-07-15 NOTE — PROGRESS NOTES
HISTORY OF PRESENT ILLNESS  HPI  Geraldo Barnard is a 70 y.o. Female with a history of HTN, GERD, DM, calculus of kidney, dyspareunia, motion sickness, age-related cataracts and hyperlipidemia with LDL goal <100, who presents to the office today for a follow up on her cholesterol. Pt denies family history of high blood sugar. Pt is regularly active without any issues. Pt denies unusual SOB, chest pain, and any recent ER visits or hospitalizations. Past Medical History:   Diagnosis Date    Adverse effect of anesthesia     treats pt for motion sickness with a patch    AR (allergic rhinitis)     Calculus of kidney     Dyspareunia     GERD (gastroesophageal reflux disease) 11/30/2010    HBP (high blood pressure) 8/9/2010    History of motion sickness 3/29/2014    Hyperlipidemia LDL goal <100 7/11/2018    Incomplete rectal prolapse- asymptomatic-2015- found on GYN exam 8/6/2015    Obesity (BMI 30-39.9) 6/30/2013     Past Surgical History:   Procedure Laterality Date    COLONOSCOPY N/A 10/4/2017    COLONOSCOPY performed [facility] Magi Rodriguez [facility]    HX CATARACT REMOVAL Right 08/2018    left 09/18    HX CHOLECYSTECTOMY  1987    HX COLONOSCOPY  2007    repeat in 2017    HX HEENT Right     macular hole repair on right eye    HX KNEE ARTHROSCOPY Left 02/15/2017    meniscus     Current Outpatient Medications on File Prior to Visit   Medication Sig Dispense Refill    omega 3-dha-epa-fish oil (FISH OIL) 100-160-1,000 mg cap Fish Oil      cholecalciferol (VITAMIN D3) 1,000 unit cap Vitamin D3      scopolamine (TRANSDERM-SCOP) 1 mg over 3 days pt3d 1 Patch by TransDERmal route every seventy-two (72) hours. 36 Patch 1    aspirin delayed-release 81 mg tablet Take 81 mg by mouth daily.  cetirizine (ZYRTEC) 10 mg tablet Take 10 mg by mouth daily.  famotidine (PEPCID) 20 mg tablet Take 20 mg by mouth daily.  multivitamin (ONE A DAY) tablet Take 1 Tab by mouth daily.       LACTOBACILLUS ACIDOPHILUS (PROBIOTIC PO) Take  by mouth daily.  naproxen sodium (ALEVE) 220 mg tablet Take 220-440 mg by mouth two (2) times daily (with meals). Prn      acetaminophen (TYLENOL EXTRA STRENGTH) 500 mg tablet Take 2 Tabs by mouth every six (6) hours as needed for Pain.  [DISCONTINUED] moxifloxacin (VIGAMOX) 0.5 % ophthalmic solution Administer 1 Drop to left eye three (3) times daily.  [DISCONTINUED] diclofenac (VOLTAREN) 0.1 % ophthalmic solution Administer 1 Drop to right eye four (4) times daily.  [DISCONTINUED] prednisoLONE acetate (PRED FORTE) 1 % ophthalmic suspension Administer  to right eye three (3) times daily.  [DISCONTINUED] moxifloxacin (VIGAMOX) 0.5 % ophthalmic solution Administer 1 Drop to right eye three (3) times daily. No current facility-administered medications on file prior to visit.       Allergies   Allergen Reactions    Doxycycline Rash    Sulfa (Sulfonamide Antibiotics) Rash    Tetracycline Rash     Family History   Problem Relation Age of Onset    Hypertension Mother    [facility] Elevated Lipids Mother     Arrhythmia Mother         a. fib    Alcohol abuse Father     Arrhythmia Paternal Grandmother 80        had a pacemaker    Heart Attack Paternal Grandfather 79    Cancer Other         melanoma in daughter     Social History     Socioeconomic History    Marital status:      Spouse name: Not on file    Number of children: Not on file    Years of education: Not on file    Highest education level: Not on file   Tobacco Use    Smoking status: Never Smoker    Smokeless tobacco: Never Used   Substance and Sexual Activity    Alcohol use: Yes     Comment: occ    Drug use: No    Sexual activity: Yes   Other Topics Concern     Service No    Blood Transfusions No    Caffeine Concern No    Occupational Exposure No    Hobby Hazards No    Sleep Concern No    Stress Concern No    Weight Concern Yes    Special Diet No    Back Care No    Exercise Yes     Comment: walking    Bike Helmet No     Comment: does not ride   [address] Yes    Self-Exams Yes             Review of Systems   Constitutional: Negative for chills, diaphoresis, fever, malaise/fatigue and weight loss. Eyes: Negative for blurred vision, double vision and pain. Respiratory: Negative for cough, shortness of breath and wheezing. Cardiovascular: Negative for chest pain, palpitations, orthopnea, claudication, leg swelling and PND. Genitourinary: Negative for frequency. Skin: Negative for itching and rash. Neurological: Negative for dizziness, tingling, tremors, sensory change, speech change, focal weakness, seizures, loss of consciousness, weakness and headaches. Endo/Heme/Allergies: Negative for environmental allergies and polydipsia. Does not bruise/bleed easily. Results for orders placed or performed in visit on 78/54/88   METABOLIC PANEL, COMPREHENSIVE   Result Value Ref Range    Glucose 116 (H) 65 - 99 mg/dL    BUN 14 8 - 27 mg/dL    Creatinine 0.93 0.57 - 1.00 mg/dL    GFR est non-AA 63 >59 mL/min/1.73    GFR est AA 73 >59 mL/min/1.73    BUN/Creatinine ratio 15 12 - 28    Sodium 140 134 - 144 mmol/L    Potassium 4.9 3.5 - 5.2 mmol/L    Chloride 103 96 - 106 mmol/L    CO2 24 20 - 29 mmol/L    Calcium 9.5 8.7 - 10.3 mg/dL    Protein, total 6.4 6.0 - 8.5 g/dL    Albumin 4.4 3.6 - 4.8 g/dL    GLOBULIN, TOTAL 2.0 1.5 - 4.5 g/dL    A-G Ratio 2.2 1.2 - 2.2    Bilirubin, total 0.6 0.0 - 1.2 mg/dL    Alk.  phosphatase 116 39 - 117 IU/L    AST (SGOT) 18 0 - 40 IU/L    ALT (SGPT) 17 0 - 32 IU/L   LIPID PANEL   Result Value Ref Range    Cholesterol, total 205 (H) 100 - 199 mg/dL    Triglyceride 107 0 - 149 mg/dL    HDL Cholesterol 79 >39 mg/dL    VLDL, calculated 21 5 - 40 mg/dL    LDL, calculated 105 (H) 0 - 99 mg/dL   HEMOGLOBIN A1C WITH EAG   Result Value Ref Range    Hemoglobin A1c 5.5 4.8 - 5.6 %    Estimated average glucose 111 mg/dL   CVD REPORT   Result Value Ref Range    INTERPRETATION Note          Physical Exam  Visit Vitals  /70 (BP 1 Location: Right arm, BP Patient Position: Sitting)   Pulse (!) 55   Temp 97.7 °F (36.5 °C) (Oral)   Resp 18   Ht 5' 5.5\" (1.664 m)   Wt 210 lb (95.3 kg)   SpO2 96%   BMI 34.41 kg/m²       Head: Normocephalic, without obvious abnormality, atraumatic  Eyes: conjunctivae/corneas clear. PERRL, EOM's intact. Neck: supple, symmetrical, trachea midline, no adenopathy, thyroid: not enlarged, symmetric, no tenderness/mass/nodules, no carotid bruit and no JVD  Lungs: clear to auscultation bilaterally  Heart: regular rate and rhythm, S1, S2 normal, no murmur, click, rub or gallop  Extremities: extremities normal, atraumatic, no cyanosis or edema  Pulses: 2+ and symmetric  Lymph nodes: Cervical, supraclavicular, and axillary nodes normal.  Neurologic: Grossly normal    Diabetic foot exam:     Left: Reflexes 2+     Proprioception normal    Filament test normal sensation with micro filament   Pulse DP: 2+ (normal)   Pulse PT: 2+ (normal)   Deformities: None  Right: Reflexes 2+              Proprioception normal   Filament test normal sensation with micro filament   Pulse DP: 2+ (normal)   Pulse PT: 2+ (normal)   Deformities: None         ASSESSMENT and PLAN    ICD-10-CM ICD-9-CM    1. [facility]- 9510 E11.9 250.00 HEMOGLOBIN A1C WITH EAG       DIABETES FOOT EXAM      MICROALBUMIN, UR, RAND W/ MICROALB/CREAT RATIO      CANCELED: MICROALBUMIN, UR, 24 HR   2. ACP (advance care planning) Z71.89 V65.49    3. Hyperlipidemia LDL goal <100 Q80.8 543.3 METABOLIC PANEL, COMPREHENSIVE      LIPID PANEL      pravastatin (PRAVACHOL) 20 mg tablet   4. Pure hypercholesterolemia E78.00 272.0    5. Essential hypertension I10 401.9 atenolol (TENORMIN) 50 mg tablet   6. Obesity (BMI 30-39. 9) E66.9 278.00    7. Chronic seasonal allergic rhinitis due to pollen J30.1 477.0    8.  Situational stress-elderly Mom in [place] F43.9 V62. 89    9. Gastroesophageal reflux disease without esophagitis K21.9 530.81    10. Seasonal allergic rhinitis due to pollen J30.1 477.0    11. History of motion sickness Z87.898 V13.89    12. Macular hole of right eye H35.341 362.54    13. Incomplete rectal prolapse- asymptomatic-2015- found on GYN exam K62.3 569.1      Diagnoses and all orders for this visit:    1. Diabetes mellitus without complication ([facility]- 8819  -     HEMOGLOBIN A1C WITH EAG  -      DIABETES FOOT EXAM  -     MICROALBUMIN, UR, RAND W/ MICROALB/CREAT RATIO    2. ACP (advance care planning)    3. Hyperlipidemia LDL goal <359  -     METABOLIC PANEL, COMPREHENSIVE  -     LIPID PANEL  -     pravastatin (PRAVACHOL) 20 mg tablet; Take 1 Tab by mouth daily. 4. Pure hypercholesterolemia    5. Essential hypertension  -     atenolol (TENORMIN) 50 mg tablet; Take 1 Tab by mouth daily. 6. Obesity (BMI 30-39.9)    7. Chronic seasonal allergic rhinitis due to pollen    8. Situational stress-elderly Mom in [place]    9. Gastroesophageal reflux disease without esophagitis    10. Seasonal allergic rhinitis due to pollen    11. History of motion sickness    12. Macular hole of right eye    13. Incomplete rectal prolapse- asymptomatic-2015- found on GYN exam    Other orders  -     CVD REPORT      Follow-up and Dispositions    · Return in about 6 months (around 1/15/2020) for F/U HTN,CHOL,DM, F/U of obesity, osteoarthritis, GERD, allergic rhinitis. lab results and schedule of future lab studies reviewed with patient  reviewed diet, exercise and weight control  cardiovascular risk and specific lipid/LDL goals reviewed  reviewed medications and side effects in detail  Please call my office if there are any questions- [phone]. I will arrange for follow up after the lab tests done from today return  Recommended a weekly \"heart check. \" I went into detail how to do this. Call for refills on medications as needed.   Discussed expected course/resolution/complications of diagnosis in detail with patient. Medication risks/benefits/costs/interactions/alternatives discussed with patient. Pt was given an after visit summary which includes diagnoses, current medications & vitals. Pt expressed understanding with the diagnosis and plan. Total 25 minutes,60 % counseling re: Recommended a weekly \"heart check. \" I went into detail how to do this. Review of  the proper technique of checking the blood pressure- check it on an average day only, not on a stressful day, sitting, no exercise for at least 1 hour and not experiencing any new pain( chronic pain is OK). Patient encouraged to check BP sitting and standing at least once a month and to report these readings to me if > 140/ 90 on average , or if the standing BP is >  15 points lower than the sitting. Always check it twice and if there is > 5 points decrease from the previous reading( top reading or systolic) keep checking it until it does not drop 5 points. Write only this final reading down, not the preceding readings. If out of these readings there is only 1 out of 4 or less > 353, or > 90 diastolic then your blood pressure is OK; it needs further treatment if it is above this. Also, don't forget,  as noted above, to check your blood pressure standing once a month; this is to detect a drop in your BP that might lead to fainting and serious injury; you check it standing with your arm hanging straight down and relaxed. Check it twice waiting 1 minute between the two readings. If, with either one of these 2 readings there is a > 15 point drop of the systolic compared to your sitting pressure( done before the standing BP), then let me know. Following these guidelines, continue to check your BP and write down only the ones described above and it will help me to effectively treat your blood pressure. Reviewed symptoms, or lack thereof, of hypertension and elevated cholesterol. Reviewed BP, cholesterol and diabetic goals. LDL goal<100,HDL goal>45, Triglyceride goal<150, A1C< 7.0, BP<140/90. Discussed specific diabetic recommendations: low cholesterol diet, weight control and daily exercise discussed, home glucose monitoring emphasized, all medications, side effects and compliance discussed carefully, foot care discussed and Podiatry visits discussed, annual eye examinations at Ophthalmology discussed, glycohemoglobin and other lab monitoring discussed and long term diabetic complications discussed. BMI is significantly elevated- in the obese range. I reviewed diet, exercise and weight control. Discussed weight control in detail, the importance of mainly decreased carbs, and for weight maintenance, exercise; discussed different diets and that it isn't as important to watch the type of foods as it is to decrease calorie intake no matter what type of diet you do, etc.        Regular exercise is very important to your health; it helps mentally, physically, socially; it prevents injuries if done properly. Exercise, even as simple as walking 20-30 minutes daily has major benefits to your health even though your \"numbers\" are the same in the lab. See if you can add this into your daily regimen and after a few months it will become a regular habit-\"just something you do,\" like brushing your teeth. A combination of aerobic exercise and strengthening and stretching is felt to be the best for you, so this should be your ultimate goal.   This can be done in the privacy of your home or in a group setting as at the gym  Some prefer having a , others prefer to do exercise in groups or individually. Do what \"works\" for you. You need to make it simple and \"fun,\" or you most likely you will not continue it. Talked about her diabetic tendency and to try and continue with her weight loss.  Suggested that she try a low carb diet, which has worked for her in the past.    Also, discussed symptoms of concern that were noted today in the note above, treatment options( including doing nothing), when to follow up before recommended time frame. Also, answered all questions. This document was written by Joy Hubbard, as dictated by Lacey Reynolds MD.  I have reviewed and agree with the above note and have made corrections where appropriate Jagdeep Villa M.D. This is the Subsequent Medicare Annual Wellness Exam, performed 12 months or more after the Initial AWV or the last Subsequent AWV    I have reviewed the patient's medical history in detail and updated the computerized patient record. History     Patient Active Problem List   Diagnosis Code    Dyspareunia [id number]    Calculus of kidney N20.0    Obesity (BMI 30-39. 9) E66.9    Situational stress-elderly Mom in [place] F43.9    History of motion sickness Z87.898    Incomplete rectal prolapse- asymptomatic-2015- found on GYN exam K62.3    Diabetes mellitus without complication ([facility]- 9651 E11.9    Essential hypertension I10    Allergic rhinitis due to pollen J30.1    Gastroesophageal reflux disease without esophagitis K21.9    Benign papule- anterior to right pinna12/2015 R23.8    ACP (advance care planning) Z71.89    Hyperlipidemia LDL goal <100 E78.5    Combined forms of age-related cataract of right eye H25.811    Combined forms of age-related cataract of left eye H25.812    HBP (high blood pressure) I10    GERD (gastroesophageal reflux disease) K21.9    AR (allergic rhinitis) J30.9     Past Medical History:   Diagnosis Date    Adverse effect of anesthesia     treats pt for motion sickness with a patch    AR (allergic rhinitis)     Calculus of kidney     Dyspareunia     GERD (gastroesophageal reflux disease) 11/30/2010    HBP (high blood pressure) 8/9/2010    History of motion sickness 3/29/2014    Hyperlipidemia LDL goal <100 7/11/2018    Incomplete rectal prolapse- asymptomatic-2015- found on GYN exam 8/6/2015    Obesity (BMI 30-39.9) 6/30/2013      Past Surgical History:   Procedure Laterality Date    COLONOSCOPY N/A 10/4/2017    COLONOSCOPY performed by Leigh Huang MD at [facility] ENDOSCOPY    HX CATARACT REMOVAL Right 08/2018    left 09/18    HX CHOLECYSTECTOMY  1987    HX COLONOSCOPY  2007    repeat in 2017    HX HEENT Right     macular hole repair on right eye    HX KNEE ARTHROSCOPY Left 02/15/2017    meniscus     Current Outpatient Medications   Medication Sig Dispense Refill    omega 3-dha-epa-fish oil (FISH OIL) 100-160-1,000 mg cap Fish Oil      cholecalciferol (VITAMIN D3) 1,000 unit cap Vitamin D3      pravastatin (PRAVACHOL) 20 mg tablet Take 1 Tab by mouth daily. 90 Tab 1    atenolol (TENORMIN) 50 mg tablet Take 1 Tab by mouth daily. 90 Tab 1    scopolamine (TRANSDERM-SCOP) 1 mg over 3 days pt3d 1 Patch by TransDERmal route every seventy-two (72) hours. 36 Patch 1    aspirin delayed-release 81 mg tablet Take 81 mg by mouth daily.  cetirizine (ZYRTEC) 10 mg tablet Take 10 mg by mouth daily.  famotidine (PEPCID) 20 mg tablet Take 20 mg by mouth daily.  multivitamin (ONE A DAY) tablet Take 1 Tab by mouth daily.  LACTOBACILLUS ACIDOPHILUS (PROBIOTIC PO) Take  by mouth daily.  naproxen sodium (ALEVE) 220 mg tablet Take 220-440 mg by mouth two (2) times daily (with meals). Prn      acetaminophen (TYLENOL EXTRA STRENGTH) 500 mg tablet Take 2 Tabs by mouth every six (6) hours as needed for Pain.        Allergies   Allergen Reactions    Doxycycline Rash    Sulfa (Sulfonamide Antibiotics) Rash    Tetracycline Rash       Family History   Problem Relation Age of Onset    Hypertension Mother     Elevated Lipids Mother     Arrhythmia Mother         a. fib    Alcohol abuse Father     Arrhythmia Paternal Grandmother 80        had a pacemaker    Heart Attack Paternal Grandfather 79    Cancer Other         melanoma in daughter     Social History     Tobacco Use    Smoking status: Never Smoker    Smokeless tobacco: Never Used   Substance Use Topics    Alcohol use: Yes     Comment: occ       Depression Risk Factor Screening:     3 most recent PHQ Screens 8/27/2018   Little interest or pleasure in doing things Not at all   Feeling down, depressed, irritable, or hopeless Not at all   Total Score PHQ 2 0       Alcohol Risk Factor Screening:   Do you average 1 drink per night or more than 7 drinks a week:  No    On any one occasion in the past three months have you have had more than 3 drinks containing alcohol:  No      Functional Ability and Level of Safety:   Hearing: Hearing is good. Activities of Daily Living: The home contains: no safety equipment. Patient does total self care    Ambulation: with no difficulty    Fall Risk:  Fall Risk Assessment, last 12 mths 7/11/2018   Able to walk? Yes   Fall in past 12 months? No   Fall with injury? -   Number of falls in past 12 months -   Fall Risk Score -       Abuse Screen:  Patient is not abused    Cognitive Screening   Has your family/caregiver stated any concerns about your memory: no  Cognitive Screening: Normal - MMSE (Mini Mental Status Exam)    Patient Care Team   Patient Care Team:  Wale Mayo MD as PCP - Taryn Lin MD as PCP - [facility] Empaneled Provider    Assessment/Plan   Education and counseling provided:  Are appropriate based on today's review and evaluation    Diagnoses and all orders for this visit:    1. Diabetes mellitus without complication ([facility]- 0317  -     HEMOGLOBIN A1C WITH EAG  -      DIABETES FOOT EXAM  -     MICROALBUMIN, UR, RAND W/ MICROALB/CREAT RATIO    2. ACP (advance care planning)    3. Hyperlipidemia LDL goal <012  -     METABOLIC PANEL, COMPREHENSIVE  -     LIPID PANEL  -     pravastatin (PRAVACHOL) 20 mg tablet; Take 1 Tab by mouth daily. 4. Pure hypercholesterolemia    5. Essential hypertension  -     atenolol (TENORMIN) 50 mg tablet; Take 1 Tab by mouth daily.     6. Obesity (BMI 30-39.9)    7. Chronic seasonal allergic rhinitis due to pollen    8. Situational stress-elderly Mom in [place]    9. Gastroesophageal reflux disease without esophagitis    10. Seasonal allergic rhinitis due to pollen    11. History of motion sickness    12. Macular hole of right eye    13.  Incomplete rectal prolapse- asymptomatic-2015- found on GYN exam    Other orders  -     CVD REPORT        Health Maintenance Due   Topic Date Due    BREAST CANCER SCRN MAMMOGRAM  11/07/2018    MEDICARE YEARLY EXAM  07/12/2019    Influenza Age 5 to Adult  08/01/2019    GLAUCOMA SCREENING Q2Y  12/12/2019

## 2019-07-16 LAB
ALBUMIN SERPL-MCNC: 4.4 G/DL (ref 3.6–4.8)
ALBUMIN/GLOB SERPL: 2.2 {RATIO} (ref 1.2–2.2)
ALP SERPL-CCNC: 116 IU/L (ref 39–117)
ALT SERPL-CCNC: 17 IU/L (ref 0–32)
AST SERPL-CCNC: 18 IU/L (ref 0–40)
BILIRUB SERPL-MCNC: 0.6 MG/DL (ref 0–1.2)
BUN SERPL-MCNC: 14 MG/DL (ref 8–27)
BUN/CREAT SERPL: 15 (ref 12–28)
CALCIUM SERPL-MCNC: 9.5 MG/DL (ref 8.7–10.3)
CHLORIDE SERPL-SCNC: 103 MMOL/L (ref 96–106)
CHOLEST SERPL-MCNC: 205 MG/DL (ref 100–199)
CO2 SERPL-SCNC: 24 MMOL/L (ref 20–29)
CREAT SERPL-MCNC: 0.93 MG/DL (ref 0.57–1)
EST. AVERAGE GLUCOSE BLD GHB EST-MCNC: 111 MG/DL
GLOBULIN SER CALC-MCNC: 2 G/DL (ref 1.5–4.5)
GLUCOSE SERPL-MCNC: 116 MG/DL (ref 65–99)
HBA1C MFR BLD: 5.5 % (ref 4.8–5.6)
HDLC SERPL-MCNC: 79 MG/DL
INTERPRETATION, 910389: NORMAL
LDLC SERPL CALC-MCNC: 105 MG/DL (ref 0–99)
POTASSIUM SERPL-SCNC: 4.9 MMOL/L (ref 3.5–5.2)
PROT SERPL-MCNC: 6.4 G/DL (ref 6–8.5)
SODIUM SERPL-SCNC: 140 MMOL/L (ref 134–144)
TRIGL SERPL-MCNC: 107 MG/DL (ref 0–149)
VLDLC SERPL CALC-MCNC: 21 MG/DL (ref 5–40)

## 2019-08-06 NOTE — PROGRESS NOTES
GREAT NEWS!! All of your recent lab tests are normal or at goal.  No signs of active diabetes, normal liver and kidney tests. Goodt cholesterol numbers.      I would continue everything the same and we will see you at your next scheduled follow up in January

## 2019-11-24 NOTE — PROGRESS NOTES
Advance Care Planning (ACP) Provider Note - Comprehensive     Date of ACP Conversation: 11/24/19  Persons included in Conversation:  patient  Length of ACP Conversation in minutes:  <16 minutes (Non-Billable)    Authorized Decision Maker (if patient is incapable of making informed decisions): This person is:  Healthcare Agent/Medical Power of  under Advance Directive            General ACP for ALL Patients with Decision Making Capacity:   Importance of advance care planning, including choosing a healthcare agent to communicate patient's healthcare decisions if patient lost the ability to make decisions, such as after a sudden illness or accident  Understanding of the healthcare agent role was assessed and information provided    Review of Existing Advance Directive:  Does this advance directive still reflect your preferences?   Yes (Provide new form/Refer for assistance in updating)    For Serious or Chronic Illness:  No known illness    Interventions Provided:  Recommended completion of Advance Directive form after review of ACP materials and conversation with prospective healthcare agent   Recommended communicating the plan and making copies for the healthcare agent, personal physician, and others as appropriate (e.g., health system)  Recommended review of completed ACP document annually or upon change in health status

## 2019-11-24 NOTE — PATIENT INSTRUCTIONS
Medicare Wellness Visit, Female The best way to live healthy is to have a lifestyle where you eat a well-balanced diet, exercise regularly, limit alcohol use, and quit all forms of tobacco/nicotine, if applicable. Regular preventive services are another way to keep healthy. Preventive services (vaccines, screening tests, monitoring & exams) can help personalize your care plan, which helps you manage your own care. Screening tests can find health problems at the earliest stages, when they are easiest to treat. [facility] follows the current, evidence-based guidelines published by the [facility] ([facility]) when recommending preventive services for our patients. Because we follow these guidelines, sometimes recommendations change over time as research supports it. (For example, mammograms used to be recommended annually. Even though Medicare will still pay for an annual mammogram, the newer guidelines recommend a mammogram every two years for women of average risk). Of course, you and your doctor may decide to screen more often for some diseases, based on your risk and your co-morbidities (chronic disease you are already diagnosed with). Preventive services for you include: - Medicare offers their members a free annual wellness visit, which is time for you and your primary care provider to discuss and plan for your preventive service needs. Take advantage of this benefit every year! 
-All adults over the age of 72 should receive the recommended pneumonia vaccines. Current USPSTF guidelines recommend a series of two vaccines for the best pneumonia protection.  
-All adults should have a flu vaccine yearly and a tetanus vaccine every 10 years.  
-All adults age 48 and older should receive the shingles vaccines (series of two vaccines). -All adults age 38-68 who are overweight should have a diabetes screening test once every three years. -All adults born between [birth year] and 1966 should be screened once for Hepatitis C. 
-Other screening tests and preventive services for persons with diabetes include: an eye exam to screen for diabetic retinopathy, a kidney function test, a foot exam, and stricter control over your cholesterol.  
-Cardiovascular screening for adults with routine risk involves an electrocardiogram (ECG) at intervals determined by your doctor.  
-Colorectal cancer screenings should be done for adults age 54-65 with no increased risk factors for colorectal cancer. There are a number of acceptable methods of screening for this type of cancer. Each test has its own benefits and drawbacks. Discuss with your doctor what is most appropriate for you during your annual wellness visit. The different tests include: colonoscopy (considered the best screening method), a fecal occult blood test, a fecal DNA test, and sigmoidoscopy. 
 
-A bone mass density test is recommended when a woman turns 65 to screen for osteoporosis. This test is only recommended one time, as a screening. Some providers will use this same test as a disease monitoring tool if you already have osteoporosis. -Breast cancer screenings are recommended every other year for women of normal risk, age 54-69. 
-Cervical cancer screenings for women over age 72 are only recommended with certain risk factors. Here is a list of your current Health Maintenance items (your personalized list of preventive services) with a due date: 
Health Maintenance Due Topic Date Due  Mammogram  11/07/2018 [address] [place] [address] Annual Well Visit  07/12/2019  Flu Vaccine  08/01/2019  Glaucoma Screening   12/12/2019

## 2020-01-20 ENCOUNTER — OFFICE VISIT (OUTPATIENT)
Dept: FAMILY MEDICINE CLINIC | Age: 70
End: 2020-01-20

## 2020-01-20 ENCOUNTER — HOSPITAL ENCOUNTER (OUTPATIENT)
Dept: LAB | Age: 70
Discharge: HOME | End: 2020-01-20
Payer: MEDICARE

## 2020-01-20 VITALS
BODY MASS INDEX: 33.59 KG/M2 | OXYGEN SATURATION: 98 % | DIASTOLIC BLOOD PRESSURE: 74 MMHG | SYSTOLIC BLOOD PRESSURE: 120 MMHG | TEMPERATURE: 98.7 F | RESPIRATION RATE: 16 BRPM | HEIGHT: 66 IN | HEART RATE: 78 BPM | WEIGHT: 209 LBS

## 2020-01-20 DIAGNOSIS — E78.5 HYPERLIPIDEMIA LDL GOAL <100: Primary | ICD-10-CM

## 2020-01-20 DIAGNOSIS — J30.1 SEASONAL ALLERGIC RHINITIS DUE TO POLLEN: ICD-10-CM

## 2020-01-20 DIAGNOSIS — F43.9 SITUATIONAL STRESS: ICD-10-CM

## 2020-01-20 DIAGNOSIS — E66.9 OBESITY (BMI 30-39.9): ICD-10-CM

## 2020-01-20 DIAGNOSIS — H35.341 MACULAR HOLE OF RIGHT EYE: ICD-10-CM

## 2020-01-20 DIAGNOSIS — I10 ESSENTIAL HYPERTENSION: ICD-10-CM

## 2020-01-20 DIAGNOSIS — K21.9 GASTROESOPHAGEAL REFLUX DISEASE WITHOUT ESOPHAGITIS: ICD-10-CM

## 2020-01-20 DIAGNOSIS — E11.9 DIABETES MELLITUS WITHOUT COMPLICATION (HCC): ICD-10-CM

## 2020-01-20 PROCEDURE — 83036 HEMOGLOBIN GLYCOSYLATED A1C: CPT

## 2020-01-20 PROCEDURE — 80053 COMPREHEN METABOLIC PANEL: CPT

## 2020-01-20 PROCEDURE — 80061 LIPID PANEL: CPT

## 2020-01-20 PROCEDURE — 36415 COLL VENOUS BLD VENIPUNCTURE: CPT

## 2020-01-20 PROCEDURE — 82043 UR ALBUMIN QUANTITATIVE: CPT

## 2020-01-20 NOTE — PROGRESS NOTES
Chief Complaint   Patient presents with    Cholesterol Problem     fasting    1. Have you been to the ER, urgent care clinic since your last visit? Hospitalized since your last visit? No    2. Have you seen or consulted any other health care providers outside of the [address] [place] [address] since your last visit? Include any pap smears or colon screening.  No

## 2020-01-20 NOTE — PROGRESS NOTES
HISTORY OF PRESENT ILLNESS  HPI  Jeremy Lowery is a 78 y.o. female with a history of HTN, GERD, DM, calculus of kidney, dyspareunia, motion sickness, age-related cataracts and hyperlipidemia with LDL goal <100, who presents to the office today for a follow up on her HTN and cholesterol. Pt is fasting today. Pt notes her allergies are worse in the spring and fall. Pt denies history of asthma. Pt says she takes Pepcid every morning for her heartburn. Pt reports taking 1000 units vitamin D a day. Pt denies unusual SOB, chest pain, and any recent ER visits or hospitalizations. Past Medical History:   Diagnosis Date    Adverse effect of anesthesia     treats pt for motion sickness with a patch    AR (allergic rhinitis)     Calculus of kidney     Dyspareunia     GERD (gastroesophageal reflux disease) 11/30/2010    HBP (high blood pressure) 8/9/2010    History of motion sickness 3/29/2014    Hyperlipidemia LDL goal <100 7/11/2018    Incomplete rectal prolapse- asymptomatic-2015- found on GYN exam 8/6/2015    Obesity (BMI 30-39.9) 6/30/2013     Past Surgical History:   Procedure Laterality Date    COLONOSCOPY N/A 10/4/2017    COL[facility]COPY    HX CATARACT REMOVAL Right 08/2018    left 09/18    HX CHOLECYSTECTOMY  1987    HX COLONOSCOPY  2007    repeat in 2017    HX HEENT Right     macular hole repair on right eye    HX KNEE ARTHROSCOPY Left 02/15/2017    meniscus     Current Outpatient Medications on File Prior to Visit   Medication Sig Dispense Refill    omega 3-dha-epa-fish oil (FISH OIL) 100-160-1,000 mg cap Fish Oil      cholecalciferol (VITAMIN D3) 1,000 unit cap Vitamin D3      pravastatin (PRAVACHOL) 20 mg tablet Take 1 Tab by mouth daily. 90 Tab 1    atenolol (TENORMIN) 50 mg tablet Take 1 Tab by mouth daily. 90 Tab 1    famotidine (PEPCID) 20 mg tablet Take 20 mg by mouth daily.       multivitamin (ONE A DAY) tablet Take 1 Tab by mouth daily.  LACTOBACILLUS ACIDOPHILUS (PROBIOTIC PO) Take  by mouth daily.  acetaminophen (TYLENOL EXTRA STRENGTH) 500 mg tablet Take 2 Tabs by mouth every six (6) hours as needed for Pain.  cetirizine (ZYRTEC) 10 mg tablet Take 10 mg by mouth daily.  naproxen sodium (ALEVE) 220 mg tablet Take 220-440 mg by mouth two (2) times daily (with meals). Prn       No current facility-administered medications on file prior to visit. Allergies   Allergen Reactions    Doxycycline Rash    Sulfa (Sulfonamide Antibiotics) Rash    Tetracycline Rash     Family History   Problem Relation Age of Onset    Hypertension Mother    [facility] Elevated Lipids Mother     Arrhythmia Mother         a. fib    Alcohol abuse Father     Arrhythmia Paternal Grandmother 80        had a pacemaker    Heart Attack Paternal Grandfather 79    Cancer Other         melanoma in daughter     Social History     Socioeconomic History    Marital status:      Spouse name: Not on file    Number of children: Not on file    Years of education: Not on file    Highest education level: Not on file   Tobacco Use    Smoking status: Never Smoker    Smokeless tobacco: Never Used   Substance and Sexual Activity    Alcohol use: Yes     Comment: occ    Drug use: No    Sexual activity: Yes   Other Topics Concern     Service No    Blood Transfusions No    Caffeine Concern No    Occupational Exposure No    Hobby Hazards No    Sleep Concern No    Stress Concern No    Weight Concern Yes    Special Diet No    Back Care No    Exercise Yes     Comment: walking    Bike Helmet No     Comment: does not ride   [address] Yes    Self-Exams Yes             Review of Systems   Constitutional: Negative for chills, diaphoresis, fever, malaise/fatigue and weight loss. Eyes: Negative for blurred vision, double vision and pain. Respiratory: Negative for cough, shortness of breath and wheezing.     Cardiovascular: Negative for chest pain, palpitations, orthopnea, claudication, leg swelling and PND. Genitourinary: Negative for frequency. Skin: Negative for itching and rash. Neurological: Negative for dizziness, tingling, tremors, sensory change, speech change, focal weakness, seizures, loss of consciousness, weakness and headaches. Endo/Heme/Allergies: Negative for environmental allergies and polydipsia. Does not bruise/bleed easily. Results for orders placed or performed in visit on 01/20/20   LIPID PANEL   Result Value Ref Range    Cholesterol, total 179 100 - 199 mg/dL    Triglyceride 106 0 - 149 mg/dL    HDL Cholesterol 73 >39 mg/dL    VLDL, calculated 21 5 - 40 mg/dL    LDL, calculated 85 0 - 99 mg/dL   METABOLIC PANEL, COMPREHENSIVE   Result Value Ref Range    Glucose 109 (H) 65 - 99 mg/dL    BUN 13 8 - 27 mg/dL    Creatinine 0.99 0.57 - 1.00 mg/dL    GFR est non-AA 58 (L) >59 mL/min/1.73    GFR est AA 67 >59 mL/min/1.73    BUN/Creatinine ratio 13 12 - 28    Sodium 142 134 - 144 mmol/L    Potassium 4.1 3.5 - 5.2 mmol/L    Chloride 103 96 - 106 mmol/L    CO2 24 20 - 29 mmol/L    Calcium 9.9 8.7 - 10.3 mg/dL    Protein, total 6.4 6.0 - 8.5 g/dL    Albumin 4.2 3.8 - 4.8 g/dL    GLOBULIN, TOTAL 2.2 1.5 - 4.5 g/dL    A-G Ratio 1.9 1.2 - 2.2    Bilirubin, total 0.8 0.0 - 1.2 mg/dL    Alk.  phosphatase 110 39 - 117 IU/L    AST (SGOT) 16 0 - 40 IU/L    ALT (SGPT) 18 0 - 32 IU/L   MICROALBUMIN, UR, RAND W/ MICROALB/CREAT RATIO   Result Value Ref Range    Creatinine, urine 98.4 Not Estab. mg/dL    Microalbumin, urine 6.1 Not Estab. ug/mL    Microalb/Creat ratio (ug/mg creat.) 6 0 - 29 mg/g creat   CVD REPORT   Result Value Ref Range    INTERPRETATION Note     PDF IMAGE Not applicable    CKD REPORT   Result Value Ref Range    Interpretation Note    HEMOGLOBIN A1C WITH EAG   Result Value Ref Range    Hemoglobin A1c 5.8 (H) 4.8 - 5.6 %    Estimated average glucose 120 mg/dL         Physical Exam  Visit Vitals  /74   Pulse 78 Temp 98.7 °F (37.1 °C)   Resp 16   Ht 5' 5.5\" (1.664 m)   Wt 209 lb (94.8 kg)   SpO2 98%   BMI 34.25 kg/m²       Head: Normocephalic, without obvious abnormality, atraumatic  Eyes: conjunctivae/corneas clear. PERRL, EOM's intact. Neck: supple, symmetrical, trachea midline, no adenopathy, thyroid: not enlarged, symmetric, no tenderness/mass/nodules, no carotid bruit and no JVD  Lungs: clear to auscultation bilaterally  Heart: regular rate and rhythm, S1, S2 normal, no murmur, click, rub or gallop  Extremities: extremities normal, atraumatic, no cyanosis or edema  Pulses: 2+ and symmetric  Lymph nodes: Cervical, supraclavicular, and axillary nodes normal.  Neurologic: Grossly normal      ASSESSMENT and PLAN    ICD-10-CM ICD-9-CM    1. Hyperlipidemia LDL goal <100 E78.5 272.4 LIPID PANEL      METABOLIC PANEL, COMPREHENSIVE   2. Diabetes mellitus without complication ([facility] [address]- 4219 E11.9 250.00 HEMOGLOBIN A1C WITH EAG      MICROALBUMIN, UR, RAND W/ MICROALB/CREAT RATIO   3. Essential hypertension I10 401.9    4. Gastroesophageal reflux disease without esophagitis K21.9 530.81    5. Obesity (BMI 30-39. 9) E66.9 278.00    6. Seasonal allergic rhinitis due to pollen J30.1 477.0    7. Situational stress-elderly Mom in [place] F43.9 V62.89    8. Macular hole of right eye H35.341 362.54      Diagnoses and all orders for this visit:    1. Hyperlipidemia LDL goal <100  -     LIPID PANEL  -     METABOLIC PANEL, COMPREHENSIVE    2. Diabetes mellitus without complication ([facility] [address]- 5800  -     HEMOGLOBIN A1C WITH EAG  -     MICROALBUMIN, UR, RAND W/ MICROALB/CREAT RATIO    3. Essential hypertension    4. Gastroesophageal reflux disease without esophagitis    5. Obesity (BMI 30-39.9)    6. Seasonal allergic rhinitis due to pollen    7. Situational stress-elderly Mom in [facility]    8.  Macular hole of right eye    Other orders  -     MICROALBUMIN, UR, RAND W/ MICROALB/CREAT RATIO  -     CVD REPORT  -     CKD REPORT  -     HEMOGLOBIN A1C WITH EAG      Follow-up and Dispositions    · Return in about 6 months (around 7/20/2020) for F/U cholesterol, f/u Vitamin D deficiency, GERD, F/U of obesity. lab results and schedule of future lab studies reviewed with patient  reviewed diet, exercise and weight control  cardiovascular risk and specific lipid/LDL goals reviewed  reviewed medications and side effects in detail  Please call my office if there are any questions- [phone]. I will arrange for follow up after the lab tests done from today return  Recommended a weekly \"heart check. \" I went into detail how to do this. Call for refills on medications as needed. Discussed expected course/resolution/complications of diagnosis in detail with patient. Medication risks/benefits/costs/interactions/alternatives discussed with patient. Pt was given an after visit summary which includes diagnoses, current medications & vitals. Pt expressed understanding with the diagnosis and plan    Total 25 minutes,60 % counseling re:  Regular exercise is very important to your health; it helps mentally, physically, socially; it prevents injuries if done properly. Exercise, even as simple as walking 20-30 minutes daily has major benefits to your health even though your \"numbers\" are the same in the lab. See if you can add this into your daily regimen and after a few months it will become a regular habit-\"just something you do,\" like brushing your teeth. A combination of aerobic exercise and strengthening and stretching is felt to be the best for you, so this should be your ultimate goal.   This can be done in the privacy of your home or in a group setting as at the gym  Some prefer having a , others prefer to do exercise in groups or individually. Do what \"works\" for you. You need to make it simple and \"fun,\" or you most likely will not continue it.     Review of  the proper technique of checking the blood pressure- check it on an average day only, not on a stressful day, sitting, no exercise for at least 1 hour and not experiencing any new pain( chronic pain is OK). Patient encouraged to check BP sitting and standing at least once a month and to report these readings to me if > 140/ 90 on average , or if the standing BP is >  15 points lower than the sitting. Always check it twice and if there is > 5 points decrease from the previous reading( top reading or systolic) keep checking it until it does not drop 5 points. Write only this final reading down, not the preceding readings. If out of these readings there is only 1 out of 4 or less > 284, or > 90 diastolic then your blood pressure is OK; it needs further treatment if it is above this. Also, don't forget,  as noted above, to check your blood pressure standing once a month; this is to detect a drop in your BP that might lead to fainting and serious injury; you check it standing with your arm hanging straight down and relaxed. Check it twice waiting 1 minute between the two readings. If, with either one of these 2 readings there is a > 15 point drop of the systolic compared to your sitting pressure( done before the standing BP), then let me know. Following these guidelines, continue to check your BP and write down only the ones described above and it will help me to effectively treat your blood pressure. Reviewed symptoms, or lack thereof, of hypertension and elevated cholesterol. Recommended a weekly \"heart check. \" I went into detail how to do this. Also, discussed symptoms of concern that were noted today in the note above, treatment options( including doing nothing), when to follow up before recommended time frame. Also, answered all questions. BMI is significantly elevated- in the obese range. I reviewed diet, exercise and weight control.  Discussed weight control in detail, the importance of mainly decreased carbs, and for weight maintenance, exercise; discussed different diets and that it isn't as important to watch the type of foods as it is to decrease calorie intake no matter what type of diet you do, etc.     If good Vit D level, recheck yearly and continue same Vit D intake lifelong. Congratulated her on her weight loss and attempts to lose further weight. Talked about reflux and the importance of diet changes, and if need be medication. Unless it becomes more severe, Pepcid once or twice a day is what I would recommend. This document was written by Ankit Yanez, as dictated by Twyla Rizvi MD.  I have reviewed and agree with the above note and have made corrections where appropriate Jagdeep Clement M.D.

## 2020-01-21 LAB
ALBUMIN SERPL-MCNC: 4.2 G/DL (ref 3.8–4.8)
ALBUMIN/CREAT UR: 6 MG/G CREAT (ref 0–29)
ALBUMIN/GLOB SERPL: 1.9 {RATIO} (ref 1.2–2.2)
ALP SERPL-CCNC: 110 IU/L (ref 39–117)
ALT SERPL-CCNC: 18 IU/L (ref 0–32)
AST SERPL-CCNC: 16 IU/L (ref 0–40)
BILIRUB SERPL-MCNC: 0.8 MG/DL (ref 0–1.2)
BUN SERPL-MCNC: 13 MG/DL (ref 8–27)
BUN/CREAT SERPL: 13 (ref 12–28)
CALCIUM SERPL-MCNC: 9.9 MG/DL (ref 8.7–10.3)
CHLORIDE SERPL-SCNC: 103 MMOL/L (ref 96–106)
CHOLEST SERPL-MCNC: 179 MG/DL (ref 100–199)
CO2 SERPL-SCNC: 24 MMOL/L (ref 20–29)
CREAT SERPL-MCNC: 0.99 MG/DL (ref 0.57–1)
CREAT UR-MCNC: 98.4 MG/DL
EST. AVERAGE GLUCOSE BLD GHB EST-MCNC: 120 MG/DL
GLOBULIN SER CALC-MCNC: 2.2 G/DL (ref 1.5–4.5)
GLUCOSE SERPL-MCNC: 109 MG/DL (ref 65–99)
HBA1C MFR BLD: 5.8 % (ref 4.8–5.6)
HDLC SERPL-MCNC: 73 MG/DL
INTERPRETATION, 910389: NORMAL
INTERPRETATION: NORMAL
LDLC SERPL CALC-MCNC: 85 MG/DL (ref 0–99)
MICROALBUMIN UR-MCNC: 6.1 UG/ML
PDF IMAGE, 910387: NORMAL
POTASSIUM SERPL-SCNC: 4.1 MMOL/L (ref 3.5–5.2)
PROT SERPL-MCNC: 6.4 G/DL (ref 6–8.5)
SODIUM SERPL-SCNC: 142 MMOL/L (ref 134–144)
TRIGL SERPL-MCNC: 106 MG/DL (ref 0–149)
VLDLC SERPL CALC-MCNC: 21 MG/DL (ref 5–40)

## 2020-03-01 DIAGNOSIS — E78.5 HYPERLIPIDEMIA LDL GOAL <100: ICD-10-CM

## 2020-03-01 DIAGNOSIS — I10 ESSENTIAL HYPERTENSION: ICD-10-CM

## 2020-03-01 RX ORDER — ATENOLOL 50 MG/1
TABLET ORAL
Qty: 90 TAB | Refills: 1 | Status: SHIPPED | OUTPATIENT
Start: 2020-03-01 | End: 2020-07-24

## 2020-03-01 RX ORDER — PRAVASTATIN SODIUM 20 MG/1
TABLET ORAL
Qty: 90 TAB | Refills: 1 | Status: SHIPPED | OUTPATIENT
Start: 2020-03-01 | End: 2020-07-24

## 2020-07-23 DIAGNOSIS — E78.5 HYPERLIPIDEMIA LDL GOAL <100: ICD-10-CM

## 2020-07-23 DIAGNOSIS — I10 ESSENTIAL HYPERTENSION: ICD-10-CM

## 2020-07-24 RX ORDER — ATENOLOL 50 MG/1
TABLET ORAL
Qty: 90 TAB | Refills: 1 | Status: SHIPPED | OUTPATIENT
Start: 2020-07-24 | End: 2020-12-02 | Stop reason: SDUPTHER

## 2020-07-24 RX ORDER — PRAVASTATIN SODIUM 20 MG/1
TABLET ORAL
Qty: 90 TAB | Refills: 0 | Status: SHIPPED | OUTPATIENT
Start: 2020-07-24 | End: 2020-12-02 | Stop reason: SDUPTHER

## 2020-10-17 DIAGNOSIS — E78.5 HYPERLIPIDEMIA LDL GOAL <100: ICD-10-CM

## 2020-11-23 RX ORDER — PRAVASTATIN SODIUM 20 MG/1
TABLET ORAL
Qty: 90 TAB | Refills: 3 | OUTPATIENT
Start: 2020-11-23

## 2020-12-02 ENCOUNTER — OFFICE VISIT (OUTPATIENT)
Dept: FAMILY MEDICINE CLINIC | Age: 70
End: 2020-12-02
Payer: MEDICARE

## 2020-12-02 VITALS
HEART RATE: 77 BPM | BODY MASS INDEX: 36 KG/M2 | OXYGEN SATURATION: 98 % | TEMPERATURE: 97.8 F | HEIGHT: 66 IN | DIASTOLIC BLOOD PRESSURE: 72 MMHG | RESPIRATION RATE: 17 BRPM | WEIGHT: 224 LBS | SYSTOLIC BLOOD PRESSURE: 136 MMHG

## 2020-12-02 DIAGNOSIS — E11.9 DIABETES MELLITUS WITHOUT COMPLICATION (HCC): ICD-10-CM

## 2020-12-02 DIAGNOSIS — E66.01 SEVERE OBESITY (HCC): ICD-10-CM

## 2020-12-02 DIAGNOSIS — E78.5 HYPERLIPIDEMIA LDL GOAL <100: ICD-10-CM

## 2020-12-02 DIAGNOSIS — K21.9 GASTROESOPHAGEAL REFLUX DISEASE WITHOUT ESOPHAGITIS: ICD-10-CM

## 2020-12-02 DIAGNOSIS — Z00.00 MEDICARE ANNUAL WELLNESS VISIT, SUBSEQUENT: ICD-10-CM

## 2020-12-02 DIAGNOSIS — I10 ESSENTIAL HYPERTENSION: ICD-10-CM

## 2020-12-02 DIAGNOSIS — Z71.89 ACP (ADVANCE CARE PLANNING): ICD-10-CM

## 2020-12-02 DIAGNOSIS — M79.622 LEFT UPPER ARM PAIN: Primary | ICD-10-CM

## 2020-12-02 DIAGNOSIS — F43.9 SITUATIONAL STRESS: ICD-10-CM

## 2020-12-02 DIAGNOSIS — J30.1 SEASONAL ALLERGIC RHINITIS DUE TO POLLEN: ICD-10-CM

## 2020-12-02 PROCEDURE — G8510 SCR DEP NEG, NO PLAN REQD: HCPCS | Performed by: FAMILY MEDICINE

## 2020-12-02 PROCEDURE — G8427 DOCREV CUR MEDS BY ELIG CLIN: HCPCS | Performed by: FAMILY MEDICINE

## 2020-12-02 PROCEDURE — 3044F HG A1C LEVEL LT 7.0%: CPT | Performed by: FAMILY MEDICINE

## 2020-12-02 PROCEDURE — G8754 DIAS BP LESS 90: HCPCS | Performed by: FAMILY MEDICINE

## 2020-12-02 PROCEDURE — G8536 NO DOC ELDER MAL SCRN: HCPCS | Performed by: FAMILY MEDICINE

## 2020-12-02 PROCEDURE — 1101F PT FALLS ASSESS-DOCD LE1/YR: CPT | Performed by: FAMILY MEDICINE

## 2020-12-02 PROCEDURE — 2022F DILAT RTA XM EVC RTNOPTHY: CPT | Performed by: FAMILY MEDICINE

## 2020-12-02 PROCEDURE — 1090F PRES/ABSN URINE INCON ASSESS: CPT | Performed by: FAMILY MEDICINE

## 2020-12-02 PROCEDURE — 99215 OFFICE O/P EST HI 40 MIN: CPT | Performed by: FAMILY MEDICINE

## 2020-12-02 PROCEDURE — G8399 PT W/DXA RESULTS DOCUMENT: HCPCS | Performed by: FAMILY MEDICINE

## 2020-12-02 PROCEDURE — G8417 CALC BMI ABV UP PARAM F/U: HCPCS | Performed by: FAMILY MEDICINE

## 2020-12-02 PROCEDURE — 3017F COLORECTAL CA SCREEN DOC REV: CPT | Performed by: FAMILY MEDICINE

## 2020-12-02 PROCEDURE — G8752 SYS BP LESS 140: HCPCS | Performed by: FAMILY MEDICINE

## 2020-12-02 PROCEDURE — G0463 HOSPITAL OUTPT CLINIC VISIT: HCPCS | Performed by: FAMILY MEDICINE

## 2020-12-02 RX ORDER — PRAVASTATIN SODIUM 20 MG/1
TABLET ORAL
Qty: 90 TAB | Refills: 1 | Status: SHIPPED | OUTPATIENT
Start: 2020-12-02 | End: 2021-04-14

## 2020-12-02 RX ORDER — ATENOLOL 50 MG/1
TABLET ORAL
Qty: 90 TAB | Refills: 1 | Status: SHIPPED | OUTPATIENT
Start: 2020-12-02 | End: 2021-04-14

## 2020-12-02 NOTE — PROGRESS NOTES
Chief Complaint   Patient presents with    Hypertension    Cholesterol Problem    Arm Pain     left upper arm   1. Have you been to the ER, urgent care clinic since your last visit? Hospitalized since your last visit? No    2. Have you seen or consulted any other health care providers outside of the [address] [place] [address] since your last visit? Include any pap smears or colon screening.  No   appt mammo in jan  Done eye exam

## 2020-12-02 NOTE — PROGRESS NOTES
HISTORY OF PRESENT ILLNESS  HPI  Yoselin Lopez is a 69 y. o. female with a history of HTN, GERD, resolved DM( due to previous weight loss), calculus of kidney, dyspareunia, motion sickness, age-related cataracts and hyperlipidemia with LDL goal <100, who presents to the office today c/o left arm pain and for follow up on her HTN, cholesterol and these health problems. Pt points to her left upper arm, between her shoulder and elbow, as the area of pain onset 6 months ago. Pt mentions that a few weeks ago it seem her discomfort improved but it still persists. She adds that pain is aggravated by movement. She says that pain occurs at anytime of the day whether first thing in the morning, midday, or at night. Pt admits that she tried Voltaren, Tylenol, and Aleve with little relief. Pt notes that she has gained weight. She attributes this to being inside due to the pandemic. She checks BP outside of the office with an average BP of 130/70. Pt recalls that her BP earlier today was 130/80 when she was sitting and 120/72 when she was standing. Pt states that her son has an enlarged aorta and wonders if she is at risk for this as well. She admits that she is unsure if her son's aorta is enlarged near the heart or in the abd. .    She inquires about the COVID-19 vaccine. Pt asserts that her baseline heart rate is 55. Pt denies unusual SOB, chest pain, and any recent ER visits or hospitalizations.          Past Medical History:   Diagnosis Date    Adverse effect of anesthesia     treats pt for motion sickness with a patch    AR (allergic rhinitis)     Calculus of kidney     Dyspareunia     GERD (gastroesophageal reflux disease) 11/30/2010    HBP (high blood pressure) 8/9/2010    History of motion sickness 3/29/2014    Hyperlipidemia LDL goal <100 7/11/2018    Incomplete rectal prolapse- asymptomatic-2015- found on GYN exam 8/6/2015    Obesity (BMI 30-39.9) 6/30/2013     Past Surgical History: Procedure Laterality Date    COLONOSCOPY N/A 10/4/2017    COLONOSCOPY performed by Gracia [facility]and MD at [facility] ENDOSCOPY    HX CATARACT REMOVAL Right 08/2018    left 09/18    HX CHOLECYSTECTOMY  1987    HX COLONOSCOPY  2007    repeat in 2017    HX HEENT Right     macular hole repair on right eye    HX KNEE ARTHROSCOPY Left 02/15/2017    meniscus     Current Outpatient Medications on File Prior to Visit   Medication Sig Dispense Refill    omega 3-dha-epa-fish oil (FISH OIL) 100-160-1,000 mg cap Fish Oil      cholecalciferol (VITAMIN D3) 1,000 unit cap Vitamin D3      cetirizine (ZYRTEC) 10 mg tablet Take 10 mg by mouth daily.  famotidine (PEPCID) 20 mg tablet Take 20 mg by mouth daily.  multivitamin (ONE A DAY) tablet Take 1 Tab by mouth daily.  LACTOBACILLUS ACIDOPHILUS (PROBIOTIC PO) Take  by mouth daily.  naproxen sodium (ALEVE) 220 mg tablet Take 220-440 mg by mouth two (2) times daily (with meals). Prn      acetaminophen (TYLENOL EXTRA STRENGTH) 500 mg tablet Take 2 Tabs by mouth every six (6) hours as needed for Pain. No current facility-administered medications on file prior to visit.       Allergies   Allergen Reactions    Doxycycline Rash    Sulfa (Sulfonamide Antibiotics) Rash    Tetracycline Rash     Family History   Problem Relation Age of Onset    Hypertension Mother    [facility] Elevated Lipids Mother     Arrhythmia Mother         a. fib    Alcohol abuse Father     Arrhythmia Paternal Grandmother 80        had a pacemaker    Heart Attack Paternal Grandfather 79    Cancer Other         melanoma in daughter     Social History     Socioeconomic History    Marital status:      Spouse name: Not on file    Number of children: Not on file    Years of education: Not on file    Highest education level: Not on file   Tobacco Use    Smoking status: Never Smoker    Smokeless tobacco: Never Used   Substance and Sexual Activity    Alcohol use: Yes     Comment: occ  Drug use: No    Sexual activity: Yes   Other Topics Concern     Service No    Blood Transfusions No    Caffeine Concern No    Occupational Exposure No    Hobby Hazards No    Sleep Concern No    Stress Concern No    Weight Concern Yes    Special Diet No    Back Care No    Exercise Yes     Comment: walking    Bike Helmet No     Comment: does not ride   [address] Yes    Self-Exams Yes             Review of Systems   Constitutional: Negative for chills, diaphoresis, fever, malaise/fatigue and weight loss. Eyes: Negative for blurred vision, double vision, pain and redness. Respiratory: Negative for cough, shortness of breath and wheezing. Cardiovascular: Negative for chest pain, palpitations, orthopnea, claudication, leg swelling and PND. Genitourinary: Negative for frequency. Skin: Negative for itching and rash. Neurological: Negative for dizziness, tingling, tremors, sensory change, speech change, focal weakness, seizures, loss of consciousness, weakness and headaches. Endo/Heme/Allergies: Negative for environmental allergies and polydipsia. Does not bruise/bleed easily.      Results for orders placed or performed in visit on 01/20/20   LIPID PANEL   Result Value Ref Range    Cholesterol, total 179 100 - 199 mg/dL    Triglyceride 106 0 - 149 mg/dL    HDL Cholesterol 73 >39 mg/dL    VLDL, calculated 21 5 - 40 mg/dL    LDL, calculated 85 0 - 99 mg/dL   METABOLIC PANEL, COMPREHENSIVE   Result Value Ref Range    Glucose 109 (H) 65 - 99 mg/dL    BUN 13 8 - 27 mg/dL    Creatinine 0.99 0.57 - 1.00 mg/dL    GFR est non-AA 58 (L) >59 mL/min/1.73    GFR est AA 67 >59 mL/min/1.73    BUN/Creatinine ratio 13 12 - 28    Sodium 142 134 - 144 mmol/L    Potassium 4.1 3.5 - 5.2 mmol/L    Chloride 103 96 - 106 mmol/L    CO2 24 20 - 29 mmol/L    Calcium 9.9 8.7 - 10.3 mg/dL    Protein, total 6.4 6.0 - 8.5 g/dL    Albumin 4.2 3.8 - 4.8 g/dL    GLOBULIN, TOTAL 2.2 1.5 - 4.5 g/dL    A-G Ratio 1.9 1.2 - 2.2    Bilirubin, total 0.8 0.0 - 1.2 mg/dL    Alk. phosphatase 110 39 - 117 IU/L    AST (SGOT) 16 0 - 40 IU/L    ALT (SGPT) 18 0 - 32 IU/L   MICROALBUMIN, UR, RAND W/ MICROALB/CREAT RATIO   Result Value Ref Range    Creatinine, urine 98.4 Not Estab. mg/dL    Microalbumin, urine 6.1 Not Estab. ug/mL    Microalb/Creat ratio (ug/mg creat.) 6 0 - 29 mg/g creat   CVD REPORT   Result Value Ref Range    INTERPRETATION Note     PDF IMAGE Not applicable    CKD REPORT   Result Value Ref Range    Interpretation Note    HEMOGLOBIN A1C WITH EAG   Result Value Ref Range    Hemoglobin A1c 5.8 (H) 4.8 - 5.6 %    Estimated average glucose 120 mg/dL             Physical Exam  Visit Vitals  /72   Pulse 77   Temp 97.8 °F (36.6 °C)   Resp 17   Ht 5' 5.5\" (1.664 m)   Wt 224 lb (101.6 kg)   SpO2 98%   BMI 36.71 kg/m²         Head: Normocephalic, without obvious abnormality, atraumatic  Eyes: conjunctivae/corneas clear. PERRL, EOM's intact. Neck: supple, symmetrical, trachea midline, no adenopathy, thyroid: not enlarged, symmetric, no tenderness/mass/nodules, no carotid bruit and no JVD  Lungs: clear to auscultation bilaterally  Heart: regular rate and rhythm, S1, S2 normal, no murmur, click, rub or gallop  Extremities: extremities normal, atraumatic, no cyanosis or edema  Left arm: she has fairly good ROM in all directions but it is decreases a little bit. She has the most pain when she does the abduction. She does have some mild discomfort with the rotator cuff as well. No dis with the bicep tendon or the AC joint. Pulses: 2+ and symmetric  Lymph nodes: Cervical, supraclavicular, and axillary nodes normal.  Neurologic: Grossly normal          ASSESSMENT and PLAN    ICD-10-CM ICD-9-CM    1. Left upper arm pain  M79.622 729.5    2. Hyperlipidemia LDL goal <100  E78.5 272.4 pravastatin (PRAVACHOL) 20 mg tablet   3. Essential hypertension  I10 401.9 atenoloL (TENORMIN) 50 mg tablet   4. Severe obesity ([address]  E66.01 278.01    5. Gastroesophageal reflux disease without esophagitis  K21.9 530.81    6. Diabetes mellitus without complication ([facility]- 9305  E11.9 250.00    7. Situational stress-elderly Mom in [place]  F43.9 V62.89    8. Seasonal allergic rhinitis due to pollen  J30.1 477.0    9. ACP (advance care planning)  Z71.89 V65.49    10. Medicare annual wellness visit, subsequent  Z00.00 V70.0      Diagnoses and all orders for this visit:    1. Left upper arm pain    2. Hyperlipidemia LDL goal <100  -     pravastatin (PRAVACHOL) 20 mg tablet; TAKE 1 TABLET BY MOUTH  DAILY    3. Essential hypertension  -     atenoloL (TENORMIN) 50 mg tablet; TAKE 1 TABLET BY MOUTH  DAILY    4. Severe obesity ([facility] [address]    5. Gastroesophageal reflux disease without esophagitis    6. Diabetes mellitus without complication ([facility]- 4864    7. Situational stress-elderly Mom in [place]    8. Seasonal allergic rhinitis due to pollen    9. ACP (advance care planning)    10. Medicare annual wellness visit, subsequent      Follow-up and Dispositions    · Return in about 5 months (around 5/2/2021), or if symptoms worsen or fail to improve, for Annual Wellness Visit, F/U HTN and CHOL, and resolved DM( but with recent weight gain. lab results and schedule of future lab studies reviewed with patient  reviewed diet, exercise and weight control  cardiovascular risk and specific lipid/LDL goals reviewed  reviewed medications and side effects in detail  Please call my office if there are any questions- [phone]. I will arrange for follow up after the lab tests done from today return  Recommended a weekly \"heart check. \" I went into detail how to do this. Call for refills on medications as needed. Discussed expected course/resolution/complications of diagnosis in detail with patient. Medication risks/benefits/costs/interactions/alternatives discussed with patient. Pt was given an after visit summary which includes diagnoses, current medications & vitals.    Pt expressed understanding with the diagnosis and plan      BMI is significantly elevated- in the obese range. I reviewed diet, exercise and weight control. Discussed weight control in detail, the importance of mainly decreased carbs, and for weight maintenance, exercise; discussed different diets and that it isn't as important to watch the type of foods as it is to decrease calorie intake no matter what type of diet you do, etc.       Total 45 minutes,60 % counseling re: Recommended a weekly \"heart check. \" I went into detail how to do this. Long discussion about her weight and relationship to diabetes, etc     Regular exercise is very important to your health; it helps mentally, physically, socially; it prevents injuries if done properly. Exercise, even as simple as walking 20-30 minutes daily has major benefits to your health even though your \"numbers\" are the same in the lab. See if you can add this into your daily regimen and after a few months it will become a regular habit-\"just something you do,\" like brushing your teeth. A combination of aerobic exercise and strengthening and stretching is felt to be the best for you, so this should be your ultimate goal.   This can be done in the privacy of your home or in a group setting as at the gym  Some prefer having a , others prefer to do exercise in groups or individually. Do what \"works\" for you. You need to make it simple and \"fun,\" or you most likely will not continue it. Reviewed BP, cholesterol and diabetic goals. LDL goal<100,HDL goal>45, Triglyceride goal<150, A1C< 7.0, BP<140/90.      Discussed specific diabetic recommendations: low cholesterol diet, weight control and daily exercise discussed, home glucose monitoring emphasized, all medications, side effects and compliance discussed carefully, foot care discussed and Podiatry visits discussed, annual eye examinations at Ophthalmology discussed, glycohemoglobin and other lab monitoring discussed and long term diabetic complications discussed. Reviewed symptoms, or lack thereof, of hypertension and elevated cholesterol. Review of  the proper technique of checking the blood pressure- check it on an average day only, not on a stressful day, sitting, no exercise for at least 1 hour and not experiencing any new pain( chronic pain is OK). Patient encouraged to check BP sitting and standing at least once a month and to report these readings to me if > 140/ 90 on average , or if the standing BP is >  15 points lower than the sitting. Always check it twice and if there is > 5 points decrease from the previous reading( top reading or systolic) keep checking it until it does not drop 5 points. Write only this final reading down, not the preceding readings. If out of these readings there is only 1 out of 4 or less > 127, or > 90 diastolic then your blood pressure is OK; it needs further treatment if it is above this. Also, don't forget,  as noted above, to check your blood pressure standing once a month; this is to detect a drop in your BP that might lead to fainting and serious injury; you check it standing with your arm hanging straight down and relaxed. Check it twice waiting 1 minute between the two readings. If, with either one of these 2 readings there is a > 15 point drop of the systolic compared to your sitting pressure( done before the standing BP), then let me know. Following these guidelines, continue to check your BP and write down only the ones described above and it will help me to effectively treat your blood pressure. Also, discussed symptoms of concern that were noted today in the note above, treatment options( including doing nothing), when to follow up before recommended time frame. Also, answered all questions. I recommended ROM exercise for her left shoulder and to use ice if it starts to hurt after that.  Reviewed and demonstrated 4 separate shoulder exercises that should be done BID doing each one 3 times; should follow up if not doing better each week as far as less pain and/ or improved range of motion. She may try NSAID or Tylenol, preferably the latter which is safer. We had a long discussion about her weight and I suggested she look into different types of diets like Naturally Slim    This document was written by lAli Quijano, as dictated by Luisito Chiu MD.  I have reviewed and agree with the above note and have made corrections where appropriate Jagdeep Allen M.D.

## 2020-12-07 ENCOUNTER — LAB ONLY (OUTPATIENT)
Dept: FAMILY MEDICINE CLINIC | Age: 70
End: 2020-12-07
Payer: MEDICARE

## 2020-12-07 DIAGNOSIS — E11.9 DIABETES MELLITUS WITHOUT COMPLICATION (HCC): Primary | ICD-10-CM

## 2020-12-07 DIAGNOSIS — E78.5 HYPERLIPIDEMIA LDL GOAL <100: ICD-10-CM

## 2020-12-07 DIAGNOSIS — Z23 ENCOUNTER FOR IMMUNIZATION: ICD-10-CM

## 2020-12-07 DIAGNOSIS — S61.211A LACERATION OF LEFT INDEX FINGER WITHOUT FOREIGN BODY, NAIL DAMAGE STATUS UNSPECIFIED, INITIAL ENCOUNTER: ICD-10-CM

## 2020-12-07 LAB
ALBUMIN SERPL-MCNC: 3.7 G/DL (ref 3.5–5)
ALBUMIN/GLOB SERPL: 1.5 {RATIO} (ref 1.1–2.2)
ALP SERPL-CCNC: 105 U/L (ref 45–117)
ALT SERPL-CCNC: 30 U/L (ref 12–78)
ANION GAP SERPL CALC-SCNC: 5 MMOL/L (ref 5–15)
AST SERPL-CCNC: 24 U/L (ref 15–37)
BILIRUB SERPL-MCNC: 0.6 MG/DL (ref 0.2–1)
BUN SERPL-MCNC: 11 MG/DL (ref 6–20)
BUN/CREAT SERPL: 14 (ref 12–20)
CALCIUM SERPL-MCNC: 8.9 MG/DL (ref 8.5–10.1)
CHLORIDE SERPL-SCNC: 108 MMOL/L (ref 97–108)
CHOLEST SERPL-MCNC: 175 MG/DL
CO2 SERPL-SCNC: 29 MMOL/L (ref 21–32)
CREAT SERPL-MCNC: 0.78 MG/DL (ref 0.55–1.02)
EST. AVERAGE GLUCOSE BLD GHB EST-MCNC: 103 MG/DL
GLOBULIN SER CALC-MCNC: 2.5 G/DL (ref 2–4)
GLUCOSE SERPL-MCNC: 112 MG/DL (ref 65–100)
HBA1C MFR BLD: 5.2 % (ref 4–5.6)
HDLC SERPL-MCNC: 72 MG/DL
HDLC SERPL: 2.4 {RATIO} (ref 0–5)
LDLC SERPL CALC-MCNC: 71.6 MG/DL (ref 0–100)
LIPID PROFILE,FLP: ABNORMAL
POTASSIUM SERPL-SCNC: 4.2 MMOL/L (ref 3.5–5.1)
PROT SERPL-MCNC: 6.2 G/DL (ref 6.4–8.2)
SODIUM SERPL-SCNC: 142 MMOL/L (ref 136–145)
TRIGL SERPL-MCNC: 157 MG/DL (ref ?–150)
VLDLC SERPL CALC-MCNC: 31.4 MG/DL

## 2020-12-07 PROCEDURE — 90471 IMMUNIZATION ADMIN: CPT | Performed by: FAMILY MEDICINE

## 2020-12-07 PROCEDURE — 90714 TD VACC NO PRESV 7 YRS+ IM: CPT | Performed by: FAMILY MEDICINE

## 2020-12-07 PROCEDURE — 90471 IMMUNIZATION ADMIN: CPT

## 2021-01-29 ENCOUNTER — IMMUNIZATION (OUTPATIENT)
Dept: INTERNAL MEDICINE CLINIC | Age: 71
End: 2021-01-29
Payer: MEDICARE

## 2021-01-29 DIAGNOSIS — Z23 ENCOUNTER FOR IMMUNIZATION: Primary | ICD-10-CM

## 2021-01-29 PROCEDURE — 91301 COVID-19, MRNA, LNP-S, PF, 100MCG/0.5ML DOSE(MODERNA): CPT | Performed by: FAMILY MEDICINE

## 2021-01-29 PROCEDURE — 0011A COVID-19, MRNA, LNP-S, PF, 100MCG/0.5ML DOSE(MODERNA): CPT | Performed by: FAMILY MEDICINE

## 2021-02-22 RX ORDER — SCOPALAMINE 1 MG/3D
PATCH, EXTENDED RELEASE TRANSDERMAL
Qty: 30 PATCH | Refills: 1 | Status: SHIPPED | OUTPATIENT
Start: 2021-02-22 | End: 2021-05-12 | Stop reason: ALTCHOICE

## 2021-02-22 NOTE — TELEPHONE ENCOUNTER
Motion sickness on airplanes. We hope to start traveling to see our family in [place] and [place] spring or summer. Pills do not work for air travel or boats, just cars. Generic is fine. We get our second vaccine shot this week. Thank you.  [place]

## 2021-02-26 ENCOUNTER — IMMUNIZATION (OUTPATIENT)
Dept: INTERNAL MEDICINE CLINIC | Age: 71
End: 2021-02-26
Payer: MEDICARE

## 2021-02-26 DIAGNOSIS — Z23 ENCOUNTER FOR IMMUNIZATION: Primary | ICD-10-CM

## 2021-02-26 PROCEDURE — 0012A COVID-19, MRNA, LNP-S, PF, 100MCG/0.5ML DOSE(MODERNA): CPT | Performed by: FAMILY MEDICINE

## 2021-02-26 PROCEDURE — 91301 COVID-19, MRNA, LNP-S, PF, 100MCG/0.5ML DOSE(MODERNA): CPT | Performed by: FAMILY MEDICINE

## 2021-04-13 DIAGNOSIS — E78.5 HYPERLIPIDEMIA LDL GOAL <100: ICD-10-CM

## 2021-04-13 DIAGNOSIS — I10 ESSENTIAL HYPERTENSION: ICD-10-CM

## 2021-04-14 RX ORDER — PRAVASTATIN SODIUM 20 MG/1
TABLET ORAL
Qty: 90 TAB | Refills: 0 | Status: SHIPPED | OUTPATIENT
Start: 2021-04-14 | End: 2021-05-11 | Stop reason: SDUPTHER

## 2021-04-14 RX ORDER — ATENOLOL 50 MG/1
TABLET ORAL
Qty: 90 TAB | Refills: 0 | Status: SHIPPED | OUTPATIENT
Start: 2021-04-14 | End: 2021-05-11 | Stop reason: SDUPTHER

## 2021-05-11 ENCOUNTER — OFFICE VISIT (OUTPATIENT)
Dept: FAMILY MEDICINE CLINIC | Age: 71
End: 2021-05-11
Payer: MEDICARE

## 2021-05-11 VITALS
TEMPERATURE: 98.7 F | HEART RATE: 54 BPM | WEIGHT: 230 LBS | DIASTOLIC BLOOD PRESSURE: 78 MMHG | SYSTOLIC BLOOD PRESSURE: 136 MMHG | HEIGHT: 66 IN | RESPIRATION RATE: 16 BRPM | OXYGEN SATURATION: 97 % | BODY MASS INDEX: 36.96 KG/M2

## 2021-05-11 DIAGNOSIS — E78.5 HYPERLIPIDEMIA LDL GOAL <100: ICD-10-CM

## 2021-05-11 DIAGNOSIS — K21.9 GASTROESOPHAGEAL REFLUX DISEASE WITHOUT ESOPHAGITIS: ICD-10-CM

## 2021-05-11 DIAGNOSIS — Z71.89 ACP (ADVANCE CARE PLANNING): ICD-10-CM

## 2021-05-11 DIAGNOSIS — F43.9 SITUATIONAL STRESS: ICD-10-CM

## 2021-05-11 DIAGNOSIS — J30.1 SEASONAL ALLERGIC RHINITIS DUE TO POLLEN: ICD-10-CM

## 2021-05-11 DIAGNOSIS — J30.1 CHRONIC SEASONAL ALLERGIC RHINITIS DUE TO POLLEN: ICD-10-CM

## 2021-05-11 DIAGNOSIS — E66.9 OBESITY (BMI 30-39.9): ICD-10-CM

## 2021-05-11 DIAGNOSIS — Z00.00 MEDICARE ANNUAL WELLNESS VISIT, SUBSEQUENT: ICD-10-CM

## 2021-05-11 DIAGNOSIS — I10 ESSENTIAL HYPERTENSION: Primary | ICD-10-CM

## 2021-05-11 DIAGNOSIS — R73.09 ELEVATED GLUCOSE: ICD-10-CM

## 2021-05-11 DIAGNOSIS — E11.9 DIABETES MELLITUS WITHOUT COMPLICATION (HCC): ICD-10-CM

## 2021-05-11 DIAGNOSIS — Z00.00 ENCOUNTER FOR ANNUAL WELLNESS EXAM IN MEDICARE PATIENT: ICD-10-CM

## 2021-05-11 LAB
COMMENT, HOLDF: NORMAL
SAMPLES BEING HELD,HOLD: NORMAL

## 2021-05-11 PROCEDURE — G0439 PPPS, SUBSEQ VISIT: HCPCS | Performed by: FAMILY MEDICINE

## 2021-05-11 PROCEDURE — G8752 SYS BP LESS 140: HCPCS | Performed by: FAMILY MEDICINE

## 2021-05-11 PROCEDURE — 1090F PRES/ABSN URINE INCON ASSESS: CPT | Performed by: FAMILY MEDICINE

## 2021-05-11 PROCEDURE — 2022F DILAT RTA XM EVC RTNOPTHY: CPT | Performed by: FAMILY MEDICINE

## 2021-05-11 PROCEDURE — G8399 PT W/DXA RESULTS DOCUMENT: HCPCS | Performed by: FAMILY MEDICINE

## 2021-05-11 PROCEDURE — G8417 CALC BMI ABV UP PARAM F/U: HCPCS | Performed by: FAMILY MEDICINE

## 2021-05-11 PROCEDURE — G8510 SCR DEP NEG, NO PLAN REQD: HCPCS | Performed by: FAMILY MEDICINE

## 2021-05-11 PROCEDURE — G8427 DOCREV CUR MEDS BY ELIG CLIN: HCPCS | Performed by: FAMILY MEDICINE

## 2021-05-11 PROCEDURE — G8536 NO DOC ELDER MAL SCRN: HCPCS | Performed by: FAMILY MEDICINE

## 2021-05-11 PROCEDURE — 99214 OFFICE O/P EST MOD 30 MIN: CPT | Performed by: FAMILY MEDICINE

## 2021-05-11 PROCEDURE — 1101F PT FALLS ASSESS-DOCD LE1/YR: CPT | Performed by: FAMILY MEDICINE

## 2021-05-11 PROCEDURE — G0463 HOSPITAL OUTPT CLINIC VISIT: HCPCS | Performed by: FAMILY MEDICINE

## 2021-05-11 PROCEDURE — 3044F HG A1C LEVEL LT 7.0%: CPT | Performed by: FAMILY MEDICINE

## 2021-05-11 PROCEDURE — 3017F COLORECTAL CA SCREEN DOC REV: CPT | Performed by: FAMILY MEDICINE

## 2021-05-11 PROCEDURE — G8754 DIAS BP LESS 90: HCPCS | Performed by: FAMILY MEDICINE

## 2021-05-11 RX ORDER — PRAVASTATIN SODIUM 20 MG/1
20 TABLET ORAL DAILY
Qty: 90 TAB | Refills: 1 | Status: SHIPPED | OUTPATIENT
Start: 2021-05-11 | End: 2021-09-18

## 2021-05-11 RX ORDER — ATENOLOL 50 MG/1
50 TABLET ORAL DAILY
Qty: 90 TAB | Refills: 1 | Status: SHIPPED | OUTPATIENT
Start: 2021-05-11 | End: 2021-09-18

## 2021-05-11 NOTE — PROGRESS NOTES
Chief Complaint   Patient presents with   Jazmine Galindo Annual Wellness Visit   1. Have you been to the ER, urgent care clinic since your last visit? Hospitalized since your last visit? No    2. Have you seen or consulted any other health care providers outside of the [address] [place] [address] since your last visit? Include any pap smears or colon screening.  No     mammo at [facility]

## 2021-05-11 NOTE — PROGRESS NOTES
HISTORY OF PRESENT ILLNESS  HPI  Maday Lopez is a 69 y. o. female with a history of HTN, GERD, resolved DM( due to previous weight loss), calculus of kidney, dyspareunia, motion sickness, age-related cataracts and hyperlipidemia with LDL goal <100, who presents to the office today for an annual wellness visit and for follow up of these health problems. Pt is not fasting. She had a glucose of 174 in 2015. She was 230 lbs when she was diagnosed with DM in 2015 and dropped to 205 lbs. 205 lbs was her low point. Pt followed the [employer] diet when she loss weight. Pt denies unusual SOB, chest pain, and any recent ER visits or hospitalizations. Past Medical History:   Diagnosis Date    Adverse effect of anesthesia     treats pt for motion sickness with a patch    AR (allergic rhinitis)     Calculus of kidney     Dyspareunia     GERD (gastroesophageal reflux disease) 11/30/2010    HBP (high blood pressure) 8/9/2010    History of motion sickness 3/29/2014    Hyperlipidemia LDL goal <100 7/11/2018    Incomplete rectal prolapse- asymptomatic-2015- found on GYN exam 8/6/2015    Obesity (BMI 30-39.9) 6/30/2013     Past Surgical History:   Procedure Laterality Date    COLONOSCOPY N/A 10/4/2017    COLONOSCOPY performed by Eloy Cortez MD at [facility] ENDOSCOPY    HX CATARACT REMOVAL Right 08/2018    left 09/18    HX CHOLECYSTECTOMY  1987    HX COLONOSCOPY  2007    repeat in 2017    HX HEENT Right     macular hole repair on right eye    HX KNEE ARTHROSCOPY Left 02/15/2017    meniscus     Current Outpatient Medications on File Prior to Visit   Medication Sig Dispense Refill    omega 3-dha-epa-fish oil (FISH OIL) 100-160-1,000 mg cap Fish Oil      cholecalciferol (VITAMIN D3) 1,000 unit cap Vitamin D3      cetirizine (ZYRTEC) 10 mg tablet Take 10 mg by mouth daily.  famotidine (PEPCID) 20 mg tablet Take 20 mg by mouth daily.       multivitamin (ONE A DAY) tablet Take 1 Tab by mouth daily.      LACTOBACILLUS ACIDOPHILUS (PROBIOTIC PO) Take  by mouth daily.  [DISCONTINUED] Transderm-Scop 1 mg over 3 days pt3d APPLY 1 PATCH EVERY 72  HOURS. 30 Patch 1    [DISCONTINUED] naproxen sodium (ALEVE) 220 mg tablet Take 220-440 mg by mouth two (2) times daily (with meals). Prn      [DISCONTINUED] acetaminophen (TYLENOL EXTRA STRENGTH) 500 mg tablet Take 2 Tabs by mouth every six (6) hours as needed for Pain. No current facility-administered medications on file prior to visit. Allergies   Allergen Reactions    Doxycycline Rash    Sulfa (Sulfonamide Antibiotics) Rash    Tetracycline Rash     Family History   Problem Relation Age of Onset    Hypertension Mother    [employer] Elevated Lipids Mother     Arrhythmia Mother         a. fib    Alcohol abuse Father     Arrhythmia Paternal Grandmother 80        had a pacemaker    Heart Attack Paternal Grandfather 79    Cancer Other         melanoma in daughter     Social History     Socioeconomic History    Marital status:      Spouse name: Not on file    Number of children: Not on file    Years of education: Not on file    Highest education level: Not on file   Tobacco Use    Smoking status: Never Smoker    Smokeless tobacco: Never Used   Substance and Sexual Activity    Alcohol use: Yes     Comment: occ    Drug use: No    Sexual activity: Yes   Other Topics Concern     Service No    Blood Transfusions No    Caffeine Concern No    Occupational Exposure No    Hobby Hazards No    Sleep Concern No    Stress Concern No    Weight Concern Yes    Special Diet No    Back Care No    Exercise Yes     Comment: walking    Bike Helmet No     Comment: does not ride   [address] Yes    Self-Exams Yes               Review of Systems   Constitutional: Negative for chills, diaphoresis, fever, malaise/fatigue and weight loss.    HENT: Negative for congestion, ear discharge, ear pain, hearing loss, nosebleeds, sore throat and tinnitus. Eyes: Negative for blurred vision, double vision, photophobia, pain, discharge and redness. Respiratory: Negative for cough, hemoptysis, sputum production, shortness of breath, wheezing and stridor. Cardiovascular: Negative for chest pain, palpitations, orthopnea, claudication, leg swelling and PND. Gastrointestinal: Negative for abdominal pain, blood in stool, constipation, diarrhea, heartburn, melena, nausea and vomiting. Genitourinary: Negative for dysuria, flank pain, frequency, hematuria and urgency. Musculoskeletal: Negative for back pain, falls, joint pain, myalgias and neck pain. Skin: Negative for itching and rash. Neurological: Negative for dizziness, tingling, tremors, sensory change, speech change, focal weakness, seizures, loss of consciousness, weakness and headaches. Endo/Heme/Allergies: Negative for environmental allergies and polydipsia. Does not bruise/bleed easily. Psychiatric/Behavioral: Negative for depression, hallucinations, memory loss, substance abuse and suicidal ideas. The patient is not nervous/anxious and does not have insomnia.       Results for orders placed or performed in visit on 05/11/21   HEMOGLOBIN A1C WITH EAG   Result Value Ref Range    Hemoglobin A1c 6.1 (H) 4.0 - 5.6 %    Est. average glucose 128 mg/dL   LIPID PANEL   Result Value Ref Range    LIPID PROFILE          Cholesterol, total 214 (H) <200 MG/DL    Triglyceride 119 <150 MG/DL    HDL Cholesterol 88 MG/DL    LDL, calculated 102.2 (H) 0 - 100 MG/DL    VLDL, calculated 23.8 MG/DL    CHOL/HDL Ratio 2.4 0.0 - 5.0     URINALYSIS W/MICROSCOPIC   Result Value Ref Range    Color YELLOW/STRAW      Appearance CLEAR CLEAR      Specific gravity 1.008 1.003 - 1.030      pH (UA) 5.5 5.0 - 8.0      Protein Negative Negative mg/dL    Glucose Negative Negative mg/dL    Ketone Negative Negative mg/dL    Bilirubin Negative Negative      Blood Negative Negative      Urobilinogen 0.2 0.2 - 1.0 EU/dL Nitrites Negative Negative      Leukocyte Esterase Negative Negative      WBC 0-4 0 - 4 /hpf    RBC 0-5 0 - 5 /hpf    Epithelial cells FEW FEW /lpf    Bacteria Negative Negative /hpf    Hyaline cast 0-2 0 - 5 /lpf   CBC W/O DIFF   Result Value Ref Range    WBC 6.2 3.6 - 11.0 K/uL    RBC 4.15 3.80 - 5.20 M/uL    HGB 14.2 11.5 - 16.0 g/dL    HCT 42.5 35.0 - 47.0 %    .4 (H) 80.0 - 99.0 FL    MCH 34.2 (H) 26.0 - 34.0 PG    MCHC 33.4 30.0 - 36.5 g/dL    RDW 12.2 11.5 - 14.5 %    PLATELET 175 435 - 832 K/uL    MPV 11.9 8.9 - 12.9 FL    NRBC 0.0 0  WBC    ABSOLUTE NRBC 0.00 0.00 - 6.37 K/uL   METABOLIC PANEL, COMPREHENSIVE   Result Value Ref Range    Sodium 143 136 - 145 mmol/L    Potassium 4.8 3.5 - 5.1 mmol/L    Chloride 109 (H) 97 - 108 mmol/L    CO2 29 21 - 32 mmol/L    Anion gap 5 5 - 15 mmol/L    Glucose 91 65 - 100 mg/dL    BUN 12 6 - 20 MG/DL    Creatinine 0.86 0.55 - 1.02 MG/DL    BUN/Creatinine ratio 14 12 - 20      GFR est AA >60 >60 ml/min/1.73m2    GFR est non-AA >60 >60 ml/min/1.73m2    Calcium 9.2 8.5 - 10.1 MG/DL    Bilirubin, total 0.6 0.2 - 1.0 MG/DL    ALT (SGPT) 38 12 - 78 U/L    AST (SGOT) 23 15 - 37 U/L    Alk. phosphatase 129 (H) 45 - 117 U/L    Protein, total 6.9 6.4 - 8.2 g/dL    Albumin 4.0 3.5 - 5.0 g/dL    Globulin 2.9 2.0 - 4.0 g/dL    A-G Ratio 1.4 1.1 - 2.2     SAMPLES BEING HELD   Result Value Ref Range    SAMPLES BEING HELD 1 SST     COMMENT        Add-on orders for these samples will be processed based on acceptable specimen integrity and analyte stability, which may vary by analyte. Physical Exam  Visit Vitals  /78   Pulse (!) 54   Temp 98.7 °F (37.1 °C)   Resp 16   Ht 5' 5.5\" (1.664 m)   Wt 230 lb (104.3 kg)   SpO2 97%   BMI 37.69 kg/m²         General:  Alert, cooperative, no distress, appears stated age. Head:  Normocephalic, without obvious abnormality, atraumatic. Eyes:  Conjunctivae/corneas clear. PERRL, EOMs intact. Fundi benign.    Ears:  Normal TMs and external ear canals both ears. Nose: Nares normal. Septum midline. Mucosa normal. No drainage or sinus tenderness. Throat: Lips, mucosa, and tongue normal. Teeth and gums normal.   Neck: Supple, symmetrical, trachea midline, no adenopathy, thyroid: no enlargement/tenderness/nodules, no carotid bruit and no JVD. Back:   Symmetric, no curvature. ROM normal. No CVA tenderness. Lungs:   Clear to auscultation bilaterally. Chest wall:  No tenderness or deformity. Heart:  Regular rate and rhythm, S1, S2 normal, no murmur, click, rub or gallop. Abdomen:   Soft, non-tender. Bowel sounds normal. No masses,  No organomegaly. Extremities: Extremities normal, atraumatic, no cyanosis or edema. Pulses: 2+ and symmetric all extremities. Skin: Skin color, texture, turgor normal. No rashes or lesions. Lymph nodes: Cervical, supraclavicular, and axillary nodes normal.   Neurologic: CNII-XII intact. Normal strength, sensation and reflexes throughout. ASSESSMENT and PLAN    ICD-10-CM ICD-9-CM    1. Essential hypertension  I10 401.9 LIPID PANEL      METABOLIC PANEL, COMPREHENSIVE      CBC W/O DIFF      atenoloL (TENORMIN) 50 mg tablet      LIPID PANEL      CBC W/O DIFF      METABOLIC PANEL, COMPREHENSIVE   2. Encounter for annual wellness exam in Medicare patient  Z00.00 V70.0 LIPID PANEL      METABOLIC PANEL, COMPREHENSIVE      CBC W/O DIFF      URINALYSIS W/MICROSCOPIC      LIPID PANEL      URINALYSIS W/MICROSCOPIC      CBC W/O DIFF      METABOLIC PANEL, COMPREHENSIVE   3. Hyperlipidemia LDL goal <100  E78.5 272.4 LIPID PANEL      METABOLIC PANEL, COMPREHENSIVE      pravastatin (PRAVACHOL) 20 mg tablet      LIPID PANEL      METABOLIC PANEL, COMPREHENSIVE   4. Elevated glucose  R73.09 790.29 HEMOGLOBIN A1C WITH EAG      HEMOGLOBIN A1C WITH EAG   5. Situational stress-elderly Mom in [place]  F43.9 V62.89    6.  Seasonal allergic rhinitis due to pollen  J30.1 477.0    7. ACP (advance care planning) Z71.89 V65.49    8. Medicare annual wellness visit, subsequent  Z00.00 V70.0    9. Obesity (BMI 30-39. 9)  E66.9 278.00    10. Chronic seasonal allergic rhinitis due to pollen  J30.1 477.0    11. Gastroesophageal reflux disease without esophagitis  K21.9 530.81    12. Diabetes mellitus without complication ([facility] 75.)- 8335  E11.9 250.00      Diagnoses and all orders for this visit:    1. Essential hypertension  -     LIPID PANEL; Future  -     METABOLIC PANEL, COMPREHENSIVE; Future  -     CBC W/O DIFF; Future  -     atenoloL (TENORMIN) 50 mg tablet; Take 1 Tab by mouth daily. 2. Encounter for annual wellness exam in Medicare patient  -     LIPID PANEL; Future  -     METABOLIC PANEL, COMPREHENSIVE; Future  -     CBC W/O DIFF; Future  -     URINALYSIS W/MICROSCOPIC; Future    3. Hyperlipidemia LDL goal <100  -     LIPID PANEL; Future  -     METABOLIC PANEL, COMPREHENSIVE; Future  -     pravastatin (PRAVACHOL) 20 mg tablet; Take 1 Tab by mouth daily. 4. Elevated glucose  -     HEMOGLOBIN A1C WITH EAG; Future    5. Situational stress-elderly Mom in [place]    6. Seasonal allergic rhinitis due to pollen    7. ACP (advance care planning)    8. Medicare annual wellness visit, subsequent    9. Obesity (BMI 30-39.9)    10. Chronic seasonal allergic rhinitis due to pollen    11. Gastroesophageal reflux disease without esophagitis    12. Diabetes mellitus without complication ([facility] 75.)- 2411    Other orders  -     SAMPLES BEING HELD      Follow-up and Dispositions    · Return in about 6 months (around 11/11/2021) for F/U HTN and CHOL, F/U of obesity, elevated glucose, GERD. lab results and schedule of future lab studies reviewed with patient  reviewed diet, exercise and weight control  cardiovascular risk and specific lipid/LDL goals reviewed  reviewed medications and side effects in detail  Please call my office if there are any questions- [phone].   I will arrange for follow up after the lab tests done from today return  Recommended a weekly \"heart check. \" I went into detail how to do this. Call for refills on medications as needed. Discussed expected course/resolution/complications of diagnosis in detail with patient. Medication risks/benefits/costs/interactions/alternatives discussed with patient. Pt was given an after visit summary which includes diagnoses, current medications & vitals. Pt expressed understanding with the diagnosis and plan    We talked in detail about her weight some of the new mindful eating diets that are available. Unfortunately because she gained her weight back, the chance of her DM returning is much greater. BMI is significantly elevated- in the obese range. I reviewed diet, exercise and weight control. Discussed weight control in detail, the importance of mainly decreased carbs, and for weight maintenance, exercise; discussed different diets and that it isn't as important to watch the type of foods as it is to decrease calorie intake no matter what type of diet you do, etc.       Total 30 minutes  re: Discussed specific diabetic recommendations: low cholesterol diet, weight control and daily exercise discussed, home glucose monitoring emphasized, all medications, side effects and compliance discussed carefully, foot care discussed and Podiatry visits discussed, annual eye examinations at Ophthalmology discussed, glycohemoglobin and other lab monitoring discussed and long term diabetic complications discussed. Reviewed BP, cholesterol and diabetic goals. LDL goal<100,HDL goal>45, Triglyceride goal<150, A1C< 7.0, BP<140/90. Review of  the proper technique of checking the blood pressure- check it on an average day only, not on a stressful day, sitting, no exercise for at least 1 hour and not experiencing any new pain( chronic pain is OK).  Patient encouraged to check BP sitting and standing at least once a month and to report these readings to me if > 140/ 90 on average , or if the standing BP is >  15 points lower than the sitting. Always check it twice and if there is > 5 points decrease from the previous reading( top reading or systolic) keep checking it until it does not drop 5 points. Write only this final reading down, not the preceding readings. If out of these readings there is only 1 out of 4 or less > 090, or > 90 diastolic then your blood pressure is OK; it needs further treatment if it is above this. Also, don't forget,  as noted above, to check your blood pressure standing once a month; this is to detect a drop in your BP that might lead to fainting and serious injury; you check it standing with your arm hanging straight down and relaxed. Check it twice waiting 1 minute between the two readings. If, with either one of these 2 readings there is a > 15 point drop of the systolic compared to your sitting pressure( done before the standing BP), then let me know. Following these guidelines, continue to check your BP and write down only the ones described above and it will help me to effectively treat your blood pressure. Reviewed symptoms, or lack thereof, of hypertension and elevated cholesterol. Regular exercise is very important to your health; it helps mentally, physically, socially; it prevents injuries if done properly. Exercise, even as simple as walking 20-30 minutes daily has major benefits to your health even though your \"numbers\" are the same in the lab. See if you can add this into your daily regimen and after a few months it will become a regular habit-\"just something you do,\" like brushing your teeth. A combination of aerobic exercise and strengthening and stretching is felt to be the best for you, so this should be your ultimate goal.   This can be done in the privacy of your home or in a group setting as at the gym  Some prefer having a , others prefer to do exercise in groups or individually. Do what \"works\" for you.  You need to make it simple and \"fun,\" or you most likely will not continue it. Recommended a weekly \"heart check. \" I went into detail how to do this. Also, discussed symptoms of concern that were noted today in the note above, treatment options( including doing nothing), when to follow up before recommended time frame. Also, answered all questions. Long discussion re: Covid 19 infection, prevention, treatment limitations, importance of masks and distancing, and the available vaccines. She did eat around 1 pm.     Her advanced directive is in the chart and she wishes no changes. This document was written by Isabela Merritt, as dictated by Erin Armando MD.  I have reviewed and agree with the above note and have made corrections where appropriate Jagdeep Leonardo M.D. This is the Subsequent Medicare Annual Wellness Exam, performed 12 months or more after the Initial AWV or the last Subsequent AWV    I have reviewed the patient's medical history in detail and updated the computerized patient record. Assessment/Plan   Education and counseling provided:  Are appropriate based on today's review and evaluation    1. Essential hypertension  -     LIPID PANEL; Future  -     METABOLIC PANEL, COMPREHENSIVE; Future  -     CBC W/O DIFF; Future  -     atenoloL (TENORMIN) 50 mg tablet; Take 1 Tab by mouth daily. , Normal, Disp-90 Tab, R-1Requesting 1 year supply  2. Encounter for annual wellness exam in Medicare patient  -     LIPID PANEL; Future  -     METABOLIC PANEL, COMPREHENSIVE; Future  -     CBC W/O DIFF; Future  -     URINALYSIS W/MICROSCOPIC; Future  3. Hyperlipidemia LDL goal <100  -     LIPID PANEL; Future  -     METABOLIC PANEL, COMPREHENSIVE; Future  -     pravastatin (PRAVACHOL) 20 mg tablet; Take 1 Tab by mouth daily. , Normal, Disp-90 Tab, R-1Requesting 1 year supply  4. Elevated glucose  -     HEMOGLOBIN A1C WITH EAG; Future  5. Situational stress-elderly Mom in [place]  6.  Seasonal allergic rhinitis due to pollen  7. ACP (advance care planning)  8. Medicare annual wellness visit, subsequent  9. Obesity (BMI 30-39.9)  10. Chronic seasonal allergic rhinitis due to pollen  11. Gastroesophageal reflux disease without esophagitis  12. Diabetes mellitus without complication ([facility]- 8769       Depression Risk Factor Screening     3 most recent PHQ Screens 5/11/2021   Little interest or pleasure in doing things Not at all   Feeling down, depressed, irritable, or hopeless Not at all   Total Score PHQ 2 0       Alcohol Risk Screen    Do you average more than 1 drink per night or more than 7 drinks a week:  No    On any one occasion in the past three months have you have had more than 3 drinks containing alcohol:  No        Functional Ability and Level of Safety    Hearing: Hearing is good. Activities of Daily Living: The home contains: no safety equipment. Patient does total self care      Ambulation: with no difficulty     Fall Risk:  Fall Risk Assessment, last 12 mths 5/11/2021   Able to walk? Yes   Fall in past 12 months? 0   Do you feel unsteady? 0   Are you worried about falling 0   Number of falls in past 12 months -   Fall with injury? -      Abuse Screen:  Patient is not abused       Cognitive Screening    Has your family/caregiver stated any concerns about your memory: no     Cognitive Screening: Normal - Mini Cog Test   Advanced directive already completed and in the chart. No changes desired. Health Maintenance Due     Health Maintenance Due   Topic Date Due    Breast Cancer Screen Mammogram  11/07/2018       Patient Care Team   Patient Care Team:  Mark Hwang MD as PCP - Alcides Wells MD as PCP - Brooke Lombardi Provider    History     Patient Active Problem List   Diagnosis Code    Dyspareunia [id number]    Calculus of kidney N20.0    Obesity (BMI 30-39. 9) E66.9    Situational stress-elderly Mom in [place] F43.9    History of motion sickness Z87.898    Incomplete rectal prolapse- asymptomatic-2015- found on GYN exam K62.3    Diabetes mellitus without complication ([facility]- 3602 E11.9    Essential hypertension I10    Allergic rhinitis due to pollen J30.1    Gastroesophageal reflux disease without esophagitis K21.9    Benign papule- anterior to right pinna12/2015 R23.8    ACP (advance care planning) Z71.89    Hyperlipidemia LDL goal <100 E78.5    Combined forms of age-related cataract of right eye H25.811    Combined forms of age-related cataract of left eye H25.812    HBP (high blood pressure) I10    GERD (gastroesophageal reflux disease) K21.9    AR (allergic rhinitis) J30.9    Severe obesity (HCC) E66.01     Past Medical History:   Diagnosis Date    Adverse effect of anesthesia     treats pt for motion sickness with a patch    AR (allergic rhinitis)     Calculus of kidney     Dyspareunia     GERD (gastroesophageal reflux disease) 11/30/2010    HBP (high blood pressure) 8/9/2010    History of motion sickness 3/29/2014    Hyperlipidemia LDL goal <100 7/11/2018    Incomplete rectal prolapse- asymptomatic-2015- found on GYN exam 8/6/2015    Obesity (BMI 30-39.9) 6/30/2013      Past Surgical History:   Procedure Laterality Date    COLONOSCOPY N/A 10/4/2017    COL[facility]COPY performed by Kim Nam MD at [facility] ENDOSCOPY    HX CATARACT REMOVAL Right 08/2018    left 09/18    HX CHOLECYSTECTOMY  1987    HX COLONOSCOPY  2007    repeat in 2017    HX HEENT Right     macular hole repair on right eye    HX KNEE ARTHROSCOPY Left 02/15/2017    meniscus     Current Outpatient Medications   Medication Sig Dispense Refill    pravastatin (PRAVACHOL) 20 mg tablet Take 1 Tab by mouth daily. 90 Tab 1    atenoloL (TENORMIN) 50 mg tablet Take 1 Tab by mouth daily. 90 Tab 1    omega 3-dha-epa-fish oil (FISH OIL) 100-160-1,000 mg cap Fish Oil      cholecalciferol (VITAMIN D3) 1,000 unit cap Vitamin D3      cetirizine (ZYRTEC) 10 mg tablet Take 10 mg by mouth daily.       famotidine (PEPCID) 20 mg tablet Take 20 mg by mouth daily.  multivitamin (ONE A DAY) tablet Take 1 Tab by mouth daily.  LACTOBACILLUS ACIDOPHILUS (PROBIOTIC PO) Take  by mouth daily.        Allergies   Allergen Reactions    Doxycycline Rash    Sulfa (Sulfonamide Antibiotics) Rash    Tetracycline Rash       Family History   Problem Relation Age of Onset    Hypertension Mother     Elevated Lipids Mother     Arrhythmia Mother         a. fib    Alcohol abuse Father     Arrhythmia Paternal Grandmother 80        had a pacemaker    Heart Attack Paternal Grandfather 79    Cancer Other         melanoma in daughter     Social History     Tobacco Use    Smoking status: Never Smoker    Smokeless tobacco: Never Used   Substance Use Topics    Alcohol use: Yes     Comment: Joshua Carrillo MD

## 2021-05-12 LAB
ALBUMIN SERPL-MCNC: 4 G/DL (ref 3.5–5)
ALBUMIN/GLOB SERPL: 1.4 {RATIO} (ref 1.1–2.2)
ALP SERPL-CCNC: 129 U/L (ref 45–117)
ALT SERPL-CCNC: 38 U/L (ref 12–78)
ANION GAP SERPL CALC-SCNC: 5 MMOL/L (ref 5–15)
APPEARANCE UR: CLEAR
AST SERPL-CCNC: 23 U/L (ref 15–37)
BACTERIA URNS QL MICRO: NEGATIVE /HPF
BILIRUB SERPL-MCNC: 0.6 MG/DL (ref 0.2–1)
BILIRUB UR QL: NEGATIVE
BUN SERPL-MCNC: 12 MG/DL (ref 6–20)
BUN/CREAT SERPL: 14 (ref 12–20)
CALCIUM SERPL-MCNC: 9.2 MG/DL (ref 8.5–10.1)
CHLORIDE SERPL-SCNC: 109 MMOL/L (ref 97–108)
CHOLEST SERPL-MCNC: 214 MG/DL
CO2 SERPL-SCNC: 29 MMOL/L (ref 21–32)
COLOR UR: NORMAL
CREAT SERPL-MCNC: 0.86 MG/DL (ref 0.55–1.02)
EPITH CASTS URNS QL MICRO: NORMAL /LPF
ERYTHROCYTE [DISTWIDTH] IN BLOOD BY AUTOMATED COUNT: 12.2 % (ref 11.5–14.5)
EST. AVERAGE GLUCOSE BLD GHB EST-MCNC: 128 MG/DL
GLOBULIN SER CALC-MCNC: 2.9 G/DL (ref 2–4)
GLUCOSE SERPL-MCNC: 91 MG/DL (ref 65–100)
GLUCOSE UR STRIP.AUTO-MCNC: NEGATIVE MG/DL
HBA1C MFR BLD: 6.1 % (ref 4–5.6)
HCT VFR BLD AUTO: 42.5 % (ref 35–47)
HDLC SERPL-MCNC: 88 MG/DL
HDLC SERPL: 2.4 {RATIO} (ref 0–5)
HGB BLD-MCNC: 14.2 G/DL (ref 11.5–16)
HGB UR QL STRIP: NEGATIVE
HYALINE CASTS URNS QL MICRO: NORMAL /LPF (ref 0–5)
KETONES UR QL STRIP.AUTO: NEGATIVE MG/DL
LDLC SERPL CALC-MCNC: 102.2 MG/DL (ref 0–100)
LEUKOCYTE ESTERASE UR QL STRIP.AUTO: NEGATIVE
LIPID PROFILE,FLP: ABNORMAL
MCH RBC QN AUTO: 34.2 PG (ref 26–34)
MCHC RBC AUTO-ENTMCNC: 33.4 G/DL (ref 30–36.5)
MCV RBC AUTO: 102.4 FL (ref 80–99)
NITRITE UR QL STRIP.AUTO: NEGATIVE
NRBC # BLD: 0 K/UL (ref 0–0.01)
NRBC BLD-RTO: 0 PER 100 WBC
PH UR STRIP: 5.5 [PH] (ref 5–8)
PLATELET # BLD AUTO: 174 K/UL (ref 150–400)
PMV BLD AUTO: 11.9 FL (ref 8.9–12.9)
POTASSIUM SERPL-SCNC: 4.8 MMOL/L (ref 3.5–5.1)
PROT SERPL-MCNC: 6.9 G/DL (ref 6.4–8.2)
PROT UR STRIP-MCNC: NEGATIVE MG/DL
RBC # BLD AUTO: 4.15 M/UL (ref 3.8–5.2)
RBC #/AREA URNS HPF: NORMAL /HPF (ref 0–5)
SODIUM SERPL-SCNC: 143 MMOL/L (ref 136–145)
SP GR UR REFRACTOMETRY: 1.01 (ref 1–1.03)
TRIGL SERPL-MCNC: 119 MG/DL (ref ?–150)
UROBILINOGEN UR QL STRIP.AUTO: 0.2 EU/DL (ref 0.2–1)
VLDLC SERPL CALC-MCNC: 23.8 MG/DL
WBC # BLD AUTO: 6.2 K/UL (ref 3.6–11)
WBC URNS QL MICRO: NORMAL /HPF (ref 0–4)

## 2021-05-12 NOTE — PATIENT INSTRUCTIONS
Medicare Wellness Visit, Female The best way to live healthy is to have a lifestyle where you eat a well-balanced diet, exercise regularly, limit alcohol use, and quit all forms of tobacco/nicotine, if applicable. Regular preventive services are another way to keep healthy. Preventive services (vaccines, screening tests, monitoring & exams) can help personalize your care plan, which helps you manage your own care. Screening tests can find health problems at the earliest stages, when they are easiest to treat. [facility] follows the current, evidence-based guidelines published by the [facility] Araujo ([facility]) when recommending preventive services for our patients. Because we follow these guidelines, sometimes recommendations change over time as research supports it. (For example, mammograms used to be recommended annually. Even though Medicare will still pay for an annual mammogram, the newer guidelines recommend a mammogram every two years for women of average risk). Of course, you and your doctor may decide to screen more often for some diseases, based on your risk and your co-morbidities (chronic disease you are already diagnosed with). Preventive services for you include: - Medicare offers their members a free annual wellness visit, which is time for you and your primary care provider to discuss and plan for your preventive service needs. Take advantage of this benefit every year! 
-All adults over the age of 72 should receive the recommended pneumonia vaccines. Current USPSTF guidelines recommend a series of two vaccines for the best pneumonia protection.  
-All adults should have a flu vaccine yearly and a tetanus vaccine every 10 years.  
-All adults age 48 and older should receive the shingles vaccines (series of two vaccines).      
-All adults age 38-68 who are overweight should have a diabetes screening test once every three years.  
-All adults born between [birth year] and 1966 should be screened once for Hepatitis C. 
-Other screening tests and preventive services for persons with diabetes include: an eye exam to screen for diabetic retinopathy, a kidney function test, a foot exam, and stricter control over your cholesterol.  
-Cardiovascular screening for adults with routine risk involves an electrocardiogram (ECG) at intervals determined by your doctor.  
-Colorectal cancer screenings should be done for adults age 54-65 with no increased risk factors for colorectal cancer. There are a number of acceptable methods of screening for this type of cancer. Each test has its own benefits and drawbacks. Discuss with your doctor what is most appropriate for you during your annual wellness visit. The different tests include: colonoscopy (considered the best screening method), a fecal occult blood test, a fecal DNA test, and sigmoidoscopy. 
 
-A bone mass density test is recommended when a woman turns 65 to screen for osteoporosis. This test is only recommended one time, as a screening. Some providers will use this same test as a disease monitoring tool if you already have osteoporosis. -Breast cancer screenings are recommended every other year for women of normal risk, age 54-69. 
-Cervical cancer screenings for women over age 72 are only recommended with certain risk factors. Here is a list of your current Health Maintenance items (your personalized list of preventive services) with a due date: 
Health Maintenance Due Topic Date Due  Mammogram  11/07/2018

## 2021-05-13 NOTE — PROGRESS NOTES
A1C is still at a good level but has increased from 5.2 to 6.1, so need you to get back on the weight loss mode and recheck office visit non fasting in 3-4 months.

## 2021-06-21 NOTE — MR AVS SNAPSHOT
Visit Information Date & Time Provider Department Dept. Phone Encounter #  
 1/10/2018 11:00 AM Shemar Campos MD [address] [place] [address] [phone] [id number] Upcoming Health Maintenance Date Due  
 EYE EXAM RETINAL OR DILATED Q1 6/13/2017 FOOT EXAM Q1 2/12/2018 MEDICARE YEARLY EXAM 5/2/2018 GLAUCOMA SCREENING Q2Y 6/13/2018 HEMOGLOBIN A1C Q6M 6/13/2018 BREAST CANCER SCRN MAMMOGRAM 11/7/2018 MICROALBUMIN Q1 12/13/2018 LIPID PANEL Q1 12/13/2018 DTaP/Tdap/Td series (2 - Td) 10/25/2020 COLONOSCOPY 10/4/2027 Allergies as of 1/10/2018  Review Complete On: 1/10/2018 By: Tierra Sifuentes LPN Severity Noted Reaction Type Reactions Doxycycline  05/30/2012   Systemic Rash  
 Sulfa (Sulfonamide Antibiotics)  08/09/2010    Rash Tetracycline  08/09/2010    Rash Current Immunizations  Never Reviewed Name Date Influenza High Dose Vaccine PF 10/12/2017, 10/3/2016, 10/16/2015 Pneumococcal Conjugate (PCV-13) 10/21/2015 Pneumococcal Polysaccharide (PPSV-23) 1/23/2017 TDAP Vaccine 10/25/2010 Zoster Vaccine, Live 1/23/2013 Not reviewed this visit You Were Diagnosed With   
  
 Codes Comments Pre-op exam    -  Primary ICD-10-CM: N00.077 ICD-9-CM: V72.84 Vitals BP Pulse Temp Resp Height(growth percentile) Weight(growth percentile) 129/73 (BP 1 Location: Right arm, BP Patient Position: Sitting) 69 97.7 °F (36.5 °C) (Oral) 18 5' 6\" (1.676 m) 226 lb 12.8 oz (102.9 kg) SpO2 BMI OB Status Smoking Status 98% 36.61 kg/m2 Postmenopausal Never Smoker Vitals History BMI and BSA Data Body Mass Index Body Surface Area  
 36.61 kg/m 2 2.19 m 2 Preferred Pharmacy Pharmacy Name Phone [facility] [phone] Your Updated Medication List  
  
   
This list is accurate as of: 1/10/18 12:53 PM.  Always use your most recent med list.  
  
  
  
  
 ALEVE 220 mg tablet Generic drug:  naproxen sodium Take 220-440 mg by mouth two (2) times daily (with meals). Prn  
  
 aspirin delayed-release 81 mg tablet Take 81 mg by mouth daily. atenolol 50 mg tablet Commonly known as:  TENORMIN Take 1 tablet by mouth  daily  
  
 multivitamin tablet Commonly known as:  ONE A DAY Take 1 Tab by mouth daily. ofloxacin 0.3 % ophthalmic solution Commonly known as:  FLOXIN  
INSTILL 1 DROP INTO RIGHT EYE 4 TIMES A DAY AFTER SURGERY  
  
 PEPCID 20 mg tablet Generic drug:  famotidine Take 20 mg by mouth two (2) times a day. pravastatin 20 mg tablet Commonly known as:  PRAVACHOL Take 1 tablet by mouth  nightly  
  
 prednisoLONE acetate 1 % ophthalmic suspension Commonly known as:  PRED FORTE INSTILL 1 DROP INTO RIGHT EYE 4 TIMES A DAY AFTER SURGERY  
  
 PROBIOTIC PO Take  by mouth daily. TRANSDERM-SCOP 1 mg Pt3d Generic drug:  scopolamine Apply 1 patch transdermally every 72 hours as directed TYLENOL EXTRA STRENGTH 500 mg tablet Generic drug:  acetaminophen Take 2 Tabs by mouth every six (6) hours as needed for Pain. ZyrTEC 10 mg tablet Generic drug:  cetirizine Take 10 mg by mouth daily. We Performed the Following AMB POC EKG ROUTINE W/ 12 LEADS, INTER & REP [46720 CPT(R)] CBC WITH AUTOMATED DIFF [92664 CPT(R)] METABOLIC PANEL, COMPREHENSIVE [60796 CPT(R)] Introducing [facility]! Dear Bee Delcid: Thank you for requesting a goCatch account. Our records indicate that you already have an active goCatch account. You can access your account anytime at [email or web address] Did you know that you can access your hospital and ER discharge instructions at any time in goCatch? You can also review all of your test results from your hospital stay or ER visit. Additional Information If you have questions, please visit the Frequently Asked Questions section of the [employer] website at [email or web address] Remember, Biodirection is NOT to be used for urgent needs. For medical emergencies, dial 911. Now available from your iPhone and Android! Please provide this summary of care documentation to your next provider. Your primary care clinician is listed as [address] [address], [facility]/s . If you have any questions after today's visit, please call [phone]. detailed exam

## 2021-09-13 ENCOUNTER — HOSPITAL ENCOUNTER (OUTPATIENT)
Dept: PREADMISSION TESTING | Age: 71
Discharge: HOME | End: 2021-09-13
Payer: MEDICARE

## 2021-09-13 PROCEDURE — U0005 INFEC AGEN DETEC AMPLI PROBE: HCPCS

## 2021-09-14 LAB
SARS-COV-2, XPLCVT: NOT DETECTED
SOURCE, COVRS: NORMAL

## 2021-09-16 RX ORDER — HYDROGEN PEROXIDE 3 %
20 SOLUTION, NON-ORAL MISCELLANEOUS DAILY
COMMUNITY

## 2021-09-17 ENCOUNTER — ANESTHESIA (OUTPATIENT)
Dept: ENDOSCOPY | Age: 71
End: 2021-09-17
Payer: MEDICARE

## 2021-09-17 ENCOUNTER — ANESTHESIA EVENT (OUTPATIENT)
Dept: ENDOSCOPY | Age: 71
End: 2021-09-17
Payer: MEDICARE

## 2021-09-17 ENCOUNTER — HOSPITAL ENCOUNTER (OUTPATIENT)
Age: 71
Setting detail: OUTPATIENT SURGERY
Discharge: HOME | End: 2021-09-17
Attending: SPECIALIST | Admitting: SPECIALIST
Payer: MEDICARE

## 2021-09-17 VITALS
WEIGHT: 229 LBS | RESPIRATION RATE: 18 BRPM | BODY MASS INDEX: 36.8 KG/M2 | DIASTOLIC BLOOD PRESSURE: 63 MMHG | HEART RATE: 57 BPM | TEMPERATURE: 97.1 F | OXYGEN SATURATION: 96 % | SYSTOLIC BLOOD PRESSURE: 136 MMHG | HEIGHT: 66 IN

## 2021-09-17 DIAGNOSIS — I10 ESSENTIAL HYPERTENSION: ICD-10-CM

## 2021-09-17 DIAGNOSIS — E78.5 HYPERLIPIDEMIA LDL GOAL <100: ICD-10-CM

## 2021-09-17 PROCEDURE — 77030019988 HC FCPS ENDOSC DISP BSC -B: Performed by: SPECIALIST

## 2021-09-17 PROCEDURE — 76040000019: Performed by: SPECIALIST

## 2021-09-17 PROCEDURE — 76060000031 HC ANESTHESIA FIRST 0.5 HR: Performed by: SPECIALIST

## 2021-09-17 PROCEDURE — 74011250636 HC RX REV CODE- 250/636: Performed by: SPECIALIST

## 2021-09-17 PROCEDURE — 88305 TISSUE EXAM BY PATHOLOGIST: CPT

## 2021-09-17 PROCEDURE — 2709999900 HC NON-CHARGEABLE SUPPLY: Performed by: SPECIALIST

## 2021-09-17 PROCEDURE — 77030039825 HC MSK NSL PAP SUPERNO2VA VYRM -B: Performed by: ANESTHESIOLOGY

## 2021-09-17 PROCEDURE — 74011250636 HC RX REV CODE- 250/636: Performed by: ANESTHESIOLOGY

## 2021-09-17 PROCEDURE — 74011000250 HC RX REV CODE- 250: Performed by: ANESTHESIOLOGY

## 2021-09-17 RX ORDER — DEXTROMETHORPHAN/PSEUDOEPHED 2.5-7.5/.8
1.2 DROPS ORAL
Status: DISCONTINUED | OUTPATIENT
Start: 2021-09-17 | End: 2021-09-17 | Stop reason: HOSPADM

## 2021-09-17 RX ORDER — PROPOFOL 10 MG/ML
INJECTION, EMULSION INTRAVENOUS AS NEEDED
Status: DISCONTINUED | OUTPATIENT
Start: 2021-09-17 | End: 2021-09-17 | Stop reason: HOSPADM

## 2021-09-17 RX ORDER — SODIUM CHLORIDE 0.9 % (FLUSH) 0.9 %
5-40 SYRINGE (ML) INJECTION AS NEEDED
Status: DISCONTINUED | OUTPATIENT
Start: 2021-09-17 | End: 2021-09-17 | Stop reason: HOSPADM

## 2021-09-17 RX ORDER — SODIUM CHLORIDE 0.9 % (FLUSH) 0.9 %
5-40 SYRINGE (ML) INJECTION EVERY 8 HOURS
Status: DISCONTINUED | OUTPATIENT
Start: 2021-09-17 | End: 2021-09-17 | Stop reason: HOSPADM

## 2021-09-17 RX ORDER — GLYCOPYRROLATE 0.2 MG/ML
INJECTION INTRAMUSCULAR; INTRAVENOUS AS NEEDED
Status: DISCONTINUED | OUTPATIENT
Start: 2021-09-17 | End: 2021-09-17 | Stop reason: HOSPADM

## 2021-09-17 RX ORDER — LIDOCAINE HYDROCHLORIDE 20 MG/ML
INJECTION, SOLUTION EPIDURAL; INFILTRATION; INTRACAUDAL; PERINEURAL AS NEEDED
Status: DISCONTINUED | OUTPATIENT
Start: 2021-09-17 | End: 2021-09-17 | Stop reason: HOSPADM

## 2021-09-17 RX ORDER — SODIUM CHLORIDE 9 MG/ML
50 INJECTION, SOLUTION INTRAVENOUS CONTINUOUS
Status: DISCONTINUED | OUTPATIENT
Start: 2021-09-17 | End: 2021-09-17 | Stop reason: HOSPADM

## 2021-09-17 RX ADMIN — GLYCOPYRROLATE 0.2 MG: 0.2 INJECTION, SOLUTION INTRAMUSCULAR; INTRAVENOUS at 10:52

## 2021-09-17 RX ADMIN — SODIUM CHLORIDE 50 ML/HR: 9 INJECTION, SOLUTION INTRAVENOUS at 10:26

## 2021-09-17 RX ADMIN — PROPOFOL 160 MG: 10 INJECTION, EMULSION INTRAVENOUS at 11:07

## 2021-09-17 RX ADMIN — LIDOCAINE HYDROCHLORIDE 100 MG: 20 INJECTION, SOLUTION EPIDURAL; INFILTRATION; INTRACAUDAL; PERINEURAL at 10:52

## 2021-09-17 NOTE — PROCEDURES
Esophagogastroduodenoscopy Procedure Note      Huy Elliott  1950  [id number]    Indication:  Dysphagia     Endoscopist: Rc Deshpande MD    Referring Provider:  Nestor Delarosa MD    Sedation:  MAC anesthesia Propofol    Procedure Details:  After infomed consent was obtained for the procedure, with all risks and benefits of procedure explained the patient was taken to the endoscopy suite and placed in the left lateral decubitus position. Following sequential administration of sedation as per above, the endoscope was inserted into the mouth and advanced under direct vision to second portion of the duodenum. A careful inspection was made as the gastroscope was withdrawn, including a retroflexed view of the proximal stomach; findings and interventions are described below. Findings:     Esophagus:   + There was normal mucosa seen throughout the esophagus. S/P Bx of mid esophagus and GEJ sent in 2 separate jars. + 3 cm hiatal hernia.  + S/P dilation with 54 FR Savary over wire. Stomach:   + Diffuse streaking erythema in the gastric antrum s/p Bxs. Duodenum:   - Normal mucosa to the second portion. Therapies:  As above    Specimen: Specimens were collected as described and send to the laboratory. Complications:   None were encountered during the procedure. EBL: < 10 ml.           Recommendations:   -f/u path  -continue acid suppression    Rc Deshpande MD  9/17/2021  11:05 AM

## 2021-09-17 NOTE — ANESTHESIA POSTPROCEDURE EVALUATION
Procedure(s):  ESOPHAGOGASTRODUODENOSCOPY (EGD) WITH DILATION  ESOPHAGEAL DILATION  ESOPHAGOGASTRODUODENAL (EGD) BIOPSY. total IV anesthesia    Anesthesia Post Evaluation        Patient location during evaluation: PACU  Note status: Adequate. Level of consciousness: responsive to verbal stimuli and sleepy but conscious  Pain management: satisfactory to patient  Airway patency: patent  Anesthetic complications: no  Cardiovascular status: acceptable  Respiratory status: acceptable  Hydration status: acceptable  Comments: +Post-Anesthesia Evaluation and Assessment    Patient: Khadar Jain MRN: [id number]  SSN: [id number]   YOB: 1950  Age: 69 y.o. Sex: female      Cardiovascular Function/Vital Signs    /63   Pulse (!) 57   Temp 36.2 °C (97.1 °F)   Resp 18   Ht 5' 6\" (1.676 m)   Wt 103.9 kg (229 lb)   SpO2 96%   Breastfeeding No   BMI 36.96 kg/m²     Patient is status post Procedure(s):  ESOPHAGOGASTRODUODENOSCOPY (EGD) WITH DILATION  ESOPHAGEAL DILATION  ESOPHAGOGASTRODUODENAL (EGD) BIOPSY. Nausea/Vomiting: Controlled. Postoperative hydration reviewed and adequate. Pain:  Pain Scale 1: Numeric (0 - 10) (09/17/21 1124)  Pain Intensity 1: 0 (09/17/21 1124)   Managed. Neurological Status: At baseline. Mental Status and Level of Consciousness: Arousable. Pulmonary Status:   O2 Device: None (Room air) (09/17/21 1124)   Adequate oxygenation and airway patent. Complications related to anesthesia: None    Post-anesthesia assessment completed. No concerns. Signed By: Kenyetta Alberts DO    9/17/2021  Post anesthesia nausea and vomiting:  controlled      INITIAL Post-op Vital signs:   Vitals Value Taken Time   /63 09/17/21 1124   Temp 36.2 °C (97.1 °F) 09/17/21 1121   Pulse 52 09/17/21 1130   Resp 19 09/17/21 1130   SpO2 97 % 09/17/21 1130   Vitals shown include unvalidated device data.

## 2021-09-17 NOTE — PROGRESS NOTES
Endoscope was pre-cleaned at bedside immediately following procedure by Felisha Miranda. See Anesthesia report. Received report from anesthesia staff on vital signs and status of patient.

## 2021-09-17 NOTE — DISCHARGE INSTRUCTIONS
Sheila Yan  [id number]  1950    EGD DISCHARGE INSTRUCTIONS  Discomfort:  Sore throat- throat lozenges or warm salt water gargle  redness at IV site- apply warm compress to area; if redness or soreness persist- contact your physician  Gaseous discomfort- walking, belching will help relieve any discomfort  You may not operate a vehicle for 12 hours  You may not engage in an occupation involving machinery or appliances for rest of today. You may not drink alcoholic beverages for at least 12 hours  Avoid making any critical decisions for at least 24 hour  DIET  You may resume your regular diet - however -  remember your colon is empty and a heavy meal will produce gas. Avoid these foods:  vegetables, fried / greasy foods, carbonated drinks  MEDICATIONS        Regarding Aspirin or Nonsteroidal medications specifically, please see below. ACTIVITY  You may resume your normal daily activities. Spend the remainder of the day resting -  avoid any strenuous activity. CALL M.D. ANY SIGN OF   Increasing pain, nausea, vomiting  Abdominal distension (swelling)  New increased bleeding (oral or rectal)  Fever (chills)  Pain in chest area  Bloody discharge from nose or mouth  Shortness of breath      Tylenol as needed for pain.     Follow-up Instructions:   Call Dr. Shelli Javier for results of procedure / biopsy in 4-5 days at telephone #  [phone]              Continue medication for acid reflux

## 2021-09-17 NOTE — ROUTINE PROCESS
Sunny Thomas  1950  [id number]    Situation:  Verbal report received from: [address] [place] [address]  Procedure: Procedure(s):  ESOPHAGOGASTRODUODENOSCOPY (EGD) WITH DILATION  ESOPHAGEAL DILATION  ESOPHAGOGASTRODUODENAL (EGD) BIOPSY    Background:    Preoperative diagnosis: DYSPHAGIA, GERD  Postoperative diagnosis: hiatal hernia, dysphagia, gastritis    :  Dr. Ryan Peterson  Assistant(s): Endoscopy Technician-1: Lorenzo Ayala  Endoscopy RN-1: Bo Breen RN    Specimens:   ID Type Source Tests Collected by Time Destination   1 : stomach bx Preservative Stomach  Lawrence Luna MD 9/17/2021 1032 Pathology   2 : G E Junction bx Preservative GE Junction  Lawrence Luna MD 9/17/2021 1059 Pathology   3 : mid esophagus bx Preservative Esophagus, Mid  Lawrence Luna MD 9/17/2021 1100 Pathology     H. Pylori  no    Assessment:  Intra-procedure medications   tAnesthesia gave intra-procedure sedation and medications, see anesthesia flow sheet yes    Intravenous fluids: NS@ KVO     Vital signs stable yes    Abdominal assessment: round and soft yes    Recommendation:

## 2021-09-18 RX ORDER — PRAVASTATIN SODIUM 20 MG/1
TABLET ORAL
Qty: 90 TABLET | Refills: 1 | Status: SHIPPED | OUTPATIENT
Start: 2021-09-18 | End: 2022-01-19

## 2021-09-18 RX ORDER — ATENOLOL 50 MG/1
TABLET ORAL
Qty: 90 TABLET | Refills: 1 | Status: SHIPPED | OUTPATIENT
Start: 2021-09-18 | End: 2022-02-21

## 2021-11-08 ENCOUNTER — TELEPHONE (OUTPATIENT)
Dept: FAMILY MEDICINE CLINIC | Age: 71
End: 2021-11-08

## 2021-11-08 NOTE — TELEPHONE ENCOUNTER
Via fax from [facility], requested most recent medical record for upcoming appt w/Dr Sally Bar. ..faxed to [phone]

## 2021-12-08 ENCOUNTER — OFFICE VISIT (OUTPATIENT)
Dept: FAMILY MEDICINE CLINIC | Age: 71
End: 2021-12-08
Payer: MEDICARE

## 2021-12-08 VITALS
HEART RATE: 64 BPM | HEIGHT: 66 IN | DIASTOLIC BLOOD PRESSURE: 78 MMHG | OXYGEN SATURATION: 98 % | TEMPERATURE: 98.4 F | BODY MASS INDEX: 36.32 KG/M2 | SYSTOLIC BLOOD PRESSURE: 128 MMHG | RESPIRATION RATE: 16 BRPM | WEIGHT: 226 LBS

## 2021-12-08 DIAGNOSIS — J30.1 SEASONAL ALLERGIC RHINITIS DUE TO POLLEN: ICD-10-CM

## 2021-12-08 DIAGNOSIS — E78.5 HYPERLIPIDEMIA LDL GOAL <100: ICD-10-CM

## 2021-12-08 DIAGNOSIS — E66.01 SEVERE OBESITY (BMI 35.0-35.9 WITH COMORBIDITY) (HCC): ICD-10-CM

## 2021-12-08 DIAGNOSIS — I10 PRIMARY HYPERTENSION: ICD-10-CM

## 2021-12-08 DIAGNOSIS — E11.9 DIABETES MELLITUS WITHOUT COMPLICATION (HCC): Primary | ICD-10-CM

## 2021-12-08 DIAGNOSIS — F43.9 SITUATIONAL STRESS: ICD-10-CM

## 2021-12-08 PROCEDURE — 3017F COLORECTAL CA SCREEN DOC REV: CPT | Performed by: FAMILY MEDICINE

## 2021-12-08 PROCEDURE — G0463 HOSPITAL OUTPT CLINIC VISIT: HCPCS | Performed by: FAMILY MEDICINE

## 2021-12-08 PROCEDURE — 3044F HG A1C LEVEL LT 7.0%: CPT | Performed by: FAMILY MEDICINE

## 2021-12-08 PROCEDURE — G9899 SCRN MAM PERF RSLTS DOC: HCPCS | Performed by: FAMILY MEDICINE

## 2021-12-08 PROCEDURE — G8752 SYS BP LESS 140: HCPCS | Performed by: FAMILY MEDICINE

## 2021-12-08 PROCEDURE — 99215 OFFICE O/P EST HI 40 MIN: CPT | Performed by: FAMILY MEDICINE

## 2021-12-08 PROCEDURE — 2022F DILAT RTA XM EVC RTNOPTHY: CPT | Performed by: FAMILY MEDICINE

## 2021-12-08 PROCEDURE — 1090F PRES/ABSN URINE INCON ASSESS: CPT | Performed by: FAMILY MEDICINE

## 2021-12-08 PROCEDURE — G8399 PT W/DXA RESULTS DOCUMENT: HCPCS | Performed by: FAMILY MEDICINE

## 2021-12-08 PROCEDURE — G8417 CALC BMI ABV UP PARAM F/U: HCPCS | Performed by: FAMILY MEDICINE

## 2021-12-08 PROCEDURE — G8427 DOCREV CUR MEDS BY ELIG CLIN: HCPCS | Performed by: FAMILY MEDICINE

## 2021-12-08 PROCEDURE — G8754 DIAS BP LESS 90: HCPCS | Performed by: FAMILY MEDICINE

## 2021-12-08 PROCEDURE — 1101F PT FALLS ASSESS-DOCD LE1/YR: CPT | Performed by: FAMILY MEDICINE

## 2021-12-08 PROCEDURE — G8536 NO DOC ELDER MAL SCRN: HCPCS | Performed by: FAMILY MEDICINE

## 2021-12-08 PROCEDURE — G8510 SCR DEP NEG, NO PLAN REQD: HCPCS | Performed by: FAMILY MEDICINE

## 2021-12-08 NOTE — PROGRESS NOTES
Chief Complaint   Patient presents with    Hypertension    Diabetes    Cholesterol Problem   1. \"Have you been to the ER, urgent care clinic since your last visit? Hospitalized since your last visit? \" No    2. \"Have you seen or consulted any other health care providers outside of the [address] [place] [address] since your last visit? \" Yes Dr China Ryan to stretch esophagus     3. For patients over 45: Has the patient had a colonoscopy? Yes, HM satisfied with blue hyperlink     If the patient is female:    4. For patients over 40: Has the patient had a mammogram? Yes, HM satisfied with blue hyperlink    5. For patients over 21: Has the patient had a pap smear?  Yes, HM satisfied with blue hyperlink

## 2021-12-08 NOTE — PROGRESS NOTES
HISTORY OF PRESENT ILLNESS  HPI  Consuelo Lopez is a 83 U. o. female with a history of HTN, GERD, resolved DM( due to previous weight loss), calculus of kidney, dyspareunia, motion sickness, age-related cataracts and hyperlipidemia with LDL goal <100, who presents to the office today for follow up of these health problems. Pt notes she has had some gradual issues with swallowing with some associative heartburn. She noticed this when some chicken she ate took some time to come down. 2 days later, she ate some roast beef that was stuck for a longer period of time before coming down. Pt saw Dr. Cecil Saavedra (gastroenterology) for this and did an endoscopy and esophageal dilation in mid September. Workup found pt had a hiatal hernia. Pt was switched from taking Pepcid daily to taking OTC Nexium 20 mg daily at bed time. Pt gets most of her heartburn in the evening before supper. She does not check her BP at home. Pt denies unusual SOB, chest pain, and any recent ER visits or hospitalizations.          Past Medical History:   Diagnosis Date    Adverse effect of anesthesia     treats pt for motion sickness with a patch    AR (allergic rhinitis)     Calculus of kidney     Dyspareunia     GERD (gastroesophageal reflux disease) 11/30/2010    HBP (high blood pressure) 8/9/2010    History of motion sickness 3/29/2014    Hyperlipidemia LDL goal <100 7/11/2018    Incomplete rectal prolapse- asymptomatic-2015- found on GYN exam 8/6/2015    Obesity (BMI 30-39.9) 6/30/2013     Past Surgical History:   Procedure Laterality Date    COLONOSCOPY N/A 10/4/2017    COLONOSCOPY performed by Gail Oshea [facility] at [facility] ENDOSCOPY    HX CATARACT REMOVAL Right 08/2018    left 09/18    HX CHOLECYSTECTOMY  1987    HX COLONOSCOPY  2007    repeat in 2017    HX HEENT Right     macular hole repair on right eye    HX KNEE ARTHROSCOPY Left 02/15/2017    meniscus     Current Outpatient Medications on File Prior to Visit   Medication Sig Dispense Refill    atenoloL (TENORMIN) 50 mg tablet TAKE 1 TABLET BY MOUTH  DAILY 90 Tablet 1    pravastatin (PRAVACHOL) 20 mg tablet TAKE 1 TABLET BY MOUTH  DAILY 90 Tablet 1    esomeprazole (NexIUM) 20 mg capsule Take 20 mg by mouth daily.  omega 3-dha-epa-fish oil (FISH OIL) 100-160-1,000 mg cap Fish Oil      cholecalciferol (VITAMIN D3) 1,000 unit cap Vitamin D3      cetirizine (ZYRTEC) 10 mg tablet Take 10 mg by mouth daily.  LACTOBACILLUS ACIDOPHILUS (PROBIOTIC PO) Take  by mouth daily. No current facility-administered medications on file prior to visit. Allergies   Allergen Reactions    Doxycycline Rash    Sulfa (Sulfonamide Antibiotics) Rash    Tetracycline Rash     Family History   Problem Relation Age of Onset    Hypertension Mother 80    Elevated Lipids Mother     Arrhythmia Mother         a. [employer]    Alcohol abuse Father 68    Arrhythmia Paternal Grandmother 80        had a pacemaker    Heart Attack Paternal Grandfather 79    Cancer Other         melanoma in daughter     Social History     Socioeconomic History    Marital status:    Tobacco Use    Smoking status: Never Smoker    Smokeless tobacco: Never Used   Vaping Use    Vaping Use: Never used   Substance and Sexual Activity    Alcohol use: Yes     Comment: occ    Drug use: No    Sexual activity: Yes   Other Topics Concern     Service No    Blood Transfusions No    Caffeine Concern No    Occupational Exposure No    Hobby Hazards No    Sleep Concern No    Stress Concern No    Weight Concern Yes    Special Diet No    Back Care No    Exercise Yes     Comment: walking    Bike Helmet No     Comment: does not ride   [address] Yes    Self-Exams Yes                 Review of Systems   Constitutional: Negative for chills, diaphoresis, fever, malaise/fatigue and weight loss. Eyes: Negative for blurred vision, double vision, pain and redness.    Respiratory: Negative for cough, shortness of breath and wheezing. Cardiovascular: Negative for chest pain, palpitations, orthopnea, claudication, leg swelling and PND. Genitourinary: Negative for frequency. Skin: Negative for itching and rash. Neurological: Negative for dizziness, tingling, tremors, sensory change, speech change, focal weakness, seizures, loss of consciousness, weakness and headaches. Endo/Heme/Allergies: Negative for environmental allergies and polydipsia. Does not bruise/bleed easily. Results for orders placed or performed in visit on 12/08/21   HEMOGLOBIN A1C WITH EAG   Result Value Ref Range    Hemoglobin A1c 5.8 (H) 4.0 - 5.6 %    Est. average glucose 692 mg/dL   METABOLIC PANEL, COMPREHENSIVE   Result Value Ref Range    Sodium 138 136 - 145 mmol/L    Potassium 4.8 3.5 - 5.1 mmol/L    Chloride 107 97 - 108 mmol/L    CO2 29 21 - 32 mmol/L    Anion gap 2 (L) 5 - 15 mmol/L    Glucose 130 (H) 65 - 100 mg/dL    BUN 12 6 - 20 MG/DL    Creatinine 0.95 0.55 - 1.02 MG/DL    BUN/Creatinine ratio 13 12 - 20      GFR est AA >60 >60 ml/min/1.73m2    GFR est non-AA 58 (L) >60 ml/min/1.73m2    Calcium 9.3 8.5 - 10.1 MG/DL    Bilirubin, total 0.6 0.2 - 1.0 MG/DL    ALT (SGPT) 43 12 - 78 U/L    AST (SGOT) 31 15 - 37 U/L    Alk.  phosphatase 108 45 - 117 U/L    Protein, total 6.6 6.4 - 8.2 g/dL    Albumin 3.6 3.5 - 5.0 g/dL    Globulin 3.0 2.0 - 4.0 g/dL    A-G Ratio 1.2 1.1 - 2.2     LIPID PANEL   Result Value Ref Range    Cholesterol, total 195 <200 MG/DL    Triglyceride 113 <150 MG/DL    HDL Cholesterol 81 MG/DL    LDL, calculated 91.4 0 - 100 MG/DL    VLDL, calculated 22.6 MG/DL    CHOL/HDL Ratio 2.4 0.0 - 5.0                 Physical Exam  Visit Vitals  /78 (BP 1 Location: Left arm, BP Patient Position: Sitting, BP Cuff Size: Large adult)   Pulse 64   Temp 98.4 °F (36.9 °C)   Resp 16   Ht 5' 6\" (1.676 m)   Wt 226 lb (102.5 kg)   SpO2 98%   BMI 36.48 kg/m²         Head: Normocephalic, without obvious abnormality, atraumatic  Eyes: conjunctivae/corneas clear. PERRL, EOM's intact. Neck: supple, symmetrical, trachea midline, no adenopathy, thyroid: not enlarged, symmetric, no tenderness/mass/nodules, no carotid bruit and no JVD  Lungs: clear to auscultation bilaterally  Heart: regular rate and rhythm, S1, S2 normal, no murmur, click, rub or gallop  Extremities: extremities normal, atraumatic, no cyanosis or edema  Pulses: 2+ and symmetric  Lymph nodes: Cervical, supraclavicular, and axillary nodes normal.  Neurologic: Grossly normal        ASSESSMENT and PLAN    ICD-10-CM ICD-9-CM    1. Diabetes mellitus without complication ([facility].)- 9909  F28.1 679.66 METABOLIC PANEL, COMPREHENSIVE      HEMOGLOBIN A1C WITH EAG      HEMOGLOBIN A1C WITH EAG      METABOLIC PANEL, COMPREHENSIVE   2. Hyperlipidemia LDL goal <100  E78.5 272.4 LIPID PANEL      METABOLIC PANEL, COMPREHENSIVE      METABOLIC PANEL, COMPREHENSIVE      LIPID PANEL   3. Primary hypertension  F31 337.0 METABOLIC PANEL, COMPREHENSIVE      METABOLIC PANEL, COMPREHENSIVE   4. Severe obesity (BMI 35.0-35.9 with comorbidity) ([facility])  E66.01 278.01     Z68.35 V85.35    5. Situational stress-elderly Mom in [place]  F43.9 V62.89    6. Seasonal allergic rhinitis due to pollen  J30.1 477.0      Diagnoses and all orders for this visit:    1. Diabetes mellitus without complication ([facility]- 0791  -     METABOLIC PANEL, COMPREHENSIVE; Future  -     HEMOGLOBIN A1C WITH EAG; Future    2. Hyperlipidemia LDL goal <100  -     LIPID PANEL; Future  -     METABOLIC PANEL, COMPREHENSIVE; Future    3. Primary hypertension  -     METABOLIC PANEL, COMPREHENSIVE; Future    4. Severe obesity (BMI 35.0-35.9 with comorbidity) ([facility]    5. Situational stress-elderly Mom in [place]    6. Seasonal allergic rhinitis due to pollen      Follow-up and Dispositions    · Return in about 6 months (around 6/8/2022) for F/U HTN,CHOL,DM, F/U of obesity, GERD, allergic rhinitis.          lab results and schedule of future lab studies reviewed with patient  reviewed diet, exercise and weight control  cardiovascular risk and specific lipid/LDL goals reviewed  reviewed medications and side effects in detail  Please call my office if there are any questions- [phone]. I will arrange for follow up after the lab tests done from today return  Recommended a weekly \"heart check. \" I went into detail how to do this. Call for refills on medications as needed. Discussed expected course/resolution/complications of diagnosis in detail with patient. Medication risks/benefits/costs/interactions/alternatives discussed with patient. Pt was given an after visit summary which includes diagnoses, current medications & vitals. Pt expressed understanding with the diagnosis and plan      BMI is significantly elevated- in the obese range. I reviewed diet, exercise and weight control. Discussed weight control in detail, the importance of mainly decreased carbs, and for weight maintenance, exercise; discussed different diets and that it isn't as important to watch the type of foods as it is to decrease calorie intake no matter what type of diet you do, etc.       Total 30 minutes  re: Discussed specific diabetic recommendations: low cholesterol diet, weight control and daily exercise discussed, home glucose monitoring emphasized, all medications, side effects and compliance discussed carefully, foot care discussed and Podiatry visits discussed, annual eye examinations at Ophthalmology discussed, glycohemoglobin and other lab monitoring discussed and long term diabetic complications discussed. Reviewed BP, cholesterol and diabetic goals. LDL goal<100,HDL goal>45, Triglyceride goal<150, A1C< 7.0, BP<140/90. Review of  the proper technique of checking the blood pressure- check it on an average day only, not on a stressful day, sitting, no exercise for at least 1 hour and not experiencing any new pain( chronic pain is OK).  Patient encouraged to check BP sitting and standing at least once a month and to report these readings to me if > 140/ 90 on average , or if the standing BP is >  15 points lower than the sitting. Always check it twice and if there is > 5 points decrease from the previous reading( top reading or systolic) keep checking it until it does not drop 5 points. Write only this final reading down, not the preceding readings. If out of these readings there is only 1 out of 4 or less > 068, or > 90 diastolic then your blood pressure is OK; it needs further treatment if it is above this. Also, don't forget,  as noted above, to check your blood pressure standing once a month; this is to detect a drop in your BP that might lead to fainting and serious injury; you check it standing with your arm hanging straight down and relaxed. Check it twice waiting 1 minute between the two readings. If, with either one of these 2 readings there is a > 15 point drop of the systolic compared to your sitting pressure( done before the standing BP), then let me know. Following these guidelines, continue to check your BP and write down only the ones described above and it will help me to effectively treat your blood pressure. Reviewed symptoms, or lack thereof, of hypertension and elevated cholesterol. Regular exercise is very important to your health; it helps mentally, physically, socially; it prevents injuries if done properly. Exercise, even as simple as walking 20-30 minutes daily has major benefits to your health even though your \"numbers\" are the same in the lab. See if you can add this into your daily regimen and after a few months it will become a regular habit-\"just something you do,\" like brushing your teeth.      A combination of aerobic exercise and strengthening and stretching is felt to be the best for you, so this should be your ultimate goal.   This can be done in the privacy of your home or in a group setting as at the gym  Some prefer having a , others prefer to do exercise in groups or individually. Do what \"works\" for you. You need to make it simple and \"fun,\" or you most likely will not continue it. Recommended a weekly \"heart check. \" I went into detail how to do this. Also, discussed symptoms of concern that were noted today in the note above, treatment options( including doing nothing), when to follow up before recommended time frame. Also, answered all questions. I encouraged pt to start taking her Nexium 30-60 minutes before supper and to let us or her GI specialist know if her symptoms increase. Pt is aware that her weight has come back up and is working on that. This document was written by Monica Wilson, as dictated by Bonnie Corey MD.  I have reviewed and agree with the above note and have made corrections where appropriate Jagdeep Valerio M.D.

## 2021-12-09 LAB
ALBUMIN SERPL-MCNC: 3.6 G/DL (ref 3.5–5)
ALBUMIN/GLOB SERPL: 1.2 {RATIO} (ref 1.1–2.2)
ALP SERPL-CCNC: 108 U/L (ref 45–117)
ALT SERPL-CCNC: 43 U/L (ref 12–78)
ANION GAP SERPL CALC-SCNC: 2 MMOL/L (ref 5–15)
AST SERPL-CCNC: 31 U/L (ref 15–37)
BILIRUB SERPL-MCNC: 0.6 MG/DL (ref 0.2–1)
BUN SERPL-MCNC: 12 MG/DL (ref 6–20)
BUN/CREAT SERPL: 13 (ref 12–20)
CALCIUM SERPL-MCNC: 9.3 MG/DL (ref 8.5–10.1)
CHLORIDE SERPL-SCNC: 107 MMOL/L (ref 97–108)
CHOLEST SERPL-MCNC: 195 MG/DL
CO2 SERPL-SCNC: 29 MMOL/L (ref 21–32)
CREAT SERPL-MCNC: 0.95 MG/DL (ref 0.55–1.02)
EST. AVERAGE GLUCOSE BLD GHB EST-MCNC: 120 MG/DL
GLOBULIN SER CALC-MCNC: 3 G/DL (ref 2–4)
GLUCOSE SERPL-MCNC: 130 MG/DL (ref 65–100)
HBA1C MFR BLD: 5.8 % (ref 4–5.6)
HDLC SERPL-MCNC: 81 MG/DL
HDLC SERPL: 2.4 {RATIO} (ref 0–5)
LDLC SERPL CALC-MCNC: 91.4 MG/DL (ref 0–100)
POTASSIUM SERPL-SCNC: 4.8 MMOL/L (ref 3.5–5.1)
PROT SERPL-MCNC: 6.6 G/DL (ref 6.4–8.2)
SODIUM SERPL-SCNC: 138 MMOL/L (ref 136–145)
TRIGL SERPL-MCNC: 113 MG/DL (ref ?–150)
VLDLC SERPL CALC-MCNC: 22.6 MG/DL

## 2022-01-19 DIAGNOSIS — E78.5 HYPERLIPIDEMIA LDL GOAL <100: ICD-10-CM

## 2022-01-19 RX ORDER — PRAVASTATIN SODIUM 40 MG/1
40 TABLET ORAL DAILY
Qty: 90 TABLET | Refills: 1 | Status: SHIPPED | OUTPATIENT
Start: 2022-01-19 | End: 2022-05-28

## 2022-01-19 NOTE — TELEPHONE ENCOUNTER
----- Message from Omar Viramontes. Gabriel Bates sent at 1/19/2022  1:28 PM EST -----  Regarding: renewing the prescription Pravastatin sod  I have been taking 20mg daily, at my last visit Dr. Gabriel Watson changed it to 40mg daily. The prescription needs to be renewed at [facility] can you make sure it is renewed  at the larger dose and I will need it earlier as I have already been taking double of the 20mg.   Thank you,  Taylor Payne [birth year]

## 2022-02-20 DIAGNOSIS — I10 ESSENTIAL HYPERTENSION: ICD-10-CM

## 2022-02-21 RX ORDER — ATENOLOL 50 MG/1
TABLET ORAL
Qty: 90 TABLET | Refills: 1 | Status: SHIPPED | OUTPATIENT
Start: 2022-02-21 | End: 2022-08-05

## 2022-03-18 PROBLEM — Z71.89 ACP (ADVANCE CARE PLANNING): Status: ACTIVE | Noted: 2017-05-02

## 2022-03-19 PROBLEM — H25.811 COMBINED FORMS OF AGE-RELATED CATARACT OF RIGHT EYE: Status: ACTIVE | Noted: 2018-08-28

## 2022-03-19 PROBLEM — E78.5 HYPERLIPIDEMIA LDL GOAL <100: Status: ACTIVE | Noted: 2018-07-11

## 2022-03-20 PROBLEM — H25.812 COMBINED FORMS OF AGE-RELATED CATARACT OF LEFT EYE: Status: ACTIVE | Noted: 2018-09-04

## 2022-03-20 PROBLEM — E66.01 SEVERE OBESITY (BMI 35.0-35.9 WITH COMORBIDITY) (HCC): Status: ACTIVE | Noted: 2020-12-02

## 2022-05-18 NOTE — TELEPHONE ENCOUNTER
----- Message from [id number] sent at 1/30/2019  5:21 PM EST -----  Regarding: Dr. Montes Self: [phone]  [employer] request     Shingrix Vaccine Age 50>    Comments:  Update Shingles vaccine, second dose was received December 14, 2018.     Any time/day Pharmacist provided medication teaching for discharge with a focus on new medications/dose changes.  The discharge medication list was reviewed with the patient/family and the following points were discussed, as applicable: Name, description, purpose, dose/strength, duration of medications, common side effects, food/medications to avoid, action to be taken if dose is missed, when to call MD, safe disposal of unused medications and how to obtain refills.    Clinical Pharmacy Consult:                                                      Transplant Specific:   Date of Transplant: 08/18/2019  Type of Transplant: liver  First Transplant: yes  History of rejection: yes and no    Immunosuppression Regimen   CSA 225mg qAM & 225mg qPM and Myforitic 540mg qAM & 540mg qPM  Immunosuppressant Levels:  Therapeutic  Patient specific goal: 150-300 for csa.   Pt adherent to lab draws: yes  Scr:   Lab Results   Component Value Date    CR 0.75 09/19/2019     Side effects: Diarrhea    Prophylactic Medications  Antibacterial:  Dapsone 100mg daily  Scheduled Discontinue Date: 6 months    Antifungal: Clotrimazole aniyah  Scheduled Discontinue Date: discontinue at discharge.     Antiviral: CrCl >/=60 mL/minute: Valcyte 450mg daily per Liver transplant program guidelines.   Scheduled Discontinue Date: 3 months    Acid Reducer: none at this time   Scheduled Reviewed Date: N/A    Thrombosis Prevention: Aspirin 325 mg PO daily  Scheduled Discontinue Date: Reviewed by tx team.     Blood Pressure Management  Frequency of home Blood Pressure checks: once daily  Most recent home BP: 118/81  Patient Blood pressure goal: <140/90  Patient blood pressure at goal:  yes  Hospitalizations/ER visits since last assessment: 0      Med rec/DUR performed: yes  Med Rec Discrepancies: yes - updated med card.     Pharmacist gave bp monitor, thermometer and 7 day pill organizer.     No further questions for this pharmacist.     Shankar Gore [facility]    Cheek-To-Nose Interpolation Flap Text: A decision was made to reconstruct the defect utilizing an interpolation axial flap and a staged reconstruction.  A telfa template was made of the defect.  This telfa template was then used to outline the Cheek-To-Nose Interpolation flap.  The donor area for the pedicle flap was then injected with anesthesia.  The flap was excised through the skin and subcutaneous tissue down to the layer of the underlying musculature.  The interpolation flap was carefully excised within this deep plane to maintain its blood supply.  The edges of the donor site were undermined.   The donor site was closed in a primary fashion.  The pedicle was then rotated into position and sutured.  Once the tube was sutured into place, adequate blood supply was confirmed with blanching and refill.  The pedicle was then wrapped with xeroform gauze and dressed appropriately with a telfa and gauze bandage to ensure continued blood supply and protect the attached pedicle.

## 2022-05-27 DIAGNOSIS — E78.5 HYPERLIPIDEMIA LDL GOAL <100: ICD-10-CM

## 2022-05-28 RX ORDER — PRAVASTATIN SODIUM 40 MG/1
40 TABLET ORAL DAILY
Qty: 90 TABLET | Refills: 0 | Status: SHIPPED | OUTPATIENT
Start: 2022-05-28 | End: 2022-08-14

## 2022-06-10 ENCOUNTER — OFFICE VISIT (OUTPATIENT)
Dept: FAMILY MEDICINE CLINIC | Age: 72
End: 2022-06-10
Payer: MEDICARE

## 2022-06-10 VITALS
OXYGEN SATURATION: 99 % | HEART RATE: 54 BPM | SYSTOLIC BLOOD PRESSURE: 150 MMHG | DIASTOLIC BLOOD PRESSURE: 70 MMHG | RESPIRATION RATE: 16 BRPM | BODY MASS INDEX: 35.2 KG/M2 | TEMPERATURE: 97.4 F | WEIGHT: 219 LBS | HEIGHT: 66 IN

## 2022-06-10 DIAGNOSIS — F43.9 SITUATIONAL STRESS: ICD-10-CM

## 2022-06-10 DIAGNOSIS — N18.31 STAGE 3A CHRONIC KIDNEY DISEASE (HCC): ICD-10-CM

## 2022-06-10 DIAGNOSIS — Z00.00 MEDICARE ANNUAL WELLNESS VISIT, SUBSEQUENT: ICD-10-CM

## 2022-06-10 DIAGNOSIS — K21.9 GASTROESOPHAGEAL REFLUX DISEASE WITHOUT ESOPHAGITIS: ICD-10-CM

## 2022-06-10 DIAGNOSIS — Z71.89 ACP (ADVANCE CARE PLANNING): ICD-10-CM

## 2022-06-10 DIAGNOSIS — R20.8 OTHER DISTURBANCES OF SKIN SENSATION: ICD-10-CM

## 2022-06-10 DIAGNOSIS — J30.1 SEASONAL ALLERGIC RHINITIS DUE TO POLLEN: ICD-10-CM

## 2022-06-10 DIAGNOSIS — R79.89 ELEVATED FERRITIN: ICD-10-CM

## 2022-06-10 DIAGNOSIS — I10 ESSENTIAL HYPERTENSION: ICD-10-CM

## 2022-06-10 DIAGNOSIS — E78.5 HYPERLIPIDEMIA LDL GOAL <100: ICD-10-CM

## 2022-06-10 DIAGNOSIS — E11.9 DIABETES MELLITUS WITHOUT COMPLICATION (HCC): Primary | ICD-10-CM

## 2022-06-10 DIAGNOSIS — E66.01 SEVERE OBESITY (BMI 35.0-35.9 WITH COMORBIDITY) (HCC): ICD-10-CM

## 2022-06-10 PROBLEM — N18.30 CHRONIC RENAL DISEASE, STAGE III (HCC): Status: ACTIVE | Noted: 2022-06-10

## 2022-06-10 PROCEDURE — G8754 DIAS BP LESS 90: HCPCS | Performed by: FAMILY MEDICINE

## 2022-06-10 PROCEDURE — 1090F PRES/ABSN URINE INCON ASSESS: CPT | Performed by: FAMILY MEDICINE

## 2022-06-10 PROCEDURE — G0439 PPPS, SUBSEQ VISIT: HCPCS | Performed by: FAMILY MEDICINE

## 2022-06-10 PROCEDURE — G8427 DOCREV CUR MEDS BY ELIG CLIN: HCPCS | Performed by: FAMILY MEDICINE

## 2022-06-10 PROCEDURE — G8417 CALC BMI ABV UP PARAM F/U: HCPCS | Performed by: FAMILY MEDICINE

## 2022-06-10 PROCEDURE — 1101F PT FALLS ASSESS-DOCD LE1/YR: CPT | Performed by: FAMILY MEDICINE

## 2022-06-10 PROCEDURE — 2022F DILAT RTA XM EVC RTNOPTHY: CPT | Performed by: FAMILY MEDICINE

## 2022-06-10 PROCEDURE — G8510 SCR DEP NEG, NO PLAN REQD: HCPCS | Performed by: FAMILY MEDICINE

## 2022-06-10 PROCEDURE — G8399 PT W/DXA RESULTS DOCUMENT: HCPCS | Performed by: FAMILY MEDICINE

## 2022-06-10 PROCEDURE — 99215 OFFICE O/P EST HI 40 MIN: CPT | Performed by: FAMILY MEDICINE

## 2022-06-10 PROCEDURE — G8536 NO DOC ELDER MAL SCRN: HCPCS | Performed by: FAMILY MEDICINE

## 2022-06-10 PROCEDURE — G8753 SYS BP > OR = 140: HCPCS | Performed by: FAMILY MEDICINE

## 2022-06-10 PROCEDURE — G0463 HOSPITAL OUTPT CLINIC VISIT: HCPCS | Performed by: FAMILY MEDICINE

## 2022-06-10 PROCEDURE — 3046F HEMOGLOBIN A1C LEVEL >9.0%: CPT | Performed by: FAMILY MEDICINE

## 2022-06-10 PROCEDURE — 1123F ACP DISCUSS/DSCN MKR DOCD: CPT | Performed by: FAMILY MEDICINE

## 2022-06-10 PROCEDURE — 3017F COLORECTAL CA SCREEN DOC REV: CPT | Performed by: FAMILY MEDICINE

## 2022-06-10 PROCEDURE — G9899 SCRN MAM PERF RSLTS DOC: HCPCS | Performed by: FAMILY MEDICINE

## 2022-06-10 RX ORDER — MINERAL OIL
180 ENEMA (ML) RECTAL DAILY
COMMUNITY

## 2022-06-10 RX ORDER — MONTELUKAST SODIUM 10 MG/1
10 TABLET ORAL DAILY
Qty: 90 TABLET | Refills: 1 | Status: SHIPPED | OUTPATIENT
Start: 2022-06-10 | End: 2022-08-17 | Stop reason: SDUPTHER

## 2022-06-10 NOTE — PROGRESS NOTES
HISTORY OF PRESENT ILLNESS  HPI  Surya Lopez is a 62 L. o. female with a history of HTN, GERD, resolved DM( due to previous weight loss), calculus of kidney, dyspareunia, motion sickness, age-related cataracts and hyperlipidemia with LDL goal <100, who presents to the office today c/o itching and for follow up of these health problems. Pt reports she has constant, generalized itching all over her body. She has had this since the end of the winter season. Pt denies any hives or rash. The itching is worse at night. Pt states she sleeps fine but will wake up itching. She was seen by dermatologist and did some lab work. Her ferratin was 395, MCV was 98 and her MCH was 33.5. Pt asks about using B12 as she read it can affect the red blood cells and that was not done at the dermatologist. For her itching, dermatologist placed pt on gabapentin 300 mg TID, taking one tablet when she wakes up, 2 tablets in the afternoon, and 2 tablets before bed. Pt feels there is some relief but not total relief. Pt switched from Zyrtec to Allegra-D after her itching problem came on. Pt is also on Nexium as she was told by dermatologist that her Prilosec could be a cause for her itching, but no relief since it was stopped. Pt mentions her BP is oscillating. At one point she had 120/53 at home, but then she had a BP of 199 one week and then 139 the next week. She has had recent readings of 144/69, 166/71, and 145/95. Pt is not sure if this is related to any event but thinks it could be related to her concern of itching. Pt denies unusual SOB, chest pain, and any recent ER visits or hospitalizations.          Past Medical History:   Diagnosis Date    Adverse effect of anesthesia     treats pt for motion sickness with a patch    AR (allergic rhinitis)     Calculus of kidney     Dyspareunia     GERD (gastroesophageal reflux disease) 11/30/2010    HBP (high blood pressure) 8/9/2010    History of motion sickness 3/29/2014    Hyperlipidemia LDL goal <100 7/11/2018    Incomplete rectal prolapse- asymptomatic-2015- found on GYN exam 8/6/2015    Obesity (BMI 30-39.9) 6/30/2013     Past Surgical History:   Procedure Laterality Date    COLONOSCOPY N/A 10/4/2017    COLONOSCOPY performed by Sophie Carroll MD at [facility] ENDOSCOPY    HX CATARACT REMOVAL Right 08/2018    left 09/18    HX CHOLECYSTECTOMY  1987    HX COLONOSCOPY  2007    repeat in 2017    HX HEENT Right     macular hole repair on right eye    HX KNEE ARTHROSCOPY Left 02/15/2017    meniscus     Current Outpatient Medications on File Prior to Visit   Medication Sig Dispense Refill    fexofenadine (ALLEGRA) 180 mg tablet Take 180 mg by mouth daily.  pravastatin (PRAVACHOL) 40 mg tablet TAKE 1 TABLET BY MOUTH  DAILY 90 Tablet 0    atenoloL (TENORMIN) 50 mg tablet TAKE 1 TABLET BY MOUTH  DAILY 90 Tablet 1    esomeprazole (NexIUM) 20 mg capsule Take 20 mg by mouth daily.  omega 3-dha-epa-fish oil (FISH OIL) 100-160-1,000 mg cap Fish Oil      cholecalciferol (VITAMIN D3) 1,000 unit cap Vitamin D3      LACTOBACILLUS ACIDOPHILUS (PROBIOTIC PO) Take  by mouth daily. No current facility-administered medications on file prior to visit.      Allergies   Allergen Reactions    Doxycycline Rash    Sulfa (Sulfonamide Antibiotics) Rash    Tetracycline Rash     Family History   Problem Relation Age of Onset    Hypertension Mother 80    Elevated Lipids Mother     Arrhythmia Mother         ammy Michaels    Alcohol abuse Father 68    Arrhythmia Paternal Grandmother 80        had a pacemaker    Heart Attack Paternal Grandfather 79    Cancer Other         melanoma in daughter     Social History     Socioeconomic History    Marital status:    Tobacco Use    Smoking status: Never Smoker    Smokeless tobacco: Never Used   Vaping Use    Vaping Use: Never used   Substance and Sexual Activity    Alcohol use: Yes     Comment: occ    Drug use: No    Sexual activity: Yes Other Topics Concern     Service No    Blood Transfusions No    Caffeine Concern No    Occupational Exposure No    Hobby Hazards No    Sleep Concern No    Stress Concern No    Weight Concern Yes    Special Diet No    Back Care No    Exercise Yes     Comment: walking    Bike Helmet No     Comment: does not ride   [address] Yes    Self-Exams Yes                   Review of Systems   Constitutional: Negative for chills, diaphoresis, fever, malaise/fatigue and weight loss. Eyes: Negative for blurred vision, double vision, pain and redness. Respiratory: Negative for cough, shortness of breath and wheezing. Cardiovascular: Negative for chest pain, palpitations, orthopnea, claudication, leg swelling and PND. Skin: Positive for itching. Negative for rash. Neurological: Negative for dizziness, tingling, tremors, sensory change, speech change, focal weakness, seizures, loss of consciousness, weakness and headaches.      Results for orders placed or performed in visit on 06/10/22   IRON PROFILE   Result Value Ref Range    Iron 118 35 - 150 ug/dL    TIBC 352 250 - 450 ug/dL    Iron % saturation 34 20 - 50 %   FERRITIN   Result Value Ref Range    Ferritin 232 8 - 252 NG/ML   LIPID PANEL   Result Value Ref Range    Cholesterol, total 192 <200 MG/DL    Triglyceride 119 <150 MG/DL    HDL Cholesterol 81 MG/DL    LDL, calculated 87.2 0 - 100 MG/DL    VLDL, calculated 23.8 MG/DL    CHOL/HDL Ratio 2.4 0.0 - 5.0     METABOLIC PANEL, COMPREHENSIVE   Result Value Ref Range    Sodium 138 136 - 145 mmol/L    Potassium 5.2 (H) 3.5 - 5.1 mmol/L    Chloride 105 97 - 108 mmol/L    CO2 28 21 - 32 mmol/L    Anion gap 5 5 - 15 mmol/L    Glucose 146 (H) 65 - 100 mg/dL    BUN 11 6 - 20 MG/DL    Creatinine 0.90 0.55 - 1.02 MG/DL    BUN/Creatinine ratio 12 12 - 20      GFR est AA >60 >60 ml/min/1.73m2    GFR est non-AA >60 >60 ml/min/1.73m2    Calcium 9.5 8.5 - 10.1 MG/DL    Bilirubin, total 1.0 0.2 - 1.0 MG/DL ALT (SGPT) 48 12 - 78 U/L    AST (SGOT) 43 (H) 15 - 37 U/L    Alk. phosphatase 109 45 - 117 U/L    Protein, total 6.9 6.4 - 8.2 g/dL    Albumin 3.8 3.5 - 5.0 g/dL    Globulin 3.1 2.0 - 4.0 g/dL    A-G Ratio 1.2 1.1 - 2.2     MICROALBUMIN, UR, RAND W/ MICROALB/CREAT RATIO   Result Value Ref Range    Microalbumin,urine random 0.67 MG/DL    Creatinine, urine 110.00 mg/dL    Microalbumin/Creat ratio (mg/g creat) 6 0 - 30 mg/g             Physical Exam  Visit Vitals  BP (!) 150/70 (BP 1 Location: Right upper arm, BP Patient Position: Sitting, BP Cuff Size: Large adult)   Pulse (!) 54   Temp 97.4 °F (36.3 °C) (Temporal)   Resp 16   Ht 5' 6\" (1.676 m)   Wt 219 lb (99.3 kg)   SpO2 99%   BMI 35.35 kg/m²         Head: Normocephalic, without obvious abnormality, atraumatic  Eyes: conjunctivae/corneas clear. PERRL, EOM's intact. Neck: supple, symmetrical, trachea midline, no adenopathy, thyroid: not enlarged, symmetric, no tenderness/mass/nodules, no carotid bruit and no JVD  Lungs: clear to auscultation bilaterally  Heart: regular rate and rhythm, S1, S2 normal, no murmur, click, rub or gallop  Extremities: extremities normal, atraumatic, no cyanosis or edema  Pulses: 2+ and symmetric  Lymph nodes: Cervical, supraclavicular, and axillary nodes normal.  Neurologic: Grossly normal      ASSESSMENT and PLAN    ICD-10-CM ICD-9-CM    1. Diabetes mellitus without complication (HCC)  H39.5 250.00 MICROALBUMIN, UR, RAND W/ MICROALB/CREAT RATIO      MICROALBUMIN, UR, RAND W/ MICROALB/CREAT RATIO      CANCELED: HEMOGLOBIN A1C WITH EAG      CANCELED: HEMOGLOBIN A1C WITH EAG   2. Hyperlipidemia LDL goal <100  E78.5 272.4 LIPID PANEL      LIPID PANEL   3. Essential hypertension  G15 608.5 METABOLIC PANEL, COMPREHENSIVE      METABOLIC PANEL, COMPREHENSIVE      CANCELED: CBC W/O DIFF      CANCELED: CBC W/O DIFF   4. Situational stress-elderly Mom in [place]  F43.9 V62.89    5. Stage 3a chronic kidney disease (HCC)  N18.31 585.3    6.  Severe obesity (BMI 35.0-35.9 with comorbidity) ([facility])  E66.01 278.01     Z68.35 V85.35    7. ACP (advance care planning)  Z71.89 V65.49    8. Seasonal allergic rhinitis due to pollen  J30.1 477.0 montelukast (SINGULAIR) 10 mg tablet   9. Medicare annual wellness visit, subsequent  Z00.00 V70.0    10. Gastroesophageal reflux disease without esophagitis  K21.9 530.81    11. Elevated ferritin  R79.89 790.6 FERRITIN      IRON PROFILE      IRON PROFILE      FERRITIN      CANCELED: VITAMIN B12      CANCELED: VITAMIN B12   12. Other disturbances of skin sensation   R20.8 782.0 CANCELED: VITAMIN B12      CANCELED: VITAMIN B12    itching     Diagnoses and all orders for this visit:    1. Diabetes mellitus without complication ([facility])  -     MICROALBUMIN, UR, RAND W/ MICROALB/CREAT RATIO; Future    2. Hyperlipidemia LDL goal <100  -     LIPID PANEL; Future    3. Essential hypertension  -     METABOLIC PANEL, COMPREHENSIVE; Future    4. Situational stress-elderly Mom in [place]    5. Stage 3a chronic kidney disease ([facility] [address]    6. Severe obesity (BMI 35.0-35.9 with comorbidity) ([facility] [address]    7. ACP (advance care planning)    8. Seasonal allergic rhinitis due to pollen  -     montelukast (SINGULAIR) 10 mg tablet; Take 1 Tablet by mouth daily. 9. Medicare annual wellness visit, subsequent    10. Gastroesophageal reflux disease without esophagitis    11. Elevated ferritin  -     FERRITIN; Future  -     IRON PROFILE; Future    12. Other disturbances of skin sensation   Comments:  itching    Other orders  -     VITAMIN B12      Follow-up and Dispositions    · Return in about 3 months (around 9/10/2022) for F/U HTN and CHOL, F/U DM, F/U of obesity, osteoarthritis, itching.        lab results and schedule of future lab studies reviewed with patient  reviewed diet, exercise and weight control  cardiovascular risk and specific lipid/LDL goals reviewed  reviewed medications and side effects in detail  Please call my office if there are any questions- [phone]. I will arrange for follow up after the lab tests done from today return  Recommended a weekly \"heart check. \" I went into detail how to do this. Call for refills on medications as needed. Discussed expected course/resolution/complications of diagnosis in detail with patient. Medication risks/benefits/costs/interactions/alternatives discussed with patient. Pt was given an after visit summary which includes diagnoses, current medications & vitals. Pt expressed understanding with the diagnosis and plan      BMI is significantly elevated- in the obese range. I reviewed diet, exercise and weight control. Discussed weight control in detail, the importance of mainly decreased carbs, and for weight maintenance, exercise; discussed different diets and that it isn't as important to watch the type of foods as it is to decrease calorie intake no matter what type of diet you do, etc.       Total 30 minutes  re: Recommended a weekly \"heart check. \" I went into detail how to do this. Regular exercise is very important to your health; it helps mentally, physically, socially; it prevents injuries if done properly. Exercise, even as simple as walking 20-30 minutes daily has major benefits to your health even though your \"numbers\" are the same in the lab. See if you can add this into your daily regimen and after a few months it will become a regular habit-\"just something you do,\" like brushing your teeth. A combination of aerobic exercise and strengthening and stretching is felt to be the best for you, so this should be your ultimate goal.   This can be done in the privacy of your home or in a group setting as at the gym  Some prefer having a , others prefer to do exercise in groups or individually. Do what \"works\" for you. You need to make it simple and \"fun,\" or you most likely will not continue it. Reviewed symptoms, or lack thereof, of hypertension and elevated cholesterol.      Review of  the proper technique of checking the blood pressure- check it on an average day only, not on a stressful day, sitting, no exercise for at least 1 hour and not experiencing any new pain( chronic pain is OK). Patient encouraged to check BP sitting and standing at least once a month and to report these readings to me if > 140/ 90 on average , or if the standing BP is >  15 points lower than the sitting. Always check it twice and if there is > 5 points decrease from the previous reading( top reading or systolic) keep checking it until it does not drop 5 points. Write only this final reading down, not the preceding readings. If out of these readings there is only 1 out of 4 or less > 492, or > 90 diastolic then your blood pressure is OK; it needs further treatment if it is above this. Also, don't forget,  as noted above, to check your blood pressure standing once a month; this is to detect a drop in your BP that might lead to fainting and serious injury; you check it standing with your arm hanging straight down and relaxed. Check it twice waiting 1 minute between the two readings. If, with either one of these 2 readings there is a > 15 point drop of the systolic compared to your sitting pressure( done before the standing BP), then let me know. Following these guidelines, continue to check your BP and write down only the ones described above and it will help me to effectively treat your blood pressure. Also, discussed symptoms of concern that were noted today in the note above, treatment options( including doing nothing), when to follow up before recommended time frame. Also, answered all questions. For her itching, we decided to try Singulair as she has not tried it yet. I encouraged her to change the time of day she takes her gabapentin. Instead of taking 3 separate times, she can take it 5 separate times because she is taking 5 pills of the gabapentin a day.  Because she had the above borderline/ abnormal lab test, we checked the B12, ferratin, and iron profile. Also spent significant amount of time talking about weight control. She is down from her weight 2 years ago, and looking back for the past 12 years, she has maintained her weight and actually has lost 6-7 lbs since the 12 years. At times, she has lost weight down to 205 lb, but has not been able to keep it off, so we talked about mindful eating to see if that might help her. This document was written by Jennifer Calvin, as dictated by Eva Alberts MD.   I have reviewed and agree with the above note and have made corrections where appropriate Jagdeep Reyez M.D. This is the Subsequent Medicare Annual Wellness Exam, performed 12 months or more after the Initial AWV or the last Subsequent AWV    I have reviewed the patient's medical history in detail and updated the computerized patient record. Assessment/Plan   Education and counseling provided:  Are appropriate based on today's review and evaluation    1. Diabetes mellitus without complication (HCC)  -     MICROALBUMIN, UR, RAND W/ MICROALB/CREAT RATIO; Future  2. Hyperlipidemia LDL goal <100  -     LIPID PANEL; Future  3. Essential hypertension  -     METABOLIC PANEL, COMPREHENSIVE; Future  4. Situational stress-elderly Mom in [place]  5. Stage 3a chronic kidney disease ([facility] [address]  6. Severe obesity (BMI 35.0-35.9 with comorbidity) ([facility] [address]  7. ACP (advance care planning)  8. Seasonal allergic rhinitis due to pollen  -     montelukast (SINGULAIR) 10 mg tablet; Take 1 Tablet by mouth daily. , Normal, Disp-90 Tablet, R-1  9. Medicare annual wellness visit, subsequent  10. Gastroesophageal reflux disease without esophagitis  11. Elevated ferritin  -     FERRITIN; Future  -     IRON PROFILE; Future  12.  Other disturbances of skin sensation   Comments:  itching       Depression Risk Factor Screening     3 most recent PHQ Screens 6/10/2022   Little interest or pleasure in doing things Not at all   Feeling down, depressed, irritable, or hopeless Not at all   Total Score PHQ 2 0       Alcohol & Drug Abuse Risk Screen    Do you average more than 1 drink per night or more than 7 drinks a week:  No    On any one occasion in the past three months have you have had more than 3 drinks containing alcohol:  No          Functional Ability and Level of Safety    Hearing: Hearing is good. Activities of Daily Living: The home contains: no safety equipment. Patient does total self care      Ambulation: with no difficulty     Fall Risk:  Fall Risk Assessment, last 12 mths 6/10/2022   Able to walk? Yes   Fall in past 12 months? 0   Do you feel unsteady? 0   Are you worried about falling 0   Number of falls in past 12 months -   Fall with injury? -      Abuse Screen:  Patient is not abused       Cognitive Screening    Has your family/caregiver stated any concerns about your memory: no     Cognitive Screening: Normal - Mini Cog Test   Advanced directive already completed and in the chart. No changes desired. Health Maintenance Due   There are no preventive care reminders to display for this patient. Patient Care Team   Patient Care Team:  Aron Correa MD as PCP - Davin Loza MD as PCP - [facility] Kel Schneider Empaneled Provider    History     Patient Active Problem List   Diagnosis Code    Dyspareunia [id number]    Calculus of kidney N20.0    Obesity (BMI 30-39. 9) E66.9    Situational stress-elderly Mom in [place] F43.9    History of motion sickness Z87.898    Incomplete rectal prolapse- asymptomatic-2015- found on GYN exam K62.3    Diabetes mellitus without complication ([facility] [address]- 1872 E11.9    Allergic rhinitis due to pollen J30.1    Gastroesophageal reflux disease without esophagitis K21.9    Benign papule- anterior to right pinna12/2015 R23.8    ACP (advance care planning) Z71.89    Hyperlipidemia LDL goal <100 E78.5    Combined forms of age-related cataract of right eye H25.811    Combined forms of age-related cataract of left eye H25.812    HBP (high blood pressure) I10    AR (allergic rhinitis) J30.9    Severe obesity (BMI 35.0-35.9 with comorbidity) ([facility]) E66.01, Z68.35    Chronic renal disease, stage III N18.30     Past Medical History:   Diagnosis Date    Adverse effect of anesthesia     treats pt for motion sickness with a patch    AR (allergic rhinitis)     Calculus of kidney     Dyspareunia     GERD (gastroesophageal reflux disease) 11/30/2010    HBP (high blood pressure) 8/9/2010    History of motion sickness 3/29/2014    Hyperlipidemia LDL goal <100 7/11/2018    Incomplete rectal prolapse- asymptomatic-2015- found on GYN exam 8/6/2015    Obesity (BMI 30-39.9) 6/30/2013      Past Surgical History:   Procedure Laterality Date    COLONOSCOPY N/A 10/4/2017    COL[facility] performed by Lynn Hill [facility] at [facility] ENDOSCOPY    HX CATARACT REMOVAL Right 08/2018    left 09/18    HX CHOLECYSTECTOMY  1987    HX COLONOSCOPY  2007    repeat in 2017    HX HEENT Right     macular hole repair on right eye    HX KNEE ARTHROSCOPY Left 02/15/2017    meniscus     Current Outpatient Medications   Medication Sig Dispense Refill    fexofenadine (ALLEGRA) 180 mg tablet Take 180 mg by mouth daily.  montelukast (SINGULAIR) 10 mg tablet Take 1 Tablet by mouth daily. 90 Tablet 1    pravastatin (PRAVACHOL) 40 mg tablet TAKE 1 TABLET BY MOUTH  DAILY 90 Tablet 0    atenoloL (TENORMIN) 50 mg tablet TAKE 1 TABLET BY MOUTH  DAILY 90 Tablet 1    esomeprazole (NexIUM) 20 mg capsule Take 20 mg by mouth daily.  omega 3-dha-epa-fish oil (FISH OIL) 100-160-1,000 mg cap Fish Oil      cholecalciferol (VITAMIN D3) 1,000 unit cap Vitamin D3      LACTOBACILLUS ACIDOPHILUS (PROBIOTIC PO) Take  by mouth daily.        Allergies   Allergen Reactions    Doxycycline Rash    Sulfa (Sulfonamide Antibiotics) Rash    Tetracycline Rash       Family History   Problem Relation Age of Onset    Hypertension Mother 80    Elevated Lipids Mother     Arrhythmia Mother         ammy Almanza    Alcohol abuse Father 68    Arrhythmia Paternal Grandmother 80        had a pacemaker    Heart Attack Paternal Grandfather 79    Cancer Other         melanoma in daughter     Social History     Tobacco Use    Smoking status: Never Smoker    Smokeless tobacco: Never Used   Substance Use Topics    Alcohol use: Yes     Comment: Jeimy Barry MD

## 2022-06-11 LAB
ALBUMIN SERPL-MCNC: 3.8 G/DL (ref 3.5–5)
ALBUMIN/GLOB SERPL: 1.2 {RATIO} (ref 1.1–2.2)
ALP SERPL-CCNC: 109 U/L (ref 45–117)
ALT SERPL-CCNC: 48 U/L (ref 12–78)
ANION GAP SERPL CALC-SCNC: 5 MMOL/L (ref 5–15)
AST SERPL-CCNC: 43 U/L (ref 15–37)
BILIRUB SERPL-MCNC: 1 MG/DL (ref 0.2–1)
BUN SERPL-MCNC: 11 MG/DL (ref 6–20)
BUN/CREAT SERPL: 12 (ref 12–20)
CALCIUM SERPL-MCNC: 9.5 MG/DL (ref 8.5–10.1)
CHLORIDE SERPL-SCNC: 105 MMOL/L (ref 97–108)
CHOLEST SERPL-MCNC: 192 MG/DL
CO2 SERPL-SCNC: 28 MMOL/L (ref 21–32)
CREAT SERPL-MCNC: 0.9 MG/DL (ref 0.55–1.02)
CREAT UR-MCNC: 110 MG/DL
FERRITIN SERPL-MCNC: 232 NG/ML (ref 8–252)
GLOBULIN SER CALC-MCNC: 3.1 G/DL (ref 2–4)
GLUCOSE SERPL-MCNC: 146 MG/DL (ref 65–100)
HDLC SERPL-MCNC: 81 MG/DL
HDLC SERPL: 2.4 {RATIO} (ref 0–5)
IRON SATN MFR SERPL: 34 % (ref 20–50)
IRON SERPL-MCNC: 118 UG/DL (ref 35–150)
LDLC SERPL CALC-MCNC: 87.2 MG/DL (ref 0–100)
MICROALBUMIN UR-MCNC: 0.67 MG/DL
MICROALBUMIN/CREAT UR-RTO: 6 MG/G (ref 0–30)
POTASSIUM SERPL-SCNC: 5.2 MMOL/L (ref 3.5–5.1)
PROT SERPL-MCNC: 6.9 G/DL (ref 6.4–8.2)
SODIUM SERPL-SCNC: 138 MMOL/L (ref 136–145)
TIBC SERPL-MCNC: 352 UG/DL (ref 250–450)
TRIGL SERPL-MCNC: 119 MG/DL (ref ?–150)
VLDLC SERPL CALC-MCNC: 23.8 MG/DL

## 2022-06-11 NOTE — PATIENT INSTRUCTIONS
Medicare Wellness Visit, Female     The best way to live healthy is to have a lifestyle where you eat a well-balanced diet, exercise regularly, limit alcohol use, and quit all forms of tobacco/nicotine, if applicable. Regular preventive services are another way to keep healthy. Preventive services (vaccines, screening tests, monitoring & exams) can help personalize your care plan, which helps you manage your own care. Screening tests can find health problems at the earliest stages, when they are easiest to treat. [facility] follows the current, evidence-based guidelines published by the [facility] Araujo ([facility]) when recommending preventive services for our patients. Because we follow these guidelines, sometimes recommendations change over time as research supports it. (For example, mammograms used to be recommended annually. Even though Medicare will still pay for an annual mammogram, the newer guidelines recommend a mammogram every two years for women of average risk). Of course, you and your doctor may decide to screen more often for some diseases, based on your risk and your co-morbidities (chronic disease you are already diagnosed with). Preventive services for you include:  - Medicare offers their members a free annual wellness visit, which is time for you and your primary care provider to discuss and plan for your preventive service needs. Take advantage of this benefit every year!  -All adults over the age of 72 should receive the recommended pneumonia vaccines. Current USPSTF guidelines recommend a series of two vaccines for the best pneumonia protection.   -All adults should have a flu vaccine yearly and a tetanus vaccine every 10 years.   -All adults age 48 and older should receive the shingles vaccines (series of two vaccines).       -All adults age 38-68 who are overweight should have a diabetes screening test once every three years.   -All adults born between [birth year] and 1965 should be screened once for Hepatitis C.  -Other screening tests and preventive services for persons with diabetes include: an eye exam to screen for diabetic retinopathy, a kidney function test, a foot exam, and stricter control over your cholesterol.   -Cardiovascular screening for adults with routine risk involves an electrocardiogram (ECG) at intervals determined by your doctor.   -Colorectal cancer screenings should be done for adults age 54-65 with no increased risk factors for colorectal cancer. There are a number of acceptable methods of screening for this type of cancer. Each test has its own benefits and drawbacks. Discuss with your doctor what is most appropriate for you during your annual wellness visit. The different tests include: colonoscopy (considered the best screening method), a fecal occult blood test, a fecal DNA test, and sigmoidoscopy.    -A bone mass density test is recommended when a woman turns 65 to screen for osteoporosis. This test is only recommended one time, as a screening. Some providers will use this same test as a disease monitoring tool if you already have osteoporosis. -Breast cancer screenings are recommended every other year for women of normal risk, age 54-69.  -Cervical cancer screenings for women over age 72 are only recommended with certain risk factors. Here is a list of your current Health Maintenance items (your personalized list of preventive services) with a due date: There are no preventive care reminders to display for this patient.

## 2022-06-12 ENCOUNTER — PATIENT MESSAGE (OUTPATIENT)
Dept: FAMILY MEDICINE CLINIC | Age: 72
End: 2022-06-12

## 2022-06-12 DIAGNOSIS — R20.8 OTHER DISTURBANCES OF SKIN SENSATION: Primary | ICD-10-CM

## 2022-06-12 DIAGNOSIS — R73.09 ELEVATED GLUCOSE: ICD-10-CM

## 2022-06-14 ENCOUNTER — TELEPHONE (OUTPATIENT)
Dept: FAMILY MEDICINE CLINIC | Age: 72
End: 2022-06-14

## 2022-06-15 ENCOUNTER — APPOINTMENT (OUTPATIENT)
Dept: FAMILY MEDICINE CLINIC | Age: 72
End: 2022-06-15

## 2022-06-15 DIAGNOSIS — R73.09 ELEVATED GLUCOSE: ICD-10-CM

## 2022-06-15 DIAGNOSIS — R20.8 OTHER DISTURBANCES OF SKIN SENSATION: ICD-10-CM

## 2022-06-16 LAB
EST. AVERAGE GLUCOSE BLD GHB EST-MCNC: 131 MG/DL
HBA1C MFR BLD: 6.2 % (ref 4–5.6)
VIT B12 SERPL-MCNC: 414 PG/ML (ref 193–986)

## 2022-06-22 ENCOUNTER — HOSPITAL ENCOUNTER (OUTPATIENT)
Dept: GENERAL RADIOLOGY | Age: 72
Discharge: HOME | End: 2022-06-22
Payer: MEDICARE

## 2022-06-22 ENCOUNTER — TRANSCRIBE ORDER (OUTPATIENT)
Dept: REGISTRATION | Age: 72
End: 2022-06-22

## 2022-06-22 DIAGNOSIS — Z12.2 SCREENING FOR LUNG CANCER: ICD-10-CM

## 2022-06-22 DIAGNOSIS — Z12.2 SCREENING FOR LUNG CANCER: Primary | ICD-10-CM

## 2022-06-22 PROCEDURE — 71046 X-RAY EXAM CHEST 2 VIEWS: CPT

## 2022-07-30 DIAGNOSIS — I10 ESSENTIAL HYPERTENSION: ICD-10-CM

## 2022-08-05 RX ORDER — ATENOLOL 50 MG/1
TABLET ORAL
Qty: 90 TABLET | Refills: 3 | Status: SHIPPED | OUTPATIENT
Start: 2022-08-05

## 2022-08-12 DIAGNOSIS — E78.5 HYPERLIPIDEMIA LDL GOAL <100: ICD-10-CM

## 2022-08-14 RX ORDER — PRAVASTATIN SODIUM 40 MG/1
40 TABLET ORAL DAILY
Qty: 90 TABLET | Refills: 3 | Status: SHIPPED | OUTPATIENT
Start: 2022-08-14

## 2022-08-17 DIAGNOSIS — J30.1 SEASONAL ALLERGIC RHINITIS DUE TO POLLEN: ICD-10-CM

## 2022-08-17 NOTE — TELEPHONE ENCOUNTER
Change in pharmacy to [facility]. Thanks, Rolf Scherer    Last Visit: 6/10/22 MD Xavi Elias  Next Appointment: 9/28/22 MD Xavi Elias  Previous Refill Encounter(s): 6/10/22 [facility] + [facility]    Requested Prescriptions     Pending Prescriptions Disp Refills    montelukast (SINGULAIR) 10 mg tablet 90 Tablet 1     Sig: Take 1 Tablet by mouth daily. For [facility] in place:   Recommendation Provided To:    Intervention Detail: New Rx: 1, reason: Patient Preference  Gap Closed?:   Intervention Accepted By:   Time Spent (min): 5

## 2022-08-18 RX ORDER — MONTELUKAST SODIUM 10 MG/1
10 TABLET ORAL DAILY
Qty: 90 TABLET | Refills: 1 | Status: SHIPPED | OUTPATIENT
Start: 2022-08-18 | End: 2022-10-01 | Stop reason: ALTCHOICE

## 2022-09-30 ENCOUNTER — OFFICE VISIT (OUTPATIENT)
Dept: FAMILY MEDICINE CLINIC | Age: 72
End: 2022-09-30
Payer: MEDICARE

## 2022-09-30 DIAGNOSIS — I10 ESSENTIAL HYPERTENSION: ICD-10-CM

## 2022-09-30 DIAGNOSIS — E66.9 OBESITY (BMI 30-39.9): ICD-10-CM

## 2022-09-30 DIAGNOSIS — E78.5 HYPERLIPIDEMIA LDL GOAL <100: Primary | ICD-10-CM

## 2022-09-30 DIAGNOSIS — E11.9 DIABETES MELLITUS WITHOUT COMPLICATION (HCC): ICD-10-CM

## 2022-09-30 DIAGNOSIS — L12.0 BULLOUS PEMPHIGOID: ICD-10-CM

## 2022-09-30 DIAGNOSIS — J30.1 SEASONAL ALLERGIC RHINITIS DUE TO POLLEN: ICD-10-CM

## 2022-09-30 DIAGNOSIS — K21.9 GASTROESOPHAGEAL REFLUX DISEASE WITHOUT ESOPHAGITIS: ICD-10-CM

## 2022-09-30 PROCEDURE — G8536 NO DOC ELDER MAL SCRN: HCPCS | Performed by: FAMILY MEDICINE

## 2022-09-30 PROCEDURE — G8752 SYS BP LESS 140: HCPCS | Performed by: FAMILY MEDICINE

## 2022-09-30 PROCEDURE — 1090F PRES/ABSN URINE INCON ASSESS: CPT | Performed by: FAMILY MEDICINE

## 2022-09-30 PROCEDURE — 3017F COLORECTAL CA SCREEN DOC REV: CPT | Performed by: FAMILY MEDICINE

## 2022-09-30 PROCEDURE — G8427 DOCREV CUR MEDS BY ELIG CLIN: HCPCS | Performed by: FAMILY MEDICINE

## 2022-09-30 PROCEDURE — G8510 SCR DEP NEG, NO PLAN REQD: HCPCS | Performed by: FAMILY MEDICINE

## 2022-09-30 PROCEDURE — G8417 CALC BMI ABV UP PARAM F/U: HCPCS | Performed by: FAMILY MEDICINE

## 2022-09-30 PROCEDURE — 2022F DILAT RTA XM EVC RTNOPTHY: CPT | Performed by: FAMILY MEDICINE

## 2022-09-30 PROCEDURE — G8754 DIAS BP LESS 90: HCPCS | Performed by: FAMILY MEDICINE

## 2022-09-30 PROCEDURE — G8399 PT W/DXA RESULTS DOCUMENT: HCPCS | Performed by: FAMILY MEDICINE

## 2022-09-30 PROCEDURE — 3044F HG A1C LEVEL LT 7.0%: CPT | Performed by: FAMILY MEDICINE

## 2022-09-30 PROCEDURE — G0463 HOSPITAL OUTPT CLINIC VISIT: HCPCS | Performed by: FAMILY MEDICINE

## 2022-09-30 PROCEDURE — 1101F PT FALLS ASSESS-DOCD LE1/YR: CPT | Performed by: FAMILY MEDICINE

## 2022-09-30 PROCEDURE — 99215 OFFICE O/P EST HI 40 MIN: CPT | Performed by: FAMILY MEDICINE

## 2022-09-30 PROCEDURE — 1123F ACP DISCUSS/DSCN MKR DOCD: CPT | Performed by: FAMILY MEDICINE

## 2022-09-30 PROCEDURE — G9899 SCRN MAM PERF RSLTS DOC: HCPCS | Performed by: FAMILY MEDICINE

## 2022-09-30 RX ORDER — DUPILUMAB 300 MG/2ML
INJECTION, SOLUTION SUBCUTANEOUS
COMMUNITY
Start: 2022-08-11

## 2022-09-30 NOTE — PROGRESS NOTES
HISTORY OF PRESENT ILLNESS  HPI  Sebastián Esquivel is a 67 y.o. female with a history of HTN, GERD, bullous pemphigoid, resolved DM( due to previous weight loss), calculus of kidney, dyspareunia, stage III CKD, motion sickness, age-related cataracts and hyperlipidemia with LDL goal <100, who presents to the office today for follow up of these health problems. Pt requests to review her recent lab work    Pt notes she was diagnosed with bullous pemphigoid by her dermatologist. She was being seen for her itching and no other associative symptoms. She had been treating that with gabapentin but she has more relief with Dupixent. She gets her Dupixent injection every other week and is weaning off the gabapentin to see if she can have the same level of relief without it. Pt is down to 50 mg gabapentin daily. Pt reports her BP averages 130-140/75 at home. Pt denies unusual SOB, chest pain, and any recent ER visits or hospitalizations.        Past Medical History:   Diagnosis Date    Adverse effect of anesthesia     treats pt for motion sickness with a patch    AR (allergic rhinitis)     Bullous pemphigoid 09/2022    Calculus of kidney     Dyspareunia     GERD (gastroesophageal reflux disease) 11/30/2010    HBP (high blood pressure) 08/09/2010    History of motion sickness 03/29/2014    Hyperlipidemia LDL goal <100 07/11/2018    Incomplete rectal prolapse- asymptomatic-2015- found on GYN exam 08/06/2015    Obesity (BMI 30-39.9) 06/30/2013     Past Surgical History:   Procedure Laterality Date    COLONOSCOPY N/A 10/4/2017    COLONOSCOPY performed by Jaye Crocker MD at [facility] ENDOSCOPY    HX CATARACT REMOVAL Right 08/2018    left 09/18    HX CHOLECYSTECTOMY  1987    HX COLONOSCOPY  2007    repeat in 2017    HX HEENT Right     macular hole repair on right eye    HX KNEE ARTHROSCOPY Left 02/15/2017    meniscus     Current Outpatient Medications on File Prior to Visit   Medication Sig Dispense Refill    Dupixent Pen 300 mg/2 mL pnij       pravastatin (PRAVACHOL) 40 mg tablet TAKE 1 TABLET BY MOUTH  DAILY 90 Tablet 3    atenoloL (TENORMIN) 50 mg tablet TAKE 1 TABLET BY MOUTH  DAILY 90 Tablet 3    fexofenadine (ALLEGRA) 180 mg tablet Take 180 mg by mouth daily. esomeprazole (NEXIUM) 20 mg capsule Take 20 mg by mouth daily. omega 3-dha-epa-fish oil 100-160-1,000 mg cap Fish Oil      cholecalciferol (VITAMIN D3) 25 mcg (1,000 unit) cap Vitamin D3      LACTOBACILLUS ACIDOPHILUS (PROBIOTIC PO) Take  by mouth daily. [DISCONTINUED] montelukast (SINGULAIR) 10 mg tablet Take 1 Tablet by mouth daily. 90 Tablet 1     No current facility-administered medications on file prior to visit.      Allergies   Allergen Reactions    Doxycycline Rash    Sulfa (Sulfonamide Antibiotics) Rash    Tetracycline Rash     Family History   Problem Relation Age of Onset    Hypertension Mother 80    Elevated Lipids Mother     Arrhythmia Mother         a. fib    Alcohol abuse Father 68    Arrhythmia Paternal Grandmother 80        had a pacemaker    Heart Attack Paternal Grandfather 79    Cancer Other         melanoma in daughter     Social History     Socioeconomic History    Marital status:    Tobacco Use    Smoking status: Never    Smokeless tobacco: Never   Vaping Use    Vaping Use: Never used   Substance and Sexual Activity    Alcohol use: Yes     Comment: occ    Drug use: No    Sexual activity: Yes   Other Topics Concern     Service No    Blood Transfusions No    Caffeine Concern No    Occupational Exposure No    Hobby Hazards No    Sleep Concern No    Stress Concern No    Weight Concern Yes    Special Diet No    Back Care No    Exercise Yes     Comment: walking    Bike Helmet No     Comment: does not ride    Seat Belt Yes    Self-Exams Yes     Social Determinants of Health     Financial Resource Strain: Low Risk     Difficulty of Paying Living Expenses: Not hard at all   Food Insecurity: No Food Insecurity    Worried About Running Out of Food in the Last Year: Never true    Ran Out of Food in the Last Year: Never true               Review of Systems   Constitutional:  Negative for chills, diaphoresis, fever, malaise/fatigue and weight loss. Eyes:  Negative for blurred vision, double vision, pain and redness. Respiratory:  Negative for cough, shortness of breath and wheezing. Cardiovascular:  Negative for chest pain, palpitations, orthopnea, claudication, leg swelling and PND. Genitourinary:  Negative for frequency. Skin:  Negative for itching and rash. Neurological:  Negative for dizziness, tingling, tremors, sensory change, speech change, focal weakness, seizures, loss of consciousness, weakness and headaches. Endo/Heme/Allergies:  Negative for environmental allergies and polydipsia. Does not bruise/bleed easily. Results for orders placed or performed in visit on 06/15/22   HEMOGLOBIN A1C WITH EAG   Result Value Ref Range    Hemoglobin A1c 6.2 (H) 4.0 - 5.6 %    Est. average glucose 131 mg/dL   VITAMIN B12   Result Value Ref Range    Vitamin B12 414 193 - 986 pg/mL           Physical Exam  Visit Vitals  /72   Pulse 76   Temp 97.8 °F (36.6 °C) (Oral)   Resp 16   Ht 5' 6\" (1.676 m)   Wt 223 lb (101.2 kg)   SpO2 99%   BMI 35.99 kg/m²         Head: Normocephalic, without obvious abnormality, atraumatic  Eyes: conjunctivae/corneas clear. PERRL, EOM's intact. Neck: supple, symmetrical, trachea midline, no adenopathy, thyroid: not enlarged, symmetric, no tenderness/mass/nodules, no carotid bruit and no JVD  Lungs: clear to auscultation bilaterally  Heart: regular rate and rhythm, S1, S2 normal, no murmur, click, rub or gallop  Extremities: extremities normal, atraumatic, no cyanosis or edema  Pulses: 2+ and symmetric  Lymph nodes: Cervical, supraclavicular, and axillary nodes normal.  Neurologic: Grossly normal      ASSESSMENT and PLAN    ICD-10-CM ICD-9-CM    1.  Hyperlipidemia LDL goal <100  E78.5 272.4 LIPID PANEL METABOLIC PANEL, COMPREHENSIVE      METABOLIC PANEL, COMPREHENSIVE      LIPID PANEL      2. Essential hypertension  C83 606.1 METABOLIC PANEL, COMPREHENSIVE      METABOLIC PANEL, COMPREHENSIVE      3. Diabetes mellitus without complication (HCC)  Z40.2 250.00 HEMOGLOBIN A1C WITH EAG      HEMOGLOBIN A1C WITH EAG      4. Seasonal allergic rhinitis due to pollen  J30.1 477.0       5. Bullous pemphigoid  L12.0 694.5     per dermatology- 2022- on Dupixent      6. Gastroesophageal reflux disease without esophagitis  K21.9 530.81       7. Obesity (BMI 30-39. 9)  E66.9 278.00         Diagnoses and all orders for this visit:    1. Hyperlipidemia LDL goal <100  -     LIPID PANEL; Future  -     METABOLIC PANEL, COMPREHENSIVE; Future    2. Essential hypertension  -     METABOLIC PANEL, COMPREHENSIVE; Future    3. Diabetes mellitus without complication ([address]  -     HEMOGLOBIN A1C WITH EAG; Future    4. Seasonal allergic rhinitis due to pollen    5. Bullous pemphigoid  Comments:  per dermatology- 2022- on Dupixent    6. Gastroesophageal reflux disease without esophagitis    7. Obesity (BMI 30-39. 9)    Follow-up and Dispositions    Return in about 6 months (around 3/30/2023) for F/U HTN,CHOL,DM, GERD, F/U weight concerns. lab results and schedule of future lab studies reviewed with patient  reviewed diet, exercise and weight control  cardiovascular risk and specific lipid/LDL goals reviewed  reviewed medications and side effects in detail  Please call my office if there are any questions- [phone]. I will arrange for follow up after the lab tests done from today return  Recommended a weekly \"heart check. \" I went into detail how to do this. Call for refills on medications as needed. Discussed expected course/resolution/complications of diagnosis in detail with patient. Medication risks/benefits/costs/interactions/alternatives discussed with patient.    Pt was given an after visit summary which includes diagnoses, current medications & vitals. Pt expressed understanding with the diagnosis and plan    BMI is significantly elevated- in the obese range. I reviewed diet, exercise and weight control. Discussed weight control in detail, the importance of mainly decreased carbs, and for weight maintenance, exercise; discussed different diets and that it isn't as important to watch the type of foods as it is to decrease calorie intake no matter what type of diet you do, etc.     Total 50 minutes  re: Discussed specific diabetic recommendations: low cholesterol diet, weight control and daily exercise discussed, home glucose monitoring emphasized, all medications, side effects and compliance discussed carefully, foot care discussed and Podiatry visits discussed, annual eye examinations at Ophthalmology discussed, glycohemoglobin and other lab monitoring discussed and long term diabetic complications discussed. Reviewed BP, cholesterol and diabetic goals. LDL goal<100,HDL goal>45, Triglyceride goal<150, A1C< 7.0, BP<140/90. Review of  the proper technique of checking the blood pressure- check it on an average day only, not on a stressful day, sitting, no exercise for at least 1 hour and not experiencing any new pain( chronic pain is OK). Patient encouraged to check BP sitting and standing at least once a month and to report these readings to me if > 140/ 90 on average , or if the standing BP is >  15 points lower than the sitting. Always check it twice and if there is > 5 points decrease from the previous reading( top reading or systolic) keep checking it until it does not drop 5 points. Write only this final reading down, not the preceding readings. If out of these readings there is only 1 out of 4 or less > 490, or > 90 diastolic then your blood pressure is OK; it needs further treatment if it is above this.     Also, don't forget,  as noted above, to check your blood pressure standing once a month; this is to detect a drop in your BP that might lead to fainting and serious injury; you check it standing with your arm hanging straight down and relaxed. Check it twice waiting 1 minute between the two readings. If, with either one of these 2 readings there is a > 15 point drop of the systolic compared to your sitting pressure( done before the standing BP), then let me know. Following these guidelines, continue to check your BP and write down only the ones described above and it will help me to effectively treat your blood pressure. Reviewed symptoms, or lack thereof, of hypertension and elevated cholesterol. Regular exercise is very important to your health; it helps mentally, physically, socially; it prevents injuries if done properly. Exercise, even as simple as walking 20-30 minutes daily has major benefits to your health even though your \"numbers\" are the same in the lab. See if you can add this into your daily regimen and after a few months it will become a regular habit-\"just something you do,\" like brushing your teeth. A combination of aerobic exercise and strengthening and stretching is felt to be the best for you, so this should be your ultimate goal.   This can be done in the privacy of your home or in a group setting as at the gym  Some prefer having a , others prefer to do exercise in groups or individually. Do what \"works\" for you. You need to make it simple and \"fun,\" or you most likely will not continue it. Recommended a weekly \"heart check. \" I went into detail how to do this. Also, discussed symptoms of concern that were noted today in the note above, treatment options( including doing nothing), when to follow up before recommended time frame. Also, answered all questions. She lost way down to 206 lbs a couple of years ago and has gradually gained that weight back ;she was 225 lbs prior to her weight loss, so we spent significant time discussing ways to work on the weight.  I reassured her that her potassium and kidney function  are \"okay\". She did have one reading a year ago that showed mild GFR decrease but it was normal after that and her potassium has always been normal expect for that last reading that was borderline. This document was written by Kesha Lin, as dictated by Reynaldo Mendiola MD.   I have reviewed and agree with the above note and have made corrections where appropriate Jagdeep Sorto M.D.

## 2022-09-30 NOTE — PROGRESS NOTES
Chief Complaint   Patient presents with    Cholesterol Problem    Diabetes    Hypertension    Other     Bullous pemphigoid per derm    1. \"Have you been to the ER, urgent care clinic since your last visit? Hospitalized since your last visit? \" No    2. \"Have you seen or consulted any other health care providers outside of the [address] [place] [address] since your last visit? \" Yes Dr Fawad Alonzo      3. For patients over 45: Has the patient had a colonoscopy? Yes - no Care Gap present     If the patient is female:    4. For patients over 40: Has the patient had a mammogram? Yes - no Care Gap present    5. For patients over 21: Has the patient had a pap smear?  Yes - no Care Gap present

## 2022-10-01 VITALS
RESPIRATION RATE: 16 BRPM | SYSTOLIC BLOOD PRESSURE: 135 MMHG | HEART RATE: 76 BPM | WEIGHT: 223 LBS | BODY MASS INDEX: 35.84 KG/M2 | HEIGHT: 66 IN | OXYGEN SATURATION: 99 % | DIASTOLIC BLOOD PRESSURE: 72 MMHG | TEMPERATURE: 97.8 F

## 2022-10-01 LAB
ALBUMIN SERPL-MCNC: 3.6 G/DL (ref 3.5–5)
ALBUMIN/GLOB SERPL: 1.2 {RATIO} (ref 1.1–2.2)
ALP SERPL-CCNC: 112 U/L (ref 45–117)
ALT SERPL-CCNC: 33 U/L (ref 12–78)
ANION GAP SERPL CALC-SCNC: 2 MMOL/L (ref 5–15)
AST SERPL-CCNC: 25 U/L (ref 15–37)
BILIRUB SERPL-MCNC: 0.8 MG/DL (ref 0.2–1)
BUN SERPL-MCNC: 17 MG/DL (ref 6–20)
BUN/CREAT SERPL: 18 (ref 12–20)
CALCIUM SERPL-MCNC: 9.5 MG/DL (ref 8.5–10.1)
CHLORIDE SERPL-SCNC: 108 MMOL/L (ref 97–108)
CHOLEST SERPL-MCNC: 180 MG/DL
CO2 SERPL-SCNC: 31 MMOL/L (ref 21–32)
CREAT SERPL-MCNC: 0.96 MG/DL (ref 0.55–1.02)
EST. AVERAGE GLUCOSE BLD GHB EST-MCNC: 137 MG/DL
GLOBULIN SER CALC-MCNC: 2.9 G/DL (ref 2–4)
GLUCOSE SERPL-MCNC: 151 MG/DL (ref 65–100)
HBA1C MFR BLD: 6.4 % (ref 4–5.6)
HDLC SERPL-MCNC: 80 MG/DL
HDLC SERPL: 2.3 {RATIO} (ref 0–5)
LDLC SERPL CALC-MCNC: 77.6 MG/DL (ref 0–100)
POTASSIUM SERPL-SCNC: 4.7 MMOL/L (ref 3.5–5.1)
PROT SERPL-MCNC: 6.5 G/DL (ref 6.4–8.2)
SODIUM SERPL-SCNC: 141 MMOL/L (ref 136–145)
TRIGL SERPL-MCNC: 112 MG/DL (ref ?–150)
VLDLC SERPL CALC-MCNC: 22.4 MG/DL

## 2023-01-16 ENCOUNTER — PATIENT MESSAGE (OUTPATIENT)
Dept: FAMILY MEDICINE CLINIC | Age: 73
End: 2023-01-16

## 2023-01-17 RX ORDER — SCOLOPAMINE TRANSDERMAL SYSTEM 1 MG/1
1 PATCH, EXTENDED RELEASE TRANSDERMAL
Qty: 4 PATCH | Refills: 0 | Status: SHIPPED | OUTPATIENT
Start: 2023-01-17

## 2023-01-17 NOTE — TELEPHONE ENCOUNTER
From: Fred Obregon  To: Lavinia Bosworth, MD  Sent: 1/16/2023 6:48 PM EST  Subject: Prescription request    Hello. I need a prescription for Scopolamine   Generic for Transderm patch 1mg/3days    Requesting 8 patches    I have used them for years for motion sickness on airplanes and boats. They are usually good for at least 2 years or more.      The best price seems to be at [address] [facility]  [phone]    Thank you,  Corrinne Crow  1950

## 2023-05-08 ENCOUNTER — OFFICE VISIT (OUTPATIENT)
Age: 73
End: 2023-05-08
Payer: MEDICARE

## 2023-05-08 VITALS
BODY MASS INDEX: 35.2 KG/M2 | DIASTOLIC BLOOD PRESSURE: 72 MMHG | WEIGHT: 219 LBS | HEART RATE: 78 BPM | SYSTOLIC BLOOD PRESSURE: 126 MMHG | OXYGEN SATURATION: 98 % | HEIGHT: 66 IN | RESPIRATION RATE: 16 BRPM | TEMPERATURE: 97.2 F

## 2023-05-08 DIAGNOSIS — E08.22 DIABETES MELLITUS DUE TO UNDERLYING CONDITION WITH STAGE 3 CHRONIC KIDNEY DISEASE, WITHOUT LONG-TERM CURRENT USE OF INSULIN, UNSPECIFIED WHETHER STAGE 3A OR 3B CKD (HCC): Primary | ICD-10-CM

## 2023-05-08 DIAGNOSIS — E78.5 HYPERLIPIDEMIA LDL GOAL <100: ICD-10-CM

## 2023-05-08 DIAGNOSIS — K21.9 GASTROESOPHAGEAL REFLUX DISEASE WITHOUT ESOPHAGITIS: ICD-10-CM

## 2023-05-08 DIAGNOSIS — N18.31 CHRONIC KIDNEY DISEASE, STAGE 3A (HCC): ICD-10-CM

## 2023-05-08 DIAGNOSIS — Z11.59 NEED FOR HEPATITIS C SCREENING TEST: ICD-10-CM

## 2023-05-08 DIAGNOSIS — H81.319 AUDITORY VERTIGO, UNSPECIFIED LATERALITY: ICD-10-CM

## 2023-05-08 DIAGNOSIS — E66.01 SEVERE OBESITY (BMI 35.0-39.9) WITH COMORBIDITY (HCC): ICD-10-CM

## 2023-05-08 DIAGNOSIS — N18.30 DIABETES MELLITUS DUE TO UNDERLYING CONDITION WITH STAGE 3 CHRONIC KIDNEY DISEASE, WITHOUT LONG-TERM CURRENT USE OF INSULIN, UNSPECIFIED WHETHER STAGE 3A OR 3B CKD (HCC): Primary | ICD-10-CM

## 2023-05-08 DIAGNOSIS — I10 PRIMARY HYPERTENSION: ICD-10-CM

## 2023-05-08 DIAGNOSIS — J30.1 SEASONAL ALLERGIC RHINITIS DUE TO POLLEN: ICD-10-CM

## 2023-05-08 LAB
ALBUMIN SERPL-MCNC: 3.7 G/DL (ref 3.5–5)
ALBUMIN/GLOB SERPL: 1.3 (ref 1.1–2.2)
ALP SERPL-CCNC: 101 U/L (ref 45–117)
ALT SERPL-CCNC: 45 U/L (ref 12–78)
ANION GAP SERPL CALC-SCNC: 4 MMOL/L (ref 5–15)
AST SERPL-CCNC: 33 U/L (ref 15–37)
BILIRUB SERPL-MCNC: 0.9 MG/DL (ref 0.2–1)
BUN SERPL-MCNC: 10 MG/DL (ref 6–20)
BUN/CREAT SERPL: 12 (ref 12–20)
CALCIUM SERPL-MCNC: 9.4 MG/DL (ref 8.5–10.1)
CHLORIDE SERPL-SCNC: 106 MMOL/L (ref 97–108)
CHOLEST SERPL-MCNC: 185 MG/DL
CO2 SERPL-SCNC: 29 MMOL/L (ref 21–32)
CREAT SERPL-MCNC: 0.84 MG/DL (ref 0.55–1.02)
CREAT UR-MCNC: 74.6 MG/DL
EST. AVERAGE GLUCOSE BLD GHB EST-MCNC: 146 MG/DL
GLOBULIN SER CALC-MCNC: 2.8 G/DL (ref 2–4)
GLUCOSE SERPL-MCNC: 159 MG/DL (ref 65–100)
HBA1C MFR BLD: 6.7 % (ref 4–5.6)
HDLC SERPL-MCNC: 82 MG/DL
HDLC SERPL: 2.3 (ref 0–5)
LDLC SERPL CALC-MCNC: 81.6 MG/DL (ref 0–100)
MICROALBUMIN UR-MCNC: 1.19 MG/DL
MICROALBUMIN/CREAT UR-RTO: 16 MG/G (ref 0–30)
POTASSIUM SERPL-SCNC: 4.7 MMOL/L (ref 3.5–5.1)
PROT SERPL-MCNC: 6.5 G/DL (ref 6.4–8.2)
SODIUM SERPL-SCNC: 139 MMOL/L (ref 136–145)
TRIGL SERPL-MCNC: 107 MG/DL
VLDLC SERPL CALC-MCNC: 21.4 MG/DL

## 2023-05-08 PROCEDURE — 99215 OFFICE O/P EST HI 40 MIN: CPT | Performed by: FAMILY MEDICINE

## 2023-05-08 RX ORDER — CHLORAL HYDRATE 500 MG
CAPSULE ORAL DAILY
COMMUNITY

## 2023-05-08 RX ORDER — SCOLOPAMINE TRANSDERMAL SYSTEM 1 MG/1
1 PATCH, EXTENDED RELEASE TRANSDERMAL
Qty: 12 PATCH | Refills: 0 | Status: SHIPPED | OUTPATIENT
Start: 2023-05-08

## 2023-05-08 RX ORDER — FAMOTIDINE 20 MG/1
20 TABLET, FILM COATED ORAL 2 TIMES DAILY
COMMUNITY

## 2023-05-08 SDOH — ECONOMIC STABILITY: FOOD INSECURITY: WITHIN THE PAST 12 MONTHS, THE FOOD YOU BOUGHT JUST DIDN'T LAST AND YOU DIDN'T HAVE MONEY TO GET MORE.: NEVER TRUE

## 2023-05-08 SDOH — ECONOMIC STABILITY: FOOD INSECURITY: WITHIN THE PAST 12 MONTHS, YOU WORRIED THAT YOUR FOOD WOULD RUN OUT BEFORE YOU GOT MONEY TO BUY MORE.: NEVER TRUE

## 2023-05-08 SDOH — ECONOMIC STABILITY: INCOME INSECURITY: HOW HARD IS IT FOR YOU TO PAY FOR THE VERY BASICS LIKE FOOD, HOUSING, MEDICAL CARE, AND HEATING?: NOT HARD AT ALL

## 2023-05-08 SDOH — ECONOMIC STABILITY: HOUSING INSECURITY
IN THE LAST 12 MONTHS, WAS THERE A TIME WHEN YOU DID NOT HAVE A STEADY PLACE TO SLEEP OR SLEPT IN A SHELTER (INCLUDING NOW)?: NO

## 2023-05-08 ASSESSMENT — ENCOUNTER SYMPTOMS
SHORTNESS OF BREATH: 0
EYE PAIN: 0
COUGH: 0
EYE REDNESS: 0
WHEEZING: 0

## 2023-05-08 ASSESSMENT — PATIENT HEALTH QUESTIONNAIRE - PHQ9
SUM OF ALL RESPONSES TO PHQ QUESTIONS 1-9: 0
SUM OF ALL RESPONSES TO PHQ QUESTIONS 1-9: 0
2. FEELING DOWN, DEPRESSED OR HOPELESS: 0
SUM OF ALL RESPONSES TO PHQ QUESTIONS 1-9: 0
SUM OF ALL RESPONSES TO PHQ QUESTIONS 1-9: 0
SUM OF ALL RESPONSES TO PHQ9 QUESTIONS 1 & 2: 0
1. LITTLE INTEREST OR PLEASURE IN DOING THINGS: 0

## 2023-05-08 NOTE — PROGRESS NOTES
Chief Complaint   Patient presents with    Cholesterol Problem    Hypertension    1. \"Have you been to the ER, urgent care clinic since your last visit? Hospitalized since your last visit? \" Urgent care bronchitis    2. \"Have you seen or consulted any other health care providers outside of the [address] [place] [address] since your last visit? \" no    3. For patients over 45: Has the patient had a colonoscopy? Yes     If the patient is female:    4. For patients over 40: Has the patient had a mammogram? Yes    5. For patients over 21: Has the patient had a pap smear?  N/A
it.     Recommended a weekly \"heart check. \" I went into detail how to do this. Also, discussed symptoms of concern that were noted today in the note above, treatment options( including doing nothing), when to follow up before recommended time frame. Also, answered all questions. Informed pt that the glucose has been borderline and the A1C as well but neither one is high enough to officially diagnose DM. Unless things change, that will definitely show up. We talked briefly about using GLP-1 medications but that is not covered unless she has an official DM diagnosis. She checks her BP at home, and on average, it is 132/78. This document was written by Linda Nicholson, as dictated by Joan Tapia MD.    I have reviewed and agree with the above note and have made corrections where appropriate Dean Herrera M.D.

## 2023-05-09 LAB — HCV AB SERPL QL IA: NONREACTIVE

## 2023-05-10 ENCOUNTER — TELEPHONE (OUTPATIENT)
Age: 73
End: 2023-05-10

## 2023-05-10 RX ORDER — METFORMIN HYDROCHLORIDE 500 MG/1
1000 TABLET, EXTENDED RELEASE ORAL
Qty: 60 TABLET | Refills: 5 | Status: CANCELLED | OUTPATIENT
Start: 2023-05-10

## 2023-05-10 RX ORDER — METFORMIN HYDROCHLORIDE 500 MG/1
1000 TABLET, EXTENDED RELEASE ORAL
Qty: 180 TABLET | Refills: 1 | Status: SHIPPED | OUTPATIENT
Start: 2023-05-10

## 2023-05-10 NOTE — TELEPHONE ENCOUNTER
----- Message from Tereza Chi MD sent at 5/10/2023  2:13 AM EDT -----  Everything is OK except the A1C is now > 6.5( consistent with diagnosis of diabetes). Normal liver and kidney tests. Great cholesterol numbers. Let's start Metformin  mg daily with a meal and increase to 2 a day after 1 month if no stomach upset. Repeat A1C  at office visit in 3 months( need to schedule).  We can start Wegovy/Ozempic after you are on Metformin

## 2023-05-12 PROBLEM — E11.22 TYPE 2 DIABETES MELLITUS WITH CHRONIC KIDNEY DISEASE (HCC): Status: ACTIVE | Noted: 2023-05-12

## 2023-07-03 RX ORDER — ATENOLOL 50 MG/1
TABLET ORAL
Qty: 90 TABLET | Refills: 1 | Status: SHIPPED | OUTPATIENT
Start: 2023-07-03

## 2023-07-17 RX ORDER — PRAVASTATIN SODIUM 40 MG
TABLET ORAL
Qty: 90 TABLET | Refills: 3 | Status: SHIPPED | OUTPATIENT
Start: 2023-07-17

## 2023-08-01 ENCOUNTER — OFFICE VISIT (OUTPATIENT)
Age: 73
End: 2023-08-01
Payer: MEDICARE

## 2023-08-01 VITALS
DIASTOLIC BLOOD PRESSURE: 70 MMHG | HEART RATE: 76 BPM | TEMPERATURE: 98.1 F | HEIGHT: 66 IN | SYSTOLIC BLOOD PRESSURE: 122 MMHG | OXYGEN SATURATION: 99 % | RESPIRATION RATE: 17 BRPM | WEIGHT: 206 LBS | BODY MASS INDEX: 33.11 KG/M2

## 2023-08-01 DIAGNOSIS — I10 ESSENTIAL (PRIMARY) HYPERTENSION: ICD-10-CM

## 2023-08-01 DIAGNOSIS — E11.9 DIABETES MELLITUS WITHOUT COMPLICATION (HCC): Primary | ICD-10-CM

## 2023-08-01 DIAGNOSIS — Z78.9 LDL-C GREATER THAN OR EQUAL TO 100 MG/DL: ICD-10-CM

## 2023-08-01 DIAGNOSIS — K21.9 GASTROESOPHAGEAL REFLUX DISEASE WITHOUT ESOPHAGITIS: ICD-10-CM

## 2023-08-01 DIAGNOSIS — M89.8X1 SHOULDER BLADE PAIN: ICD-10-CM

## 2023-08-01 PROCEDURE — 3074F SYST BP LT 130 MM HG: CPT | Performed by: FAMILY MEDICINE

## 2023-08-01 PROCEDURE — G8400 PT W/DXA NO RESULTS DOC: HCPCS | Performed by: FAMILY MEDICINE

## 2023-08-01 PROCEDURE — 3017F COLORECTAL CA SCREEN DOC REV: CPT | Performed by: FAMILY MEDICINE

## 2023-08-01 PROCEDURE — 1090F PRES/ABSN URINE INCON ASSESS: CPT | Performed by: FAMILY MEDICINE

## 2023-08-01 PROCEDURE — 99214 OFFICE O/P EST MOD 30 MIN: CPT | Performed by: FAMILY MEDICINE

## 2023-08-01 PROCEDURE — 3044F HG A1C LEVEL LT 7.0%: CPT | Performed by: FAMILY MEDICINE

## 2023-08-01 PROCEDURE — 2022F DILAT RTA XM EVC RTNOPTHY: CPT | Performed by: FAMILY MEDICINE

## 2023-08-01 PROCEDURE — 1123F ACP DISCUSS/DSCN MKR DOCD: CPT | Performed by: FAMILY MEDICINE

## 2023-08-01 PROCEDURE — 3078F DIAST BP <80 MM HG: CPT | Performed by: FAMILY MEDICINE

## 2023-08-01 PROCEDURE — G8417 CALC BMI ABV UP PARAM F/U: HCPCS | Performed by: FAMILY MEDICINE

## 2023-08-01 PROCEDURE — 1036F TOBACCO NON-USER: CPT | Performed by: FAMILY MEDICINE

## 2023-08-01 PROCEDURE — G8427 DOCREV CUR MEDS BY ELIG CLIN: HCPCS | Performed by: FAMILY MEDICINE

## 2023-08-01 ASSESSMENT — PATIENT HEALTH QUESTIONNAIRE - PHQ9
1. LITTLE INTEREST OR PLEASURE IN DOING THINGS: 0
SUM OF ALL RESPONSES TO PHQ QUESTIONS 1-9: 0
SUM OF ALL RESPONSES TO PHQ9 QUESTIONS 1 & 2: 0
SUM OF ALL RESPONSES TO PHQ QUESTIONS 1-9: 0
2. FEELING DOWN, DEPRESSED OR HOPELESS: 0

## 2023-08-01 ASSESSMENT — ENCOUNTER SYMPTOMS
EYE REDNESS: 0
WHEEZING: 0
COUGH: 0
SHORTNESS OF BREATH: 0
EYE PAIN: 0

## 2023-08-01 NOTE — PROGRESS NOTES
Subjective     HPI    Deny Espinal is a 67 y.o. female with a history of HTN, GERD, bullous pemphigoid, resolved DM( due to previous weight loss), calculus of kidney, dyspareunia, stage III CKD, motion sickness, age-related cataracts and hyperlipidemia with LDL goal <100, who presents to the office today for follow up of these health problems. Pt notes that she lost weight after taking her DM-II medication metformin, which suppressed her appetite. She states that she is also on a low-carb diet. She notes that she does not check her sugars at home regularly. ; we are checking it here in the office    She is having intermittent pain in the area just medial to the lower aspect of her R shoulder blade, which sometimes last for a few seconds or a few hrs, but does not last all day. She has also noticed swelling 4-5 inches below her R knee joint, and involves the entire R proximal knee joint area. Pt denies unusual SOB, chest pain, and any recent ER visits or hospitalizations.      Past Medical History:   Diagnosis Date    Adverse effect of anesthesia     treats pt for motion sickness with a patch    AR (allergic rhinitis)     Bullous pemphigoid 09/2022    Calculus of kidney     Dyspareunia     GERD (gastroesophageal reflux disease) 11/30/2010    HBP (high blood pressure) 08/09/2010    History of motion sickness 03/29/2014    Hyperlipidemia LDL goal <100 07/11/2018    Incomplete rectal prolapse 08/06/2015    Obesity (BMI 30-39.9) 06/30/2013     Past Surgical History:   Procedure Laterality Date    CATARACT REMOVAL Right 08/2018    left 09/18    CHOLECYSTECTOMY  1987    COLONOSCOPY N/A 10/4/2017    COLONOSCOPY performed by Milton [facility] MD at [facility] ENDOSCOPY    COLONOSCOPY  2007    repeat in 2017    HEENT Right     macular hole repair on right eye    KNEE ARTHROSCOPY Left 02/15/2017    meniscus     Current Outpatient Medications on File Prior to Visit   Medication Sig Dispense Refill    pravastatin (PRAVACHOL) 40

## 2023-08-01 NOTE — PROGRESS NOTES
Chief Complaint   Patient presents with    Diabetes    1. \"Have you been to the ER, urgent care clinic since your last visit? Hospitalized since your last visit? \" no  2. \"Have you seen or consulted any other health care providers outside of the [address] [place] [address] since your last visit? \" yes - Gyn breast biopsy  3. For patients over 45: Has the patient had a colonoscopy? Yes     If the patient is female:    4. For patients over 40: Has the patient had a mammogram? Yes    5. For patients over 21: Has the patient had a pap smear?  Yes

## 2023-08-02 LAB
ANION GAP SERPL CALC-SCNC: 6 MMOL/L (ref 5–15)
BUN SERPL-MCNC: 14 MG/DL (ref 6–20)
BUN/CREAT SERPL: 14 (ref 12–20)
CALCIUM SERPL-MCNC: 9.3 MG/DL (ref 8.5–10.1)
CHLORIDE SERPL-SCNC: 108 MMOL/L (ref 97–108)
CO2 SERPL-SCNC: 26 MMOL/L (ref 21–32)
CREAT SERPL-MCNC: 1 MG/DL (ref 0.55–1.02)
EST. AVERAGE GLUCOSE BLD GHB EST-MCNC: 117 MG/DL
GLUCOSE SERPL-MCNC: 129 MG/DL (ref 65–100)
HBA1C MFR BLD: 5.7 % (ref 4–5.6)
POTASSIUM SERPL-SCNC: 4.3 MMOL/L (ref 3.5–5.1)
SODIUM SERPL-SCNC: 140 MMOL/L (ref 136–145)

## 2023-09-17 ENCOUNTER — PATIENT MESSAGE (OUTPATIENT)
Age: 73
End: 2023-09-17

## 2023-09-18 NOTE — TELEPHONE ENCOUNTER
From: Rocio Betts  To: Dr. Kapil Hargrove: 2023 6:01 PM EDT  Subject: Question concerning vaccines    Should I get the RSV shot? Do you recommend getting the newest COVID vaccine? I will also need the flu vaccine, usually early October. As a sidebar I just had a lumpectomy last week . And will be having radiation most likely in October. [facility] will be sending your office my medical updates.      Thank you,   Pravin Mann 1950

## 2023-10-02 RX ORDER — METFORMIN HYDROCHLORIDE 500 MG/1
TABLET, EXTENDED RELEASE ORAL
Qty: 180 TABLET | Refills: 3 | Status: SHIPPED | OUTPATIENT
Start: 2023-10-02 | End: 2023-10-04

## 2023-10-04 ENCOUNTER — OFFICE VISIT (OUTPATIENT)
Age: 73
End: 2023-10-04
Payer: MEDICARE

## 2023-10-04 VITALS
OXYGEN SATURATION: 98 % | TEMPERATURE: 98 F | DIASTOLIC BLOOD PRESSURE: 62 MMHG | SYSTOLIC BLOOD PRESSURE: 128 MMHG | WEIGHT: 199 LBS | BODY MASS INDEX: 31.98 KG/M2 | RESPIRATION RATE: 16 BRPM | HEART RATE: 60 BPM | HEIGHT: 66 IN

## 2023-10-04 DIAGNOSIS — I10 ESSENTIAL (PRIMARY) HYPERTENSION: ICD-10-CM

## 2023-10-04 DIAGNOSIS — E66.09 CLASS 1 OBESITY DUE TO EXCESS CALORIES WITH SERIOUS COMORBIDITY AND BODY MASS INDEX (BMI) OF 32.0 TO 32.9 IN ADULT: ICD-10-CM

## 2023-10-04 DIAGNOSIS — K21.9 GASTROESOPHAGEAL REFLUX DISEASE WITHOUT ESOPHAGITIS: ICD-10-CM

## 2023-10-04 DIAGNOSIS — E78.5 HYPERLIPIDEMIA LDL GOAL <100: ICD-10-CM

## 2023-10-04 DIAGNOSIS — E11.9 DIABETES MELLITUS WITHOUT COMPLICATION (HCC): Primary | ICD-10-CM

## 2023-10-04 LAB
ALBUMIN SERPL-MCNC: 3.7 G/DL (ref 3.5–5)
ALBUMIN/GLOB SERPL: 1.4 (ref 1.1–2.2)
ALP SERPL-CCNC: 87 U/L (ref 45–117)
ALT SERPL-CCNC: 30 U/L (ref 12–78)
ANION GAP SERPL CALC-SCNC: 5 MMOL/L (ref 5–15)
AST SERPL-CCNC: 23 U/L (ref 15–37)
BILIRUB SERPL-MCNC: 0.8 MG/DL (ref 0.2–1)
BUN SERPL-MCNC: 11 MG/DL (ref 6–20)
BUN/CREAT SERPL: 14 (ref 12–20)
CALCIUM SERPL-MCNC: 9.3 MG/DL (ref 8.5–10.1)
CHLORIDE SERPL-SCNC: 106 MMOL/L (ref 97–108)
CHOLEST SERPL-MCNC: 173 MG/DL
CO2 SERPL-SCNC: 28 MMOL/L (ref 21–32)
CREAT SERPL-MCNC: 0.81 MG/DL (ref 0.55–1.02)
EST. AVERAGE GLUCOSE BLD GHB EST-MCNC: 108 MG/DL
GLOBULIN SER CALC-MCNC: 2.7 G/DL (ref 2–4)
GLUCOSE SERPL-MCNC: 125 MG/DL (ref 65–100)
HBA1C MFR BLD: 5.4 % (ref 4–5.6)
HDLC SERPL-MCNC: 76 MG/DL
HDLC SERPL: 2.3 (ref 0–5)
LDLC SERPL CALC-MCNC: 69.2 MG/DL (ref 0–100)
POTASSIUM SERPL-SCNC: 5.1 MMOL/L (ref 3.5–5.1)
PROT SERPL-MCNC: 6.4 G/DL (ref 6.4–8.2)
SODIUM SERPL-SCNC: 139 MMOL/L (ref 136–145)
TRIGL SERPL-MCNC: 139 MG/DL
VLDLC SERPL CALC-MCNC: 27.8 MG/DL

## 2023-10-04 PROCEDURE — 3044F HG A1C LEVEL LT 7.0%: CPT | Performed by: FAMILY MEDICINE

## 2023-10-04 PROCEDURE — 1123F ACP DISCUSS/DSCN MKR DOCD: CPT | Performed by: FAMILY MEDICINE

## 2023-10-04 PROCEDURE — 3078F DIAST BP <80 MM HG: CPT | Performed by: FAMILY MEDICINE

## 2023-10-04 PROCEDURE — 99215 OFFICE O/P EST HI 40 MIN: CPT | Performed by: FAMILY MEDICINE

## 2023-10-04 PROCEDURE — 1090F PRES/ABSN URINE INCON ASSESS: CPT | Performed by: FAMILY MEDICINE

## 2023-10-04 PROCEDURE — 3074F SYST BP LT 130 MM HG: CPT | Performed by: FAMILY MEDICINE

## 2023-10-04 PROCEDURE — G8427 DOCREV CUR MEDS BY ELIG CLIN: HCPCS | Performed by: FAMILY MEDICINE

## 2023-10-04 PROCEDURE — G8417 CALC BMI ABV UP PARAM F/U: HCPCS | Performed by: FAMILY MEDICINE

## 2023-10-04 PROCEDURE — 3017F COLORECTAL CA SCREEN DOC REV: CPT | Performed by: FAMILY MEDICINE

## 2023-10-04 PROCEDURE — 2022F DILAT RTA XM EVC RTNOPTHY: CPT | Performed by: FAMILY MEDICINE

## 2023-10-04 PROCEDURE — G8400 PT W/DXA NO RESULTS DOC: HCPCS | Performed by: FAMILY MEDICINE

## 2023-10-04 PROCEDURE — G8484 FLU IMMUNIZE NO ADMIN: HCPCS | Performed by: FAMILY MEDICINE

## 2023-10-04 PROCEDURE — 1036F TOBACCO NON-USER: CPT | Performed by: FAMILY MEDICINE

## 2023-10-04 RX ORDER — METFORMIN HYDROCHLORIDE 500 MG/1
500 TABLET, EXTENDED RELEASE ORAL
Qty: 90 TABLET | Refills: 0
Start: 2023-10-04

## 2023-10-04 ASSESSMENT — PATIENT HEALTH QUESTIONNAIRE - PHQ9
SUM OF ALL RESPONSES TO PHQ9 QUESTIONS 1 & 2: 0
1. LITTLE INTEREST OR PLEASURE IN DOING THINGS: 0
SUM OF ALL RESPONSES TO PHQ QUESTIONS 1-9: 0
SUM OF ALL RESPONSES TO PHQ QUESTIONS 1-9: 0
2. FEELING DOWN, DEPRESSED OR HOPELESS: 0
SUM OF ALL RESPONSES TO PHQ QUESTIONS 1-9: 0
SUM OF ALL RESPONSES TO PHQ QUESTIONS 1-9: 0

## 2023-10-04 ASSESSMENT — ENCOUNTER SYMPTOMS
WHEEZING: 0
SHORTNESS OF BREATH: 0
EYE REDNESS: 0
COUGH: 0
EYE PAIN: 0

## 2023-12-06 RX ORDER — ATENOLOL 50 MG/1
TABLET ORAL
Qty: 90 TABLET | Refills: 1 | Status: SHIPPED | OUTPATIENT
Start: 2023-12-06

## 2023-12-06 NOTE — TELEPHONE ENCOUNTER
PCP: Dean Soto MD      Future Appointments   Date Time Provider Department Center   3/5/2024 11:00 AM Dean Soto MD [facility]       Requested Prescriptions     Pending Prescriptions Disp Refills    atenolol (TENORMIN) 50 MG tablet [Pharmacy Med Name: Atenolol 50 MG Oral Tablet] 90 tablet 3     Sig: TAKE 1 TABLET BY MOUTH DAILY

## 2024-02-21 DIAGNOSIS — H81.319 AUDITORY VERTIGO, UNSPECIFIED LATERALITY: ICD-10-CM

## 2024-02-22 RX ORDER — SCOLOPAMINE TRANSDERMAL SYSTEM 1 MG/1
PATCH, EXTENDED RELEASE TRANSDERMAL
Qty: 12 PATCH | Refills: 0 | Status: SHIPPED | OUTPATIENT
Start: 2024-02-22

## 2024-03-05 ENCOUNTER — OFFICE VISIT (OUTPATIENT)
Age: 74
End: 2024-03-05
Payer: MEDICARE

## 2024-03-05 VITALS
DIASTOLIC BLOOD PRESSURE: 74 MMHG | RESPIRATION RATE: 16 BRPM | TEMPERATURE: 97.8 F | WEIGHT: 196 LBS | BODY MASS INDEX: 31.5 KG/M2 | OXYGEN SATURATION: 97 % | SYSTOLIC BLOOD PRESSURE: 132 MMHG | HEART RATE: 78 BPM | HEIGHT: 66 IN

## 2024-03-05 DIAGNOSIS — N18.31 CHRONIC KIDNEY DISEASE, STAGE 3A (HCC): ICD-10-CM

## 2024-03-05 DIAGNOSIS — E78.5 HYPERLIPIDEMIA LDL GOAL <100: ICD-10-CM

## 2024-03-05 DIAGNOSIS — E11.22 TYPE 2 DIABETES MELLITUS WITH CHRONIC KIDNEY DISEASE, WITHOUT LONG-TERM CURRENT USE OF INSULIN, UNSPECIFIED CKD STAGE (HCC): ICD-10-CM

## 2024-03-05 DIAGNOSIS — K21.9 GASTROESOPHAGEAL REFLUX DISEASE WITHOUT ESOPHAGITIS: ICD-10-CM

## 2024-03-05 DIAGNOSIS — I10 PRIMARY HYPERTENSION: Primary | ICD-10-CM

## 2024-03-05 PROCEDURE — G8417 CALC BMI ABV UP PARAM F/U: HCPCS | Performed by: FAMILY MEDICINE

## 2024-03-05 PROCEDURE — 3017F COLORECTAL CA SCREEN DOC REV: CPT | Performed by: FAMILY MEDICINE

## 2024-03-05 PROCEDURE — 3075F SYST BP GE 130 - 139MM HG: CPT | Performed by: FAMILY MEDICINE

## 2024-03-05 PROCEDURE — 99214 OFFICE O/P EST MOD 30 MIN: CPT | Performed by: FAMILY MEDICINE

## 2024-03-05 PROCEDURE — 2022F DILAT RTA XM EVC RTNOPTHY: CPT | Performed by: FAMILY MEDICINE

## 2024-03-05 PROCEDURE — G8484 FLU IMMUNIZE NO ADMIN: HCPCS | Performed by: FAMILY MEDICINE

## 2024-03-05 PROCEDURE — 1090F PRES/ABSN URINE INCON ASSESS: CPT | Performed by: FAMILY MEDICINE

## 2024-03-05 PROCEDURE — 3078F DIAST BP <80 MM HG: CPT | Performed by: FAMILY MEDICINE

## 2024-03-05 PROCEDURE — G8400 PT W/DXA NO RESULTS DOC: HCPCS | Performed by: FAMILY MEDICINE

## 2024-03-05 PROCEDURE — G8427 DOCREV CUR MEDS BY ELIG CLIN: HCPCS | Performed by: FAMILY MEDICINE

## 2024-03-05 PROCEDURE — 1123F ACP DISCUSS/DSCN MKR DOCD: CPT | Performed by: FAMILY MEDICINE

## 2024-03-05 PROCEDURE — 3046F HEMOGLOBIN A1C LEVEL >9.0%: CPT | Performed by: FAMILY MEDICINE

## 2024-03-05 PROCEDURE — 1036F TOBACCO NON-USER: CPT | Performed by: FAMILY MEDICINE

## 2024-03-05 RX ORDER — LETROZOLE 2.5 MG/1
2.5 TABLET, FILM COATED ORAL DAILY
COMMUNITY
Start: 2023-10-05

## 2024-03-05 ASSESSMENT — ENCOUNTER SYMPTOMS
WHEEZING: 0
COUGH: 0
SHORTNESS OF BREATH: 0
EYE REDNESS: 0
EYE PAIN: 0

## 2024-03-05 ASSESSMENT — PATIENT HEALTH QUESTIONNAIRE - PHQ9
SUM OF ALL RESPONSES TO PHQ9 QUESTIONS 1 & 2: 0
1. LITTLE INTEREST OR PLEASURE IN DOING THINGS: 0
SUM OF ALL RESPONSES TO PHQ QUESTIONS 1-9: 0
2. FEELING DOWN, DEPRESSED OR HOPELESS: 0
SUM OF ALL RESPONSES TO PHQ QUESTIONS 1-9: 0

## 2024-03-05 NOTE — PROGRESS NOTES
Subjective     HPI    Kierra Lakhani is a 73 y.o. female who comes in with HTN, GERD, bullous pemphigoid, resolved DM( due to previous weight loss), calculus of kidney, dyspareunia, stage III CKD, motion sickness, age-related cataracts, hyperlipidemia with LDL goal <100, and left breast cancer( 7/23)who presents to the office today for follow up of these health problems.    Pt is mainly following up on her BP, cholesterol, and DM-II.     Her max weight was 224 lbs on her home scale.     Pt denies unusual SOB, chest pain, and any recent ER visits or hospitalizations.     Past Medical History:   Diagnosis Date    Adverse effect of anesthesia     treats pt for motion sickness with a patch    AR (allergic rhinitis)     Breast cancer (HCC) 2023    Bullous pemphigoid 09/2022    Calculus of kidney     Dyspareunia     GERD (gastroesophageal reflux disease) 11/30/2010    HBP (high blood pressure) 08/09/2010    History of left breast cancer     7/2023-  lumpectomy + [facility]    History of motion sickness 03/29/2014    Hyperlipidemia LDL goal <100 07/11/2018    Incomplete rectal prolapse 08/06/2015    Obesity (BMI 30-39.9) 06/30/2013     Past Surgical History:   Procedure Laterality Date    BREAST SURGERY Left 09/2023    lumpectomy    CATARACT REMOVAL Right 08/2018    left 09/18    CHOLECYSTECTOMY  1987    COLONOSCOPY N/A 10/4/2017    COLONOSCOPY performed by Dionisio Rodriguez MD at [facility] ENDOSCOPY    COLONOSCOPY  2007    repeat in 2017    HEENT Right     macular hole repair on right eye    KNEE ARTHROSCOPY Left 02/15/2017    meniscus     Current Outpatient Medications on File Prior to Visit   Medication Sig Dispense Refill    letrozole (FEMARA) 2.5 MG tablet Take 1 tablet by mouth daily      atenolol (TENORMIN) 50 MG tablet TAKE 1 TABLET BY MOUTH DAILY 90 tablet 1    metFORMIN (GLUCOPHAGE-XR) 500 MG extended release tablet Take 1 tablet by mouth daily (with breakfast) 90 tablet 0    pravastatin (PRAVACHOL) 40 MG tablet TAKE 1

## 2024-03-05 NOTE — PROGRESS NOTES
Chief Complaint   Patient presents with    Hypertension    Diabetes    Cholesterol Problem      \"Have you been to the ER, urgent care clinic since your last visit?  Hospitalized since your last visit?\"    NO    “Have you seen or consulted any other health care providers outside of [facility] since your last visit?”    [facility] for breast

## 2024-03-06 LAB
ALBUMIN SERPL-MCNC: 3.8 G/DL (ref 3.5–5)
ALBUMIN/GLOB SERPL: 1.3 (ref 1.1–2.2)
ALP SERPL-CCNC: 105 U/L (ref 45–117)
ALT SERPL-CCNC: 28 U/L (ref 12–78)
ANION GAP SERPL CALC-SCNC: 4 MMOL/L (ref 5–15)
AST SERPL-CCNC: 17 U/L (ref 15–37)
BILIRUB SERPL-MCNC: 0.6 MG/DL (ref 0.2–1)
BUN SERPL-MCNC: 14 MG/DL (ref 6–20)
BUN/CREAT SERPL: 15 (ref 12–20)
CALCIUM SERPL-MCNC: 9.6 MG/DL (ref 8.5–10.1)
CHLORIDE SERPL-SCNC: 106 MMOL/L (ref 97–108)
CHOLEST SERPL-MCNC: 189 MG/DL
CO2 SERPL-SCNC: 30 MMOL/L (ref 21–32)
CREAT SERPL-MCNC: 0.92 MG/DL (ref 0.55–1.02)
EST. AVERAGE GLUCOSE BLD GHB EST-MCNC: 114 MG/DL
GLOBULIN SER CALC-MCNC: 2.9 G/DL (ref 2–4)
GLUCOSE SERPL-MCNC: 113 MG/DL (ref 65–100)
HBA1C MFR BLD: 5.6 % (ref 4–5.6)
HDLC SERPL-MCNC: 85 MG/DL
HDLC SERPL: 2.2 (ref 0–5)
LDLC SERPL CALC-MCNC: 85.2 MG/DL (ref 0–100)
POTASSIUM SERPL-SCNC: 4.3 MMOL/L (ref 3.5–5.1)
PROT SERPL-MCNC: 6.7 G/DL (ref 6.4–8.2)
SODIUM SERPL-SCNC: 140 MMOL/L (ref 136–145)
TRIGL SERPL-MCNC: 94 MG/DL
VLDLC SERPL CALC-MCNC: 18.8 MG/DL

## 2024-05-13 RX ORDER — ATENOLOL 50 MG/1
TABLET ORAL
Qty: 90 TABLET | Refills: 3 | Status: SHIPPED | OUTPATIENT
Start: 2024-05-13

## 2024-06-14 RX ORDER — PRAVASTATIN SODIUM 40 MG
TABLET ORAL
Qty: 90 TABLET | Refills: 3 | Status: SHIPPED | OUTPATIENT
Start: 2024-06-14

## 2024-07-09 ENCOUNTER — TELEPHONE (OUTPATIENT)
Age: 74
End: 2024-07-09

## 2024-07-09 NOTE — TELEPHONE ENCOUNTER
Left VM for pt to call office back. If pt calls back she would like to schedule an acute visit for pain in right knee. Please schedule pt with available provider.-SUMI 7/9/24

## 2024-07-11 ENCOUNTER — OFFICE VISIT (OUTPATIENT)
Age: 74
End: 2024-07-11
Payer: MEDICARE

## 2024-07-11 VITALS
HEIGHT: 66 IN | TEMPERATURE: 98.4 F | WEIGHT: 198.4 LBS | RESPIRATION RATE: 16 BRPM | OXYGEN SATURATION: 98 % | BODY MASS INDEX: 31.88 KG/M2 | SYSTOLIC BLOOD PRESSURE: 143 MMHG | HEART RATE: 59 BPM | DIASTOLIC BLOOD PRESSURE: 76 MMHG

## 2024-07-11 DIAGNOSIS — M25.561 CHRONIC PAIN OF RIGHT KNEE: Primary | ICD-10-CM

## 2024-07-11 DIAGNOSIS — I10 ESSENTIAL HYPERTENSION: ICD-10-CM

## 2024-07-11 DIAGNOSIS — G89.29 CHRONIC PAIN OF RIGHT KNEE: Primary | ICD-10-CM

## 2024-07-11 PROCEDURE — 99213 OFFICE O/P EST LOW 20 MIN: CPT | Performed by: NURSE PRACTITIONER

## 2024-07-11 PROCEDURE — 3077F SYST BP >= 140 MM HG: CPT | Performed by: NURSE PRACTITIONER

## 2024-07-11 PROCEDURE — G8417 CALC BMI ABV UP PARAM F/U: HCPCS | Performed by: NURSE PRACTITIONER

## 2024-07-11 PROCEDURE — 1036F TOBACCO NON-USER: CPT | Performed by: NURSE PRACTITIONER

## 2024-07-11 PROCEDURE — 1123F ACP DISCUSS/DSCN MKR DOCD: CPT | Performed by: NURSE PRACTITIONER

## 2024-07-11 PROCEDURE — G8399 PT W/DXA RESULTS DOCUMENT: HCPCS | Performed by: NURSE PRACTITIONER

## 2024-07-11 PROCEDURE — G8427 DOCREV CUR MEDS BY ELIG CLIN: HCPCS | Performed by: NURSE PRACTITIONER

## 2024-07-11 PROCEDURE — 3017F COLORECTAL CA SCREEN DOC REV: CPT | Performed by: NURSE PRACTITIONER

## 2024-07-11 PROCEDURE — 3078F DIAST BP <80 MM HG: CPT | Performed by: NURSE PRACTITIONER

## 2024-07-11 PROCEDURE — 1090F PRES/ABSN URINE INCON ASSESS: CPT | Performed by: NURSE PRACTITIONER

## 2024-07-11 RX ORDER — CETIRIZINE HYDROCHLORIDE 10 MG/1
10 TABLET ORAL DAILY
COMMUNITY

## 2024-07-11 NOTE — PROGRESS NOTES
Assessment/Plan:     1. Chronic pain of right knee  -     XR KNEE RIGHT (3 VIEWS); Future  If x-ray is unremarkable consider referral to physical therapy for evaluation and treatment.  2. Essential hypertension  Elevated today.  She will call back with home pressures.    No follow-ups on file.     Discussed expected course/resolution/complications of diagnosis in detail with patient.    Medication risks/benefits/costs/interactions/alternatives discussed with patient.    Pt was given after visit summary which includes diagnoses, current medications & vitals.   Pt expressed understanding with the diagnosis and plan          Subjective:      Kierra Lakhani is a 73 y.o. female who presents for had concerns including Knee Pain (Right knee for three weeks.).     Arianne presents to discuss a history of right knee pain, worsening for the last 3 weeks.  She knows no known injury.  This is her first evaluation.  She has attempted OTC treatment with some improvement.  Symptoms are longstanding worsening over time.    Patient Active Problem List   Diagnosis    ACP (advance care planning)    Hyperlipidemia LDL goal <100    Combined forms of age-related cataract of right eye    Incomplete rectal prolapse    Situational stress    Obesity (BMI 30-39.9)    Diabetes mellitus without complication (HCC)    Allergic rhinitis due to pollen    History of motion sickness    AR (allergic rhinitis)    HBP (high blood pressure)    Benign papule    Gastroesophageal reflux disease without esophagitis    Calculus of kidney    Combined forms of age-related cataract of left eye    Severe obesity (BMI 35.0-35.9 with comorbidity) (HCC)    Bullous pemphigoid    Severe obesity (BMI 35.0-39.9) with comorbidity (HCC)    Type 2 diabetes mellitus with chronic kidney disease    Chronic kidney disease, stage 3a (HCC)       Current Outpatient Medications   Medication Sig Dispense Refill    cetirizine (ZYRTEC) 10 MG tablet Take 1 tablet by mouth daily

## 2024-07-11 NOTE — PROGRESS NOTES
Chief Complaint   Patient presents with    Knee Pain     Right knee for three weeks.     \"Have you been to the ER, urgent care clinic since your last visit?  Hospitalized since your last visit?\"    NO    “Have you seen or consulted any other health care providers outside of [facility] since your last visit?”    NO

## 2024-07-15 ASSESSMENT — ENCOUNTER SYMPTOMS: SHORTNESS OF BREATH: 0

## 2024-07-29 ENCOUNTER — PATIENT MESSAGE (OUTPATIENT)
Age: 74
End: 2024-07-29

## 2024-07-29 DIAGNOSIS — R89.9 ABNORMAL LABORATORY TEST RESULT: ICD-10-CM

## 2024-07-29 DIAGNOSIS — R94.6 ABNORMAL RESULTS OF THYROID FUNCTION STUDIES: ICD-10-CM

## 2024-07-29 DIAGNOSIS — M11.20 PSEUDOGOUT: Primary | ICD-10-CM

## 2024-08-14 ENCOUNTER — LAB (OUTPATIENT)
Age: 74
End: 2024-08-14

## 2024-08-14 DIAGNOSIS — R89.9 ABNORMAL LABORATORY TEST RESULT: ICD-10-CM

## 2024-08-14 DIAGNOSIS — M11.20 PSEUDOGOUT: ICD-10-CM

## 2024-08-14 DIAGNOSIS — R94.6 ABNORMAL RESULTS OF THYROID FUNCTION STUDIES: ICD-10-CM

## 2024-08-15 LAB
MAGNESIUM SERPL-MCNC: 1.7 MG/DL (ref 1.6–2.4)
TSH SERPL DL<=0.05 MIU/L-ACNC: 1.02 UIU/ML (ref 0.36–3.74)

## 2024-09-01 SDOH — ECONOMIC STABILITY: INCOME INSECURITY: HOW HARD IS IT FOR YOU TO PAY FOR THE VERY BASICS LIKE FOOD, HOUSING, MEDICAL CARE, AND HEATING?: NOT HARD AT ALL

## 2024-09-01 SDOH — ECONOMIC STABILITY: FOOD INSECURITY: WITHIN THE PAST 12 MONTHS, YOU WORRIED THAT YOUR FOOD WOULD RUN OUT BEFORE YOU GOT MONEY TO BUY MORE.: NEVER TRUE

## 2024-09-01 SDOH — ECONOMIC STABILITY: FOOD INSECURITY: WITHIN THE PAST 12 MONTHS, THE FOOD YOU BOUGHT JUST DIDN'T LAST AND YOU DIDN'T HAVE MONEY TO GET MORE.: NEVER TRUE

## 2024-09-01 SDOH — ECONOMIC STABILITY: TRANSPORTATION INSECURITY
IN THE PAST 12 MONTHS, HAS LACK OF TRANSPORTATION KEPT YOU FROM MEETINGS, WORK, OR FROM GETTING THINGS NEEDED FOR DAILY LIVING?: NO

## 2024-09-04 ENCOUNTER — OFFICE VISIT (OUTPATIENT)
Age: 74
End: 2024-09-04
Payer: MEDICARE

## 2024-09-04 VITALS
HEIGHT: 66 IN | DIASTOLIC BLOOD PRESSURE: 78 MMHG | SYSTOLIC BLOOD PRESSURE: 129 MMHG | TEMPERATURE: 98.2 F | HEART RATE: 68 BPM | BODY MASS INDEX: 32.14 KG/M2 | OXYGEN SATURATION: 99 % | WEIGHT: 200 LBS | RESPIRATION RATE: 16 BRPM

## 2024-09-04 DIAGNOSIS — M25.561 ACUTE PAIN OF RIGHT KNEE: Primary | ICD-10-CM

## 2024-09-04 DIAGNOSIS — I10 ESSENTIAL HYPERTENSION: ICD-10-CM

## 2024-09-04 DIAGNOSIS — N18.30 DIABETES MELLITUS DUE TO UNDERLYING CONDITION WITH STAGE 3 CHRONIC KIDNEY DISEASE, WITHOUT LONG-TERM CURRENT USE OF INSULIN, UNSPECIFIED WHETHER STAGE 3A OR 3B CKD (HCC): ICD-10-CM

## 2024-09-04 DIAGNOSIS — E78.5 HYPERLIPIDEMIA LDL GOAL <100: ICD-10-CM

## 2024-09-04 DIAGNOSIS — E11.22 TYPE 2 DIABETES MELLITUS WITH CHRONIC KIDNEY DISEASE, WITHOUT LONG-TERM CURRENT USE OF INSULIN, UNSPECIFIED CKD STAGE (HCC): ICD-10-CM

## 2024-09-04 DIAGNOSIS — E08.22 DIABETES MELLITUS DUE TO UNDERLYING CONDITION WITH STAGE 3 CHRONIC KIDNEY DISEASE, WITHOUT LONG-TERM CURRENT USE OF INSULIN, UNSPECIFIED WHETHER STAGE 3A OR 3B CKD (HCC): ICD-10-CM

## 2024-09-04 PROCEDURE — 3017F COLORECTAL CA SCREEN DOC REV: CPT | Performed by: FAMILY MEDICINE

## 2024-09-04 PROCEDURE — 3044F HG A1C LEVEL LT 7.0%: CPT | Performed by: FAMILY MEDICINE

## 2024-09-04 PROCEDURE — 1123F ACP DISCUSS/DSCN MKR DOCD: CPT | Performed by: FAMILY MEDICINE

## 2024-09-04 PROCEDURE — 3078F DIAST BP <80 MM HG: CPT | Performed by: FAMILY MEDICINE

## 2024-09-04 PROCEDURE — G8399 PT W/DXA RESULTS DOCUMENT: HCPCS | Performed by: FAMILY MEDICINE

## 2024-09-04 PROCEDURE — 99213 OFFICE O/P EST LOW 20 MIN: CPT | Performed by: FAMILY MEDICINE

## 2024-09-04 PROCEDURE — 2022F DILAT RTA XM EVC RTNOPTHY: CPT | Performed by: FAMILY MEDICINE

## 2024-09-04 PROCEDURE — 1036F TOBACCO NON-USER: CPT | Performed by: FAMILY MEDICINE

## 2024-09-04 PROCEDURE — 1090F PRES/ABSN URINE INCON ASSESS: CPT | Performed by: FAMILY MEDICINE

## 2024-09-04 PROCEDURE — 3074F SYST BP LT 130 MM HG: CPT | Performed by: FAMILY MEDICINE

## 2024-09-04 PROCEDURE — G8427 DOCREV CUR MEDS BY ELIG CLIN: HCPCS | Performed by: FAMILY MEDICINE

## 2024-09-04 PROCEDURE — G8417 CALC BMI ABV UP PARAM F/U: HCPCS | Performed by: FAMILY MEDICINE

## 2024-09-04 ASSESSMENT — PATIENT HEALTH QUESTIONNAIRE - PHQ9
SUM OF ALL RESPONSES TO PHQ QUESTIONS 1-9: 0
1. LITTLE INTEREST OR PLEASURE IN DOING THINGS: NOT AT ALL
SUM OF ALL RESPONSES TO PHQ9 QUESTIONS 1 & 2: 0
SUM OF ALL RESPONSES TO PHQ QUESTIONS 1-9: 0
2. FEELING DOWN, DEPRESSED OR HOPELESS: NOT AT ALL

## 2024-09-17 ENCOUNTER — HOSPITAL ENCOUNTER (OUTPATIENT)
Facility: HOSPITAL | Age: 74
Discharge: HOME | End: 2024-09-20
Attending: ORTHOPAEDIC SURGERY
Payer: MEDICARE

## 2024-09-17 DIAGNOSIS — M23.203 DEGENERATIVE TEAR OF MEDIAL MENISCUS OF RIGHT KNEE: ICD-10-CM

## 2024-09-17 PROCEDURE — 73721 MRI JNT OF LWR EXTRE W/O DYE: CPT

## 2024-10-07 ENCOUNTER — HOSPITAL ENCOUNTER (EMERGENCY)
Facility: HOSPITAL | Age: 74
Discharge: HOME | End: 2024-10-07
Attending: EMERGENCY MEDICINE
Payer: MEDICARE

## 2024-10-07 VITALS
HEIGHT: 66 IN | TEMPERATURE: 98.2 F | HEART RATE: 73 BPM | DIASTOLIC BLOOD PRESSURE: 92 MMHG | OXYGEN SATURATION: 100 % | WEIGHT: 193 LBS | RESPIRATION RATE: 16 BRPM | BODY MASS INDEX: 31.02 KG/M2 | SYSTOLIC BLOOD PRESSURE: 153 MMHG

## 2024-10-07 DIAGNOSIS — I48.92 ATRIAL FLUTTER, UNSPECIFIED TYPE (HCC): Primary | ICD-10-CM

## 2024-10-07 LAB
ALBUMIN SERPL-MCNC: 3.3 G/DL (ref 3.5–5)
ALBUMIN/GLOB SERPL: 1 (ref 1.1–2.2)
ALP SERPL-CCNC: 96 U/L (ref 45–117)
ALT SERPL-CCNC: 41 U/L (ref 12–78)
ANION GAP SERPL CALC-SCNC: 8 MMOL/L (ref 2–12)
AST SERPL-CCNC: 19 U/L (ref 15–37)
BASOPHILS # BLD: 0 K/UL (ref 0–0.1)
BASOPHILS NFR BLD: 0 % (ref 0–1)
BILIRUB SERPL-MCNC: 0.5 MG/DL (ref 0.2–1)
BUN SERPL-MCNC: 20 MG/DL (ref 6–20)
BUN/CREAT SERPL: 15 (ref 12–20)
CALCIUM SERPL-MCNC: 9.6 MG/DL (ref 8.5–10.1)
CHLORIDE SERPL-SCNC: 102 MMOL/L (ref 97–108)
CO2 SERPL-SCNC: 27 MMOL/L (ref 21–32)
COMMENT:: NORMAL
CREAT SERPL-MCNC: 1.35 MG/DL (ref 0.55–1.02)
DIFFERENTIAL METHOD BLD: ABNORMAL
EOSINOPHIL # BLD: 0 K/UL (ref 0–0.4)
EOSINOPHIL NFR BLD: 0 % (ref 0–7)
ERYTHROCYTE [DISTWIDTH] IN BLOOD BY AUTOMATED COUNT: 12.2 % (ref 11.5–14.5)
GLOBULIN SER CALC-MCNC: 3.4 G/DL (ref 2–4)
GLUCOSE SERPL-MCNC: 177 MG/DL (ref 65–100)
HCT VFR BLD AUTO: 42.6 % (ref 35–47)
HGB BLD-MCNC: 14.8 G/DL (ref 11.5–16)
IMM GRANULOCYTES # BLD AUTO: 0.1 K/UL (ref 0–0.04)
IMM GRANULOCYTES NFR BLD AUTO: 1 % (ref 0–0.5)
LYMPHOCYTES # BLD: 3 K/UL (ref 0.8–3.5)
LYMPHOCYTES NFR BLD: 28 % (ref 12–49)
MAGNESIUM SERPL-MCNC: 1.6 MG/DL (ref 1.6–2.4)
MCH RBC QN AUTO: 34.2 PG (ref 26–34)
MCHC RBC AUTO-ENTMCNC: 34.7 G/DL (ref 30–36.5)
MCV RBC AUTO: 98.4 FL (ref 80–99)
MONOCYTES # BLD: 0.9 K/UL (ref 0–1)
MONOCYTES NFR BLD: 8 % (ref 5–13)
NEUTS SEG # BLD: 6.7 K/UL (ref 1.8–8)
NEUTS SEG NFR BLD: 63 % (ref 32–75)
NRBC # BLD: 0 K/UL (ref 0–0.01)
NRBC BLD-RTO: 0 PER 100 WBC
PLATELET # BLD AUTO: 313 K/UL (ref 150–400)
PMV BLD AUTO: 10.7 FL (ref 8.9–12.9)
POTASSIUM SERPL-SCNC: 3.6 MMOL/L (ref 3.5–5.1)
PROT SERPL-MCNC: 6.7 G/DL (ref 6.4–8.2)
RBC # BLD AUTO: 4.33 M/UL (ref 3.8–5.2)
SODIUM SERPL-SCNC: 137 MMOL/L (ref 136–145)
SPECIMEN HOLD: NORMAL
TROPONIN I SERPL HS-MCNC: 13 NG/L (ref 0–51)
TSH SERPL DL<=0.05 MIU/L-ACNC: 0.86 UIU/ML (ref 0.36–3.74)
WBC # BLD AUTO: 10.8 K/UL (ref 3.6–11)

## 2024-10-07 PROCEDURE — 80053 COMPREHEN METABOLIC PANEL: CPT

## 2024-10-07 PROCEDURE — 93005 ELECTROCARDIOGRAM TRACING: CPT | Performed by: EMERGENCY MEDICINE

## 2024-10-07 PROCEDURE — 99284 EMERGENCY DEPT VISIT MOD MDM: CPT

## 2024-10-07 PROCEDURE — 83735 ASSAY OF MAGNESIUM: CPT

## 2024-10-07 PROCEDURE — 85025 COMPLETE CBC W/AUTO DIFF WBC: CPT

## 2024-10-07 PROCEDURE — 84484 ASSAY OF TROPONIN QUANT: CPT

## 2024-10-07 PROCEDURE — 36415 COLL VENOUS BLD VENIPUNCTURE: CPT

## 2024-10-07 PROCEDURE — 84443 ASSAY THYROID STIM HORMONE: CPT

## 2024-10-07 ASSESSMENT — LIFESTYLE VARIABLES
HOW MANY STANDARD DRINKS CONTAINING ALCOHOL DO YOU HAVE ON A TYPICAL DAY: PATIENT DOES NOT DRINK
HOW OFTEN DO YOU HAVE A DRINK CONTAINING ALCOHOL: NEVER

## 2024-10-07 ASSESSMENT — PAIN - FUNCTIONAL ASSESSMENT: PAIN_FUNCTIONAL_ASSESSMENT: 0-10

## 2024-10-07 NOTE — ED NOTES
Cards saw pt at this time  
Patient ambulatory out at this time. Patient has discharge paperwork in hand. Patient family driving patient home. Patient is A&Ox4, on RA, VSS, and is in NAD at the time of discharge.     
19-Oct-2023 21:45

## 2024-10-07 NOTE — CONSULTS
MD Jennifer at [facility] ENDOSCOPY    COLONOSCOPY  2007    repeat in 2017    HEENT Right     macular hole repair on right eye    KNEE ARTHROSCOPY Left 02/15/2017    meniscus     Family History   Problem Relation Age of Onset    Heart Attack Paternal Grandfather 70    Cancer Other         melanoma in daughter    Arrhythmia Paternal Grandmother 94        had a pacemaker    Alcohol Abuse Father 73    Arrhythmia Mother         a. fib    Elevated Lipids Mother     Hypertension Mother 86     Social History     Tobacco Use    Smoking status: Never    Smokeless tobacco: Never   Substance Use Topics    Alcohol use: Yes    Drug use: No         ROS:       10 point review of systems completed by myself and is negative or not relevant unless noted in the HPI.      Objective     OBJECTIVE:  BP (!) 144/80   Pulse 62   Temp 98.2 °F (36.8 °C) (Oral)   Resp 14   Ht 1.676 m (5' 6\")   Wt 87.5 kg (193 lb)   SpO2 100%   BMI 31.15 kg/m²      GEN:                Well developed, well nourished  NECK:     No jugular venous distension, no thyromegaly, no carotid bruit  CV:      Irregularly irregular rhythm , no murmurs, No S3/S4  RESP:     Clear to ascultation  EXT:    No edema  NEURO/PSY: Alert, oriented, normal mood/affect    No intake/output data recorded.  Patient Vitals for the past 24 hrs:   BP Temp Temp src Pulse Resp SpO2 Height Weight   10/07/24 1317 (!) 144/80 -- -- 62 14 100 % -- --   10/07/24 1230 (!) 140/74 -- -- 67 17 98 % -- --   10/07/24 1217 138/76 -- -- 62 17 97 % -- --   10/07/24 1200 (!) 141/72 -- -- 72 13 98 % -- --   10/07/24 1145 135/69 -- -- 69 18 99 % -- --   10/07/24 1130 (!) 149/94 -- -- 72 20 99 % -- --   10/07/24 1120 (!) 175/90 -- -- 92 17 99 % -- --   10/07/24 1032 (!) 151/78 98.2 °F (36.8 °C) Oral 99 18 100 % 1.676 m (5' 6\") 87.5 kg (193 lb)     Wt Readings from Last 4 Encounters:   10/07/24 87.5 kg (193 lb)   09/04/24 90.7 kg (200 lb)   07/11/24 90 kg (198 lb 6.4 oz)   03/05/24 88.9 kg (196 lb)

## 2024-10-07 NOTE — ED PROVIDER NOTES
symptoms worsen. Discharge instructions have been provided and explained to the patient, along with reasons to return to the ED.        CLINICAL IMPRESSION    1. Atrial flutter, unspecified type (HCC)         DISPOSITION  Discharge     PATIENT REFERRED TO:  Dean Soto MD  [address] [facility] [address] [phone]    Schedule an appointment as soon as possible for a visit       Hero Dietz MD  [address]  [address]  [place] [id number] [phone]    Schedule an appointment as soon as possible for a visit       [facility] EMERGENCY DEPT  [address] [facility] [address] [phone]  Go to   If symptoms worsen, As needed        DISCHARGE MEDICATIONS:     Medication List        START taking these medications      apixaban 5 MG Tabs tablet  Commonly known as: Eliquis  Take 1 tablet by mouth 2 times daily            ASK your doctor about these medications      atenolol 50 MG tablet  Commonly known as: TENORMIN  TAKE 1 TABLET BY MOUTH DAILY     cetirizine 10 MG tablet  Commonly known as: ZYRTEC     Dupixent 300 MG/2ML Sopn injection  Generic drug: dupilumab     esomeprazole 20 MG delayed release capsule  Commonly known as: NexIUM     famotidine 20 MG tablet  Commonly known as: PEPCID     fish oil 1000 MG capsule     letrozole 2.5 MG tablet  Commonly known as: FEMARA     metFORMIN 500 MG extended release tablet  Commonly known as: GLUCOPHAGE-XR  Take 1 tablet by mouth daily (with breakfast)     pravastatin 40 MG tablet  Commonly known as: PRAVACHOL  TAKE 1 TABLET BY MOUTH DAILY     scopolamine transdermal patch  Commonly known as: TRANSDERM-SCOP  APPLY 1 PATCH TOPICALLY TO  HAIRLESS AREA BEHIND ONE EAR  EVERY 72 HOURS     vitamin D 25 MCG (1000 UT) Caps               Where to Get Your Medications        These medications were sent to [facility]/pharmacy #[id number] - [place], [place] - [address] [facility] - P [phone] - F [phone] [address] [facility] [address]      Phone:

## 2024-10-07 NOTE — DISCHARGE INSTRUCTIONS
You were evaluated in the emergency department for atrial flutter.  Your examination was reassuring as was your work-up including lab work and EKG.  Cardiology saw you and recommended starting you on a blood thinner, apixaban, please take as directed.  It will be important for you to follow-up with your primary care physician in 3-5 days as well as follow-up with cardiology .  If you develop worsening symptoms such as worsening weakness, shakiness, or any chest pain, shortness of breath, palpitations, lightheadedness, or passing out episodes, please return to the emergency department immediately.

## 2024-10-08 LAB
EKG ATRIAL RATE: 80 BPM
EKG DIAGNOSIS: NORMAL
EKG P-R INTERVAL: 134 MS
EKG Q-T INTERVAL: 348 MS
EKG QRS DURATION: 82 MS
EKG QTC CALCULATION (BAZETT): 401 MS
EKG R AXIS: 14 DEGREES
EKG T AXIS: -10 DEGREES
EKG VENTRICULAR RATE: 80 BPM

## 2024-10-21 ENCOUNTER — ANESTHESIA EVENT (OUTPATIENT)
Facility: HOSPITAL | Age: 74
End: 2024-10-21
Payer: MEDICARE

## 2024-10-21 NOTE — ANESTHESIA PRE PROCEDURE
Department of Anesthesiology  Preprocedure Note       Name:  Kierra Lakhani   Age:  74 y.o.  :  1950                                          MRN:  [id number]         Date:  10/21/2024      Surgeon: Surgeon(s):  Dionisio Rodriguez MD    Procedure: Procedure(s):  ESOPHAGOGASTRODUODENOSCOPY DILATION BALLOON    Medications prior to admission:   Prior to Admission medications    Medication Sig Start Date End Date Taking? Authorizing Provider   cetirizine (ZYRTEC) 10 MG tablet Take 1 tablet by mouth daily   Yes Melissa Samayoa MD   pravastatin (PRAVACHOL) 40 MG tablet TAKE 1 TABLET BY MOUTH DAILY  Patient taking differently: Take 1 tablet by mouth daily 24  Yes Dean Soto MD   atenolol (TENORMIN) 50 MG tablet TAKE 1 TABLET BY MOUTH DAILY  Patient taking differently: Take 1 tablet by mouth daily 24  Yes Dean oSto MD   letrozole (FEMARA) 2.5 MG tablet Take 1 tablet by mouth daily 10/5/23  Yes ProviderMelissa MD   Omega-3 Fatty Acids (FISH OIL) 1000 MG capsule Take 1 capsule by mouth daily   Yes ProviderMelissa MD   vitamin D 25 MCG (1000 UT) CAPS Take 1 capsule by mouth daily   Yes Automatic Reconciliation, Ar   esomeprazole (NEXIUM) 20 MG delayed release capsule Take 1 capsule by mouth daily   Yes Automatic Reconciliation, Ar   apixaban (ELIQUIS) 5 MG TABS tablet Take 1 tablet by mouth 2 times daily 10/7/24   Flex Perry MD   scopolamine (TRANSDERM-SCOP) transdermal patch APPLY 1 PATCH TOPICALLY TO  HAIRLESS AREA BEHIND ONE EAR  EVERY 72 HOURS  Patient taking differently: Place 1 patch onto the skin as needed 24   Dean Soto MD   famotidine (PEPCID) 20 MG tablet Take 1 tablet by mouth nightly as needed    ProviderMelissa MD   dupilumab (DUPIXENT) 300 MG/2ML SOPN injection Inject 2 mLs into the skin every 14 days ceived the following from [facility]: Outside name: Dupixent Pen 300 mg/2 mL pnij 22   Automatic Reconciliation, Ar       Current

## 2024-10-22 ENCOUNTER — ANESTHESIA (OUTPATIENT)
Facility: HOSPITAL | Age: 74
End: 2024-10-22
Payer: MEDICARE

## 2024-10-22 ENCOUNTER — HOSPITAL ENCOUNTER (OUTPATIENT)
Facility: HOSPITAL | Age: 74
Setting detail: OUTPATIENT SURGERY
Discharge: HOME | End: 2024-10-22
Attending: SPECIALIST | Admitting: SPECIALIST
Payer: MEDICARE

## 2024-10-22 VITALS
HEIGHT: 65 IN | TEMPERATURE: 97.3 F | BODY MASS INDEX: 32.99 KG/M2 | HEART RATE: 60 BPM | RESPIRATION RATE: 15 BRPM | SYSTOLIC BLOOD PRESSURE: 114 MMHG | DIASTOLIC BLOOD PRESSURE: 56 MMHG | OXYGEN SATURATION: 98 % | WEIGHT: 198 LBS

## 2024-10-22 PROCEDURE — 2580000003 HC RX 258: Performed by: NURSE ANESTHETIST, CERTIFIED REGISTERED

## 2024-10-22 PROCEDURE — 3600007512: Performed by: SPECIALIST

## 2024-10-22 PROCEDURE — 2709999900 HC NON-CHARGEABLE SUPPLY: Performed by: SPECIALIST

## 2024-10-22 PROCEDURE — 3700000000 HC ANESTHESIA ATTENDED CARE: Performed by: SPECIALIST

## 2024-10-22 PROCEDURE — 88305 TISSUE EXAM BY PATHOLOGIST: CPT

## 2024-10-22 PROCEDURE — 2500000003 HC RX 250 WO HCPCS: Performed by: NURSE ANESTHETIST, CERTIFIED REGISTERED

## 2024-10-22 PROCEDURE — 3700000001 HC ADD 15 MINUTES (ANESTHESIA): Performed by: SPECIALIST

## 2024-10-22 PROCEDURE — C1769 GUIDE WIRE: HCPCS | Performed by: SPECIALIST

## 2024-10-22 PROCEDURE — 3600007502: Performed by: SPECIALIST

## 2024-10-22 PROCEDURE — 7100000010 HC PHASE II RECOVERY - FIRST 15 MIN: Performed by: SPECIALIST

## 2024-10-22 PROCEDURE — 6360000002 HC RX W HCPCS: Performed by: NURSE ANESTHETIST, CERTIFIED REGISTERED

## 2024-10-22 RX ORDER — SODIUM CHLORIDE 0.9 % (FLUSH) 0.9 %
5-40 SYRINGE (ML) INJECTION EVERY 12 HOURS SCHEDULED
Status: DISCONTINUED | OUTPATIENT
Start: 2024-10-22 | End: 2024-10-22 | Stop reason: HOSPADM

## 2024-10-22 RX ORDER — SODIUM CHLORIDE 0.9 % (FLUSH) 0.9 %
5-40 SYRINGE (ML) INJECTION PRN
Status: DISCONTINUED | OUTPATIENT
Start: 2024-10-22 | End: 2024-10-22 | Stop reason: HOSPADM

## 2024-10-22 RX ORDER — 0.9 % SODIUM CHLORIDE 0.9 %
INTRAVENOUS SOLUTION INTRAVENOUS
Status: DISCONTINUED | OUTPATIENT
Start: 2024-10-22 | End: 2024-10-22 | Stop reason: SDUPTHER

## 2024-10-22 RX ORDER — SODIUM CHLORIDE 9 MG/ML
INJECTION, SOLUTION INTRAVENOUS PRN
Status: DISCONTINUED | OUTPATIENT
Start: 2024-10-22 | End: 2024-10-22 | Stop reason: HOSPADM

## 2024-10-22 RX ADMIN — SODIUM CHLORIDE 200 ML: 9 INJECTION, SOLUTION INTRAVENOUS at 12:15

## 2024-10-22 RX ADMIN — PROPOFOL 100 MG: 10 INJECTION, EMULSION INTRAVENOUS at 12:10

## 2024-10-22 RX ADMIN — PROPOFOL 100 MG: 10 INJECTION, EMULSION INTRAVENOUS at 12:07

## 2024-10-22 RX ADMIN — LIDOCAINE HYDROCHLORIDE 100 MG: 20 INJECTION, SOLUTION EPIDURAL; INFILTRATION; INTRACAUDAL; PERINEURAL at 12:04

## 2024-10-22 RX ADMIN — PROPOFOL 100 MG: 10 INJECTION, EMULSION INTRAVENOUS at 12:04

## 2024-10-22 ASSESSMENT — PAIN - FUNCTIONAL ASSESSMENT: PAIN_FUNCTIONAL_ASSESSMENT: 0-10

## 2024-10-22 NOTE — PROGRESS NOTES
ARRIVAL INFORMATION:  Verified patient name and date of birth, scheduled procedure, and informed consent.     : Beba (daughter) contact number: [phone]  Physician and staff can share information with the .     Receive texts: yes    Belongings with patient include:  Clothing,Jewelry, phone (4 rings on patient)    GI FOCUSED ASSESSMENT:  Neuro: Awake, alert, oriented x4  Respiratory: even and unlabored   GI: soft and non-distended  EKG Rhythm: normal sinus rhythm    Education:Reviewed general discharge instructions and  information.  The risks and benefits of the bite block have been explained to patient.  Patient verbalizes understanding.

## 2024-10-22 NOTE — DISCHARGE INSTRUCTIONS
Patient Education     Middle Ear Infection (Adult)  You have an infection of the middle ear, the space behind the eardrum. This is also called acute otitis media (AOM). Sometimes it is caused by the common cold. This is because congestion can block the internal passage (eustachian tube) that drains fluid from the middle ear. When the middle ear fills with fluid, bacteria can grow there and cause an infection. Oral antibiotics are used to treat this illness, not ear drops. Symptoms usually start to improve within 1 to 2 days of treatment.    Home care  The following are general care guidelines:    Finish all of the antibiotic medicine given, even though you may feel better after the first few days.    You may use over-the-counter medicine, such as acetaminophen or ibuprofen, to control pain and fever, unless something else was prescribed. If you have chronic liver or kidney disease or have ever had a stomach ulcer or gastrointestinal bleeding, talk with your healthcare provider before using these medicines. Do not give aspirin to anyone under 18 years of age who has a fever. It may cause severe illness or death.  Follow-up care  Follow up with your healthcare provider, or as advised, in 2 weeks if all symptoms have not gotten better, or if hearing doesn't go back to normal within 1 month.  When to seek medical advice  Call your healthcare provider right away if any of these occur:    Ear pain gets worse or does not improve after 3 days of treatment    Unusual drowsiness or confusion    Neck pain, stiff neck, or headache    Fluid or blood draining from the ear canal    Fever of 100.4 F (38 C) or as advised     Seizure  Date Last Reviewed: 6/1/2016 2000-2018 [employer] [address] [place] [address], [place] [address]. All rights reserved. This information is not intended as a substitute for professional medical care. Always follow your healthcare professional's instructions.            foods, high-fat foods, or drinks with caffeine in them, such as tea, coffee, ivonne, or energy drinks. If your symptoms are worse after you eat a certain food, you may want to stop eating it to see if your symptoms get better.  When should you call for help?   Call 911 anytime you think you may need emergency care. For example, call if:    You passed out (lost consciousness).     You have severe belly pain.     You vomit blood or what looks like coffee grounds.     Your stools are maroon or very bloody.   Call your doctor now or seek immediate medical care if:    You are dizzy or lightheaded, or feel like you may faint.     You have trouble breathing or are breathing faster and passing only a little urine.     You have new or worse belly pain.     You have a new or higher fever.     You have signs of dehydration, such as:  Dry eyes and a dry mouth.  Passing only a little urine.  Feeling thirstier than usual.     You have nausea or vomiting and can't keep fluids down.     You cannot pass stools or gas.     You have new or more blood in your stools or your stools are black and tarlike.   Watch closely for changes in your health, and be sure to contact your doctor if:    You have new or worse symptoms.     You are losing weight.     You do not get better as expected.   Where can you learn more?  Go to https://www.[employer].net/patientEd and enter Z536 to learn more about \"Gastritis: Care Instructions.\"  Current as of: October 19, 2023  Content Version: 14.2  © 2024 [employer].   Care instructions adapted under license by [employer]. If you have questions about a medical condition or this instruction, always ask your healthcare professional. [employer] disclaims any warranty or liability for your use of this information.         Learning About Zenker's Diverticulum  What is Zenker's diverticulum?     A Zenker's diverticulum is a pouch or bulge that forms in the esophagus. The esophagus is the

## 2024-10-22 NOTE — OP NOTE
Esophagogastroduodenoscopy Procedure Note      Kierra Lakhani  1950  [id number]    Indication: Dyphagia/odynophagia     Endoscopist: Dionisio Rodriguez MD    Referring Provider:  Dean Soto MD    Sedation:  MAC anesthesia Propofol    Procedure Details:  After infomed consent was obtained for the procedure, with all risks and benefits of procedure explained the patient was taken to the endoscopy suite and placed in the left lateral decubitus position.  Following sequential administration of sedation as per above, the endoscope was inserted into the mouth and advanced under direct vision to second portion of the duodenum.  A careful inspection was made as the gastroscope was withdrawn, including a retroflexed view of the proximal stomach; findings and interventions are described below.      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  + S/P Dilation with 51 FR Savary over wire  + Normal mucosa throughout esophagus s/p bx of mid esophagus.  Normal Z line at 39 cm s/p Bx.    Stomach:   + Mild erythema s/p Bx.    Duodenum:   - The bulb and post bulbar mucosa is normal in appearance to the second portion. The duodenal folds appeared normal.      Therapies:  see above    Specimen: Specimens were collected as described and send to the laboratory.           Complications:   None were encountered during the procedure.    EBL: < 10 ml.          Recommendations:   -GERD diet  -F/U post procedure      Dionisio Rodriguez MD  10/22/2024  12:22 PM

## 2024-10-22 NOTE — H&P
(ELIQUIS) 5 MG TABS tablet Take 1 tablet by mouth 2 times daily 10/7/24   Flex Perry MD   scopolamine (TRANSDERM-SCOP) transdermal patch APPLY 1 PATCH TOPICALLY TO  HAIRLESS AREA BEHIND ONE EAR  EVERY 72 HOURS  Patient taking differently: Place 1 patch onto the skin as needed 2/22/24   Dean Soto MD   famotidine (PEPCID) 20 MG tablet Take 1 tablet by mouth nightly as needed    Provider, MD Melissa   dupilumab (DUPIXENT) 300 MG/2ML SOPN injection Inject 2 mLs into the skin every 14 days ceived the following from [facility]: Outside name: Dupixent Pen 300 mg/2 mL pnij 8/11/22   Automatic Reconciliation, Ar       Physical Exam:   General: NAD   HEENT: Head: Normocephalic, no lesions, without obvious abnormality.   Heart: regular rate and rhythm, S1, S2 normal, no murmur, click, rub or gallop   Lungs: chest clear, no wheezing, rales, normal symmetric air entry   Abdominal: soft, NT/ND+ BS   Neurological: Grossly normal   Extremities: extremities normal, atraumatic, no cyanosis or edema     Findings/Diagnosis: Dysphagia    Plan of Care/Planned Procedure: EGD

## 2024-10-23 NOTE — ANESTHESIA POSTPROCEDURE EVALUATION
Department of Anesthesiology  Postprocedure Note    Patient: Kierra Lakhani  MRN: [id number]  YOB: 1950  Date of evaluation: 10/23/2024    Procedure Summary       Date: 10/22/24 Room / Location: [facility] ENDO 02 / [facility] ENDOSCOPY    Anesthesia Start: 1202 Anesthesia Stop: 1219    Procedure: ESOPHAGOGASTRODUODENOSCOPY DILATION BALLOON Diagnosis:       Esophageal dysphagia      Gastroesophageal reflux disease, unspecified whether esophagitis present      Hiatal hernia    Surgeons: Dionisio Rodriguez MD Responsible Provider: Tate Maier MD    Anesthesia Type: MAC ASA Status: 3            Anesthesia Type: MAC    Gilda Phase I: Gilda Score: 10    Gilda Phase II: Gilda Score: 10    Anesthesia Post Evaluation    Patient location during evaluation: PACU  Patient participation: complete - patient participated  Level of consciousness: sleepy but conscious and responsive to verbal stimuli  Pain score: 0  Airway patency: patent  Nausea & Vomiting: no vomiting and no nausea  Cardiovascular status: blood pressure returned to baseline and hemodynamically stable  Respiratory status: acceptable  Hydration status: stable    No notable events documented.

## 2024-10-28 ENCOUNTER — ANESTHESIA (OUTPATIENT)
Facility: HOSPITAL | Age: 74
End: 2024-10-28
Payer: MEDICARE

## 2024-10-28 ENCOUNTER — ANESTHESIA EVENT (OUTPATIENT)
Facility: HOSPITAL | Age: 74
End: 2024-10-28
Payer: MEDICARE

## 2024-10-28 ENCOUNTER — HOSPITAL ENCOUNTER (OUTPATIENT)
Facility: HOSPITAL | Age: 74
Setting detail: OUTPATIENT SURGERY
Discharge: HOME | End: 2024-10-28
Attending: INTERNAL MEDICINE | Admitting: INTERNAL MEDICINE
Payer: MEDICARE

## 2024-10-28 VITALS
DIASTOLIC BLOOD PRESSURE: 60 MMHG | HEIGHT: 65 IN | BODY MASS INDEX: 32.49 KG/M2 | SYSTOLIC BLOOD PRESSURE: 159 MMHG | HEART RATE: 57 BPM | RESPIRATION RATE: 21 BRPM | WEIGHT: 195 LBS | TEMPERATURE: 96.8 F | OXYGEN SATURATION: 99 %

## 2024-10-28 DIAGNOSIS — I48.19 PERSISTENT ATRIAL FIBRILLATION (HCC): Primary | ICD-10-CM

## 2024-10-28 DIAGNOSIS — I48.91 AFIB (HCC): ICD-10-CM

## 2024-10-28 LAB — ECHO BSA: 2.01 M2

## 2024-10-28 PROCEDURE — C1766 INTRO/SHEATH,STRBLE,NON-PEEL: HCPCS | Performed by: INTERNAL MEDICINE

## 2024-10-28 PROCEDURE — 3700000000 HC ANESTHESIA ATTENDED CARE: Performed by: INTERNAL MEDICINE

## 2024-10-28 PROCEDURE — 2720000010 HC SURG SUPPLY STERILE: Performed by: INTERNAL MEDICINE

## 2024-10-28 PROCEDURE — 2580000003 HC RX 258: Performed by: NURSE ANESTHETIST, CERTIFIED REGISTERED

## 2024-10-28 PROCEDURE — 93655 ICAR CATH ABLTJ DSCRT ARRHYT: CPT | Performed by: INTERNAL MEDICINE

## 2024-10-28 PROCEDURE — C1732 CATH, EP, DIAG/ABL, 3D/VECT: HCPCS | Performed by: INTERNAL MEDICINE

## 2024-10-28 PROCEDURE — C1759 CATH, INTRA ECHOCARDIOGRAPHY: HCPCS | Performed by: INTERNAL MEDICINE

## 2024-10-28 PROCEDURE — 2500000003 HC RX 250 WO HCPCS: Performed by: NURSE ANESTHETIST, CERTIFIED REGISTERED

## 2024-10-28 PROCEDURE — 2709999900 HC NON-CHARGEABLE SUPPLY: Performed by: INTERNAL MEDICINE

## 2024-10-28 PROCEDURE — 93656 COMPRE EP EVAL ABLTJ ATR FIB: CPT | Performed by: INTERNAL MEDICINE

## 2024-10-28 PROCEDURE — 76937 US GUIDE VASCULAR ACCESS: CPT | Performed by: INTERNAL MEDICINE

## 2024-10-28 PROCEDURE — C1893 INTRO/SHEATH, FIXED,NON-PEEL: HCPCS | Performed by: INTERNAL MEDICINE

## 2024-10-28 PROCEDURE — 93657 TX L/R ATRIAL FIB ADDL: CPT | Performed by: INTERNAL MEDICINE

## 2024-10-28 PROCEDURE — 85347 COAGULATION TIME ACTIVATED: CPT

## 2024-10-28 PROCEDURE — 6360000002 HC RX W HCPCS: Performed by: NURSE ANESTHETIST, CERTIFIED REGISTERED

## 2024-10-28 PROCEDURE — C1894 INTRO/SHEATH, NON-LASER: HCPCS | Performed by: INTERNAL MEDICINE

## 2024-10-28 PROCEDURE — 3700000001 HC ADD 15 MINUTES (ANESTHESIA): Performed by: INTERNAL MEDICINE

## 2024-10-28 RX ORDER — ROCURONIUM BROMIDE 10 MG/ML
INJECTION, SOLUTION INTRAVENOUS
Status: DISCONTINUED | OUTPATIENT
Start: 2024-10-28 | End: 2024-10-28 | Stop reason: SDUPTHER

## 2024-10-28 RX ORDER — ONDANSETRON 2 MG/ML
4 INJECTION INTRAMUSCULAR; INTRAVENOUS
Status: CANCELLED | OUTPATIENT
Start: 2024-10-28 | End: 2024-10-29

## 2024-10-28 RX ORDER — FENTANYL CITRATE 50 UG/ML
25 INJECTION, SOLUTION INTRAMUSCULAR; INTRAVENOUS EVERY 5 MIN PRN
Status: CANCELLED | OUTPATIENT
Start: 2024-10-28

## 2024-10-28 RX ORDER — MIDAZOLAM HYDROCHLORIDE 1 MG/ML
INJECTION, SOLUTION INTRAMUSCULAR; INTRAVENOUS
Status: DISCONTINUED | OUTPATIENT
Start: 2024-10-28 | End: 2024-10-28 | Stop reason: SDUPTHER

## 2024-10-28 RX ORDER — DEXTROSE MONOHYDRATE 100 MG/ML
INJECTION, SOLUTION INTRAVENOUS CONTINUOUS PRN
Status: CANCELLED | OUTPATIENT
Start: 2024-10-28

## 2024-10-28 RX ORDER — SODIUM CHLORIDE 0.9 % (FLUSH) 0.9 %
5-40 SYRINGE (ML) INJECTION EVERY 12 HOURS SCHEDULED
Status: CANCELLED | OUTPATIENT
Start: 2024-10-28

## 2024-10-28 RX ORDER — SODIUM CHLORIDE 0.9 % (FLUSH) 0.9 %
5-40 SYRINGE (ML) INJECTION PRN
Status: CANCELLED | OUTPATIENT
Start: 2024-10-28

## 2024-10-28 RX ORDER — DEXAMETHASONE SODIUM PHOSPHATE 4 MG/ML
INJECTION, SOLUTION INTRA-ARTICULAR; INTRALESIONAL; INTRAMUSCULAR; INTRAVENOUS; SOFT TISSUE
Status: DISCONTINUED | OUTPATIENT
Start: 2024-10-28 | End: 2024-10-28 | Stop reason: SDUPTHER

## 2024-10-28 RX ORDER — SODIUM CHLORIDE 9 MG/ML
INJECTION, SOLUTION INTRAVENOUS PRN
Status: CANCELLED | OUTPATIENT
Start: 2024-10-28

## 2024-10-28 RX ORDER — HEPARIN SODIUM 10000 [USP'U]/ML
INJECTION, SOLUTION INTRAVENOUS; SUBCUTANEOUS PRN
Status: DISCONTINUED | OUTPATIENT
Start: 2024-10-28 | End: 2024-10-28 | Stop reason: HOSPADM

## 2024-10-28 RX ORDER — HYDROMORPHONE HYDROCHLORIDE 1 MG/ML
0.5 INJECTION, SOLUTION INTRAMUSCULAR; INTRAVENOUS; SUBCUTANEOUS EVERY 5 MIN PRN
Status: CANCELLED | OUTPATIENT
Start: 2024-10-28

## 2024-10-28 RX ORDER — IPRATROPIUM BROMIDE AND ALBUTEROL SULFATE 2.5; .5 MG/3ML; MG/3ML
1 SOLUTION RESPIRATORY (INHALATION)
Status: CANCELLED | OUTPATIENT
Start: 2024-10-28 | End: 2024-10-29

## 2024-10-28 RX ORDER — SOTALOL HYDROCHLORIDE 80 MG/1
40 TABLET ORAL 2 TIMES DAILY
Qty: 90 TABLET | Refills: 3 | Status: SHIPPED | OUTPATIENT
Start: 2024-10-28

## 2024-10-28 RX ORDER — PROCHLORPERAZINE EDISYLATE 5 MG/ML
5 INJECTION INTRAMUSCULAR; INTRAVENOUS
Status: CANCELLED | OUTPATIENT
Start: 2024-10-28 | End: 2024-10-29

## 2024-10-28 RX ORDER — PHENYLEPHRINE HCL IN 0.9% NACL 0.4MG/10ML
SYRINGE (ML) INTRAVENOUS
Status: DISCONTINUED | OUTPATIENT
Start: 2024-10-28 | End: 2024-10-28 | Stop reason: SDUPTHER

## 2024-10-28 RX ORDER — GLUCAGON 1 MG/ML
1 KIT INJECTION PRN
Status: CANCELLED | OUTPATIENT
Start: 2024-10-28

## 2024-10-28 RX ORDER — SODIUM CHLORIDE, SODIUM LACTATE, POTASSIUM CHLORIDE, CALCIUM CHLORIDE 600; 310; 30; 20 MG/100ML; MG/100ML; MG/100ML; MG/100ML
INJECTION, SOLUTION INTRAVENOUS
Status: DISCONTINUED | OUTPATIENT
Start: 2024-10-28 | End: 2024-10-28 | Stop reason: SDUPTHER

## 2024-10-28 RX ORDER — DEXMEDETOMIDINE HYDROCHLORIDE 100 UG/ML
INJECTION, SOLUTION INTRAVENOUS
Status: DISCONTINUED | OUTPATIENT
Start: 2024-10-28 | End: 2024-10-28 | Stop reason: SDUPTHER

## 2024-10-28 RX ORDER — PROTAMINE SULFATE 10 MG/ML
INJECTION, SOLUTION INTRAVENOUS
Status: DISCONTINUED | OUTPATIENT
Start: 2024-10-28 | End: 2024-10-28 | Stop reason: SDUPTHER

## 2024-10-28 RX ORDER — FENTANYL CITRATE 50 UG/ML
INJECTION, SOLUTION INTRAMUSCULAR; INTRAVENOUS
Status: DISCONTINUED | OUTPATIENT
Start: 2024-10-28 | End: 2024-10-28 | Stop reason: SDUPTHER

## 2024-10-28 RX ORDER — ONDANSETRON 2 MG/ML
INJECTION INTRAMUSCULAR; INTRAVENOUS
Status: DISCONTINUED | OUTPATIENT
Start: 2024-10-28 | End: 2024-10-28 | Stop reason: SDUPTHER

## 2024-10-28 RX ORDER — SUCCINYLCHOLINE CHLORIDE 20 MG/ML
INJECTION INTRAMUSCULAR; INTRAVENOUS
Status: DISCONTINUED | OUTPATIENT
Start: 2024-10-28 | End: 2024-10-28 | Stop reason: SDUPTHER

## 2024-10-28 RX ORDER — SCOLOPAMINE TRANSDERMAL SYSTEM 1 MG/1
1 PATCH, EXTENDED RELEASE TRANSDERMAL
Status: DISCONTINUED | OUTPATIENT
Start: 2024-10-28 | End: 2024-10-28 | Stop reason: HOSPADM

## 2024-10-28 RX ORDER — HEPARIN SODIUM 1000 [USP'U]/ML
INJECTION, SOLUTION INTRAVENOUS; SUBCUTANEOUS
Status: DISCONTINUED | OUTPATIENT
Start: 2024-10-28 | End: 2024-10-28 | Stop reason: SDUPTHER

## 2024-10-28 RX ORDER — NALOXONE HYDROCHLORIDE 0.4 MG/ML
INJECTION, SOLUTION INTRAMUSCULAR; INTRAVENOUS; SUBCUTANEOUS PRN
Status: CANCELLED | OUTPATIENT
Start: 2024-10-28

## 2024-10-28 RX ADMIN — HEPARIN SODIUM 4000 UNITS: 1000 INJECTION, SOLUTION INTRAVENOUS; SUBCUTANEOUS at 09:15

## 2024-10-28 RX ADMIN — PROPOFOL 150 MG: 10 INJECTION, EMULSION INTRAVENOUS at 08:36

## 2024-10-28 RX ADMIN — SUGAMMADEX 200 MG: 100 INJECTION, SOLUTION INTRAVENOUS at 09:59

## 2024-10-28 RX ADMIN — LIDOCAINE HYDROCHLORIDE 100 MG: 20 INJECTION, SOLUTION EPIDURAL; INFILTRATION; INTRACAUDAL; PERINEURAL at 08:36

## 2024-10-28 RX ADMIN — SUCCINYLCHOLINE CHLORIDE 140 MG: 20 INJECTION, SOLUTION INTRAMUSCULAR; INTRAVENOUS at 08:36

## 2024-10-28 RX ADMIN — ONDANSETRON HYDROCHLORIDE 4 MG: 2 INJECTION, SOLUTION INTRAMUSCULAR; INTRAVENOUS at 08:52

## 2024-10-28 RX ADMIN — PHENYLEPHRINE HYDROCHLORIDE 20 MCG/MIN: 10 INJECTION INTRAVENOUS at 08:37

## 2024-10-28 RX ADMIN — ROCURONIUM BROMIDE 40 MG: 10 INJECTION INTRAVENOUS at 08:41

## 2024-10-28 RX ADMIN — PROPOFOL 200 MCG/KG/MIN: 10 INJECTION, EMULSION INTRAVENOUS at 08:37

## 2024-10-28 RX ADMIN — SODIUM CHLORIDE, SODIUM LACTATE, POTASSIUM CHLORIDE, AND CALCIUM CHLORIDE: 600; 310; 30; 20 INJECTION, SOLUTION INTRAVENOUS at 08:26

## 2024-10-28 RX ADMIN — PROTAMINE SULFATE 80 MG: 10 INJECTION, SOLUTION INTRAVENOUS at 09:57

## 2024-10-28 RX ADMIN — DEXAMETHASONE SODIUM PHOSPHATE 8 MG: 4 INJECTION, SOLUTION INTRAMUSCULAR; INTRAVENOUS at 08:52

## 2024-10-28 RX ADMIN — HEPARIN SODIUM 12000 UNITS: 1000 INJECTION, SOLUTION INTRAVENOUS; SUBCUTANEOUS at 09:02

## 2024-10-28 RX ADMIN — MIDAZOLAM HYDROCHLORIDE 2 MG: 1 INJECTION, SOLUTION INTRAMUSCULAR; INTRAVENOUS at 08:26

## 2024-10-28 RX ADMIN — HEPARIN SODIUM 1000 UNITS: 1000 INJECTION, SOLUTION INTRAVENOUS; SUBCUTANEOUS at 09:45

## 2024-10-28 RX ADMIN — ROCURONIUM BROMIDE 10 MG: 10 INJECTION INTRAVENOUS at 08:36

## 2024-10-28 RX ADMIN — FENTANYL CITRATE 100 MCG: 50 INJECTION, SOLUTION INTRAMUSCULAR; INTRAVENOUS at 08:26

## 2024-10-28 RX ADMIN — DEXMEDETOMIDINE HYDROCHLORIDE 20 MCG: 100 INJECTION, SOLUTION, CONCENTRATE INTRAVENOUS at 08:29

## 2024-10-28 RX ADMIN — Medication 80 MCG: at 09:13

## 2024-10-28 NOTE — PROGRESS NOTES
Patent walked the cazares of recovery area. No signs or symptoms of SOB or distress.    I have reviewed discharge instructions with the patient.  The patient verbalized understanding.    Discharge medications reviewed with patient and appropriate educational materials regarding medications and side effects teaching were provided.    Site care instructions reviewed with patient. Pain management teaching completed.    Patient instructed to make follow up appointments per discharge instructions.     Patient belongings packed up and accounted for with patient and family. All patient belongings sent home with patient.    Telemetry monitor and wires removed      Patient signed discharge instructions after reviewing them, and duplicate copy placed in chart.     Pt discharged home with daughter Leatha

## 2024-10-28 NOTE — ANESTHESIA PRE PROCEDURE
Department of Anesthesiology  Preprocedure Note       Name:  Kierra Lakhani   Age:  74 y.o.  :  1950                                          MRN:  [id number]         Date:  10/28/2024      Surgeon: Surgeon(s):  Hero Dietz MD    Procedure: Procedure(s):  Ablation A-fib w complete ep study    Medications prior to admission:   Prior to Admission medications    Medication Sig Start Date End Date Taking? Authorizing Provider   apixaban (ELIQUIS) 5 MG TABS tablet Take 1 tablet by mouth 2 times daily 10/7/24   Flex Perry MD   cetirizine (ZYRTEC) 10 MG tablet Take 1 tablet by mouth daily    Melissa Samayoa MD   pravastatin (PRAVACHOL) 40 MG tablet TAKE 1 TABLET BY MOUTH DAILY  Patient taking differently: Take 1 tablet by mouth daily 24   Dean Soto MD   atenolol (TENORMIN) 50 MG tablet TAKE 1 TABLET BY MOUTH DAILY  Patient taking differently: Take 1 tablet by mouth daily 24   Dean Soto MD   letrozole (FEMARA) 2.5 MG tablet Take 1 tablet by mouth daily 10/5/23   Melissa Samayoa MD   scopolamine (TRANSDERM-SCOP) transdermal patch APPLY 1 PATCH TOPICALLY TO  HAIRLESS AREA BEHIND ONE EAR  EVERY 72 HOURS  Patient taking differently: Place 1 patch onto the skin as needed 24   Dean Soto MD   famotidine (PEPCID) 20 MG tablet Take 1 tablet by mouth nightly as needed    Melissa Samayoa MD   Omega-3 Fatty Acids (FISH OIL) 1000 MG capsule Take 1 capsule by mouth daily    Melissa Samayoa MD   vitamin D 25 MCG (1000 UT) CAPS Take 1 capsule by mouth daily    Automatic Reconciliation, Ar   dupilumab (DUPIXENT) 300 MG/2ML SOPN injection Inject 2 mLs into the skin every 14 days ceived the following from [facility]: Outside name: Dupixent Pen 300 mg/2 mL pnij 22   Automatic Reconciliation, Ar   esomeprazole (NEXIUM) 20 MG delayed release capsule Take 1 capsule by mouth daily    Automatic Reconciliation, Ar       Current medications:    No current

## 2024-10-28 NOTE — ANESTHESIA POSTPROCEDURE EVALUATION
Department of Anesthesiology  Postprocedure Note    Patient: Kierra Lakhani  MRN: [id number]  YOB: 1950  Date of evaluation: 10/28/2024    Procedure Summary       Date: 10/28/24 Room / Location: [facility] EP LAB 2 / [facility] CARDIAC CATH LAB    Anesthesia Start: 0826 Anesthesia Stop: 1029    Procedures:       Ablation A-fib w complete ep study      Ultrasound guided vascular access      Ablation following A-fib addl      Ablation A-flutter Diagnosis: Persistent atrial fibrillation (HCC)    Providers: Hero Dietz MD Responsible Provider: Mendez Garg MD    Anesthesia Type: General ASA Status: 3            Anesthesia Type: General    Gilda Phase I: Gilda Score: 10    Gilda Phase II:      Anesthesia Post Evaluation    Patient location during evaluation: PACU  Patient participation: complete - patient participated  Level of consciousness: awake and alert  Airway patency: patent  Nausea & Vomiting: no nausea  Cardiovascular status: hemodynamically stable  Respiratory status: acceptable  Hydration status: euvolemic  Multimodal analgesia pain management approach  Pain management: adequate        There were no known notable events for this encounter.

## 2024-10-28 NOTE — PROGRESS NOTES
Cardiac Cath Lab Recovery Arrival Note:      Kierra Lakhani arrived to Cardiac Cath Lab, Recovery Area. Staff introduced to patient. Patient identifiers verified with NAME and DATE OF BIRTH. Procedure verified with patient. Consent forms reviewed and signed by patient or authorized representative and verified. Allergies verified.     Patient and family oriented to department. Patient and family informed of procedure and plan of care.     Questions answered with review. Patient prepped for procedure, per orders from physician, prior to arrival.    Patient on cardiac monitor, non-invasive blood pressure, SPO2 monitor. On ra. Patient is A&Ox 4. Patient reports no pain.     Patient in stretcher, in low position, with side rails up, call bell within reach, patient instructed to call if assistance as needed.    Patient prep in: [facility] Recovery Area, [place] [address].   Patient family has pager # n/a  Family in: russ.   Prep by: sahil

## 2024-10-29 LAB
ACT BLD: 171 SECS (ref 79–138)
ACT BLD: 318 SECS (ref 79–138)

## 2025-04-10 NOTE — TELEPHONE ENCOUNTER
MD Soto patient- Nothing scheduled w/new provider- [facility] requesting new rx's -     Please contact patient to schedule w/new provider.  Thanks, Acacia    Last appointment: 9/4/24 Brittany  Next appointment: None- Needs New provider  Previous refill encounter(s):   Atenolol 5/13/24 90 + 3  Pravastatin 6/14/24 90 + 3    Requested Prescriptions     Pending Prescriptions Disp Refills    atenolol (TENORMIN) 50 MG tablet 30 tablet 0     Sig: Take 1 tablet by mouth daily    pravastatin (PRAVACHOL) 40 MG tablet 30 tablet 0     Sig: Take 1 tablet by mouth daily     For Pharmacy Admin Tracking Only    Program: Medication Refill  CPA in place:    Recommendation Provided To:   Intervention Detail: New Rx: 2, reason: Patient Preference  Intervention Accepted By:   Gap Closed?:    Time Spent (min): 5

## 2025-04-16 RX ORDER — PRAVASTATIN SODIUM 40 MG
40 TABLET ORAL DAILY
Qty: 30 TABLET | Refills: 0 | OUTPATIENT
Start: 2025-04-16

## 2025-04-16 RX ORDER — ATENOLOL 50 MG/1
50 TABLET ORAL DAILY
Qty: 30 TABLET | Refills: 0 | OUTPATIENT
Start: 2025-04-16

## 2025-07-24 ENCOUNTER — HOSPITAL ENCOUNTER (EMERGENCY)
Facility: HOSPITAL | Age: 75
Discharge: HOME | End: 2025-07-24
Attending: EMERGENCY MEDICINE
Payer: MEDICARE

## 2025-07-24 VITALS
HEART RATE: 50 BPM | WEIGHT: 195 LBS | OXYGEN SATURATION: 100 % | DIASTOLIC BLOOD PRESSURE: 63 MMHG | RESPIRATION RATE: 18 BRPM | BODY MASS INDEX: 32.49 KG/M2 | SYSTOLIC BLOOD PRESSURE: 165 MMHG | HEIGHT: 65 IN | TEMPERATURE: 97.7 F

## 2025-07-24 DIAGNOSIS — R00.1 BRADYCARDIA: Primary | ICD-10-CM

## 2025-07-24 LAB
ALBUMIN SERPL-MCNC: 3.6 G/DL (ref 3.5–5)
ALBUMIN/GLOB SERPL: 1.4 (ref 1.1–2.2)
ALP SERPL-CCNC: 87 U/L (ref 45–117)
ALT SERPL-CCNC: 17 U/L (ref 12–78)
ANION GAP SERPL CALC-SCNC: 5 MMOL/L (ref 2–12)
AST SERPL-CCNC: 13 U/L (ref 15–37)
BASOPHILS # BLD: 0.02 K/UL (ref 0–0.1)
BASOPHILS NFR BLD: 0.2 % (ref 0–1)
BILIRUB SERPL-MCNC: 0.6 MG/DL (ref 0.2–1)
BUN SERPL-MCNC: 13 MG/DL (ref 6–20)
BUN/CREAT SERPL: 12 (ref 12–20)
CALCIUM SERPL-MCNC: 9.4 MG/DL (ref 8.5–10.1)
CHLORIDE SERPL-SCNC: 104 MMOL/L (ref 97–108)
CO2 SERPL-SCNC: 28 MMOL/L (ref 21–32)
CREAT SERPL-MCNC: 1.05 MG/DL (ref 0.55–1.02)
DIFFERENTIAL METHOD BLD: ABNORMAL
EOSINOPHIL # BLD: 0 K/UL (ref 0–0.4)
EOSINOPHIL NFR BLD: 0 % (ref 0–7)
ERYTHROCYTE [DISTWIDTH] IN BLOOD BY AUTOMATED COUNT: 11.5 % (ref 11.5–14.5)
GLOBULIN SER CALC-MCNC: 2.5 G/DL (ref 2–4)
GLUCOSE SERPL-MCNC: 164 MG/DL (ref 65–100)
HCT VFR BLD AUTO: 32.9 % (ref 35–47)
HGB BLD-MCNC: 11.2 G/DL (ref 11.5–16)
IMM GRANULOCYTES # BLD AUTO: 0.04 K/UL (ref 0–0.04)
IMM GRANULOCYTES NFR BLD AUTO: 0.4 % (ref 0–0.5)
LYMPHOCYTES # BLD: 1.09 K/UL (ref 0.8–3.5)
LYMPHOCYTES NFR BLD: 10.5 % (ref 12–49)
MAGNESIUM SERPL-MCNC: 1.8 MG/DL (ref 1.6–2.4)
MCH RBC QN AUTO: 34.6 PG (ref 26–34)
MCHC RBC AUTO-ENTMCNC: 34 G/DL (ref 30–36.5)
MCV RBC AUTO: 101.5 FL (ref 80–99)
MONOCYTES # BLD: 0.68 K/UL (ref 0–1)
MONOCYTES NFR BLD: 6.6 % (ref 5–13)
NEUTS SEG # BLD: 8.51 K/UL (ref 1.8–8)
NEUTS SEG NFR BLD: 82.3 % (ref 32–75)
NRBC # BLD: 0 K/UL (ref 0–0.01)
NRBC BLD-RTO: 0 PER 100 WBC
PLATELET # BLD AUTO: 180 K/UL (ref 150–400)
PMV BLD AUTO: 11 FL (ref 8.9–12.9)
POTASSIUM SERPL-SCNC: 4.1 MMOL/L (ref 3.5–5.1)
PROT SERPL-MCNC: 6.1 G/DL (ref 6.4–8.2)
RBC # BLD AUTO: 3.24 M/UL (ref 3.8–5.2)
SODIUM SERPL-SCNC: 137 MMOL/L (ref 136–145)
TROPONIN I SERPL HS-MCNC: 16 NG/L (ref 0–51)
WBC # BLD AUTO: 10.3 K/UL (ref 3.6–11)

## 2025-07-24 PROCEDURE — 80053 COMPREHEN METABOLIC PANEL: CPT

## 2025-07-24 PROCEDURE — 85025 COMPLETE CBC W/AUTO DIFF WBC: CPT

## 2025-07-24 PROCEDURE — 2580000003 HC RX 258: Performed by: EMERGENCY MEDICINE

## 2025-07-24 PROCEDURE — 83735 ASSAY OF MAGNESIUM: CPT

## 2025-07-24 PROCEDURE — 84484 ASSAY OF TROPONIN QUANT: CPT

## 2025-07-24 PROCEDURE — 99284 EMERGENCY DEPT VISIT MOD MDM: CPT

## 2025-07-24 RX ORDER — 0.9 % SODIUM CHLORIDE 0.9 %
500 INTRAVENOUS SOLUTION INTRAVENOUS ONCE
Status: COMPLETED | OUTPATIENT
Start: 2025-07-24 | End: 2025-07-24

## 2025-07-24 RX ORDER — CEFADROXIL 500 MG/1
500 CAPSULE ORAL 2 TIMES DAILY
COMMUNITY
Start: 2025-07-23 | End: 2025-07-30

## 2025-07-24 RX ORDER — CELECOXIB 200 MG/1
200 CAPSULE ORAL EVERY EVENING
COMMUNITY
Start: 2025-07-23 | End: 2025-07-30

## 2025-07-24 RX ORDER — TRANEXAMIC ACID 650 MG/1
1950 TABLET ORAL
COMMUNITY
Start: 2025-07-23 | End: 2025-07-27

## 2025-07-24 RX ORDER — TRAMADOL HYDROCHLORIDE 50 MG/1
50 TABLET ORAL EVERY 6 HOURS PRN
COMMUNITY

## 2025-07-24 RX ORDER — ONDANSETRON 4 MG/1
4 TABLET, ORALLY DISINTEGRATING ORAL 3 TIMES DAILY PRN
Qty: 21 TABLET | Refills: 0 | Status: SHIPPED | OUTPATIENT
Start: 2025-07-24

## 2025-07-24 RX ADMIN — SODIUM CHLORIDE 500 ML: 0.9 INJECTION, SOLUTION INTRAVENOUS at 12:59

## 2025-07-24 ASSESSMENT — LIFESTYLE VARIABLES
HOW OFTEN DO YOU HAVE A DRINK CONTAINING ALCOHOL: NEVER
HOW MANY STANDARD DRINKS CONTAINING ALCOHOL DO YOU HAVE ON A TYPICAL DAY: PATIENT DOES NOT DRINK

## 2025-07-24 ASSESSMENT — PAIN DESCRIPTION - LOCATION: LOCATION: KNEE

## 2025-07-24 ASSESSMENT — PAIN - FUNCTIONAL ASSESSMENT
PAIN_FUNCTIONAL_ASSESSMENT: 0-10
PAIN_FUNCTIONAL_ASSESSMENT: PREVENTS OR INTERFERES SOME ACTIVE ACTIVITIES AND ADLS

## 2025-07-24 ASSESSMENT — PAIN DESCRIPTION - FREQUENCY: FREQUENCY: INTERMITTENT

## 2025-07-24 ASSESSMENT — PAIN DESCRIPTION - DESCRIPTORS: DESCRIPTORS: ACHING

## 2025-07-24 ASSESSMENT — PAIN DESCRIPTION - ONSET: ONSET: ON-GOING

## 2025-07-24 ASSESSMENT — PAIN DESCRIPTION - PAIN TYPE: TYPE: ACUTE PAIN

## 2025-07-24 ASSESSMENT — PAIN DESCRIPTION - ORIENTATION: ORIENTATION: RIGHT

## 2025-07-24 ASSESSMENT — PAIN SCALES - GENERAL: PAINLEVEL_OUTOF10: 7

## 2025-07-24 NOTE — CONSULTS
[facility]        Consult    NAME: Kierra Lakhani   :  1950   MRN:  [id number]     Date/Time:  2025 2:15 PM    Patient PCP: Dean Soto MD  ________________________________________________________________________     Assessment:     Sinus Bradycardia  R Partial Knee Replacement   Paroxysmal Atrial Fibrillation s/p Afib ablation  Hypertension  Hyperlipidemia  Obesity  Probable Sleep Apnea  Leg Edema  Full Code    -t/-e/-s      Plan:   Discussed with Dr Tripathi and Dr Dugan  EKG with Sinus Jonathan rate 48, Nelsy 146 ms, QRS 78 ms, Qtc 414 ms. Suspect combo of pain meds, Atenolol  50and possible ANNE MARIE. Hold Atenolol tonight and then check heart rate tomorrow. She has our RPM program. If heart rate in the 50s, she can take Atenolol 25. If in the 40s, then will hold BB again.   Recommended sleep study down the road.   Likely Dependent Edema, but has not been worked up. Encouraged elevation and compression. Consider CI study as outpatient     [x]       High complexity decision making was performed in this patient at high risk for decompensation with multiple organ involvement.      Subjective:   CHIEF COMPLAINT: Sinus Jonathan    HISTORY OF PRESENT ILLNESS:     Kierra is a 74 y.o.   female who underwent knee replacement yesterday and was discharged to home who presented with concern for fatigue and bradycardia. She has known history of PAF and had an Afib ablation. She has been maintaining her NSR and is typically on Atenolol 50 mg daily. She had mild nausea. She denies chest pain, palpitations, shortness of breath, dyspnea on exertion, orthopnea, PND, syncope.       We were asked to consult for work up and evaluation of the above problems.     Past Medical History:   Diagnosis Date    Adverse effect of anesthesia     treats pt for motion sickness with a patch    AR (allergic rhinitis)     Arthritis     Atrial flutter (HCC)     dr rodriguez    Breast cancer (HCC)

## 2025-07-24 NOTE — ED PROVIDER NOTES
[facility] EMERGENCY DEPARTMENT  EMERGENCY DEPARTMENT ENCOUNTER       Pt Name: Kierra Lakhani  MRN: [id number]  Birthdate 1950  Date of evaluation: 7/24/2025  Provider: Joce Dugan DO   PCP: Dean Soto MD  Note Started: 1:48 PM EDT 7/24/25     CHIEF COMPLAINT       Chief Complaint   Patient presents with    Bradycardia     Pt in WC in [place]. Pt has hx of AFIB with ablation, on thinners. Pt states yesterday she had R partial knee replacement. Pt c/o bradycardia via apple watch x today. Pt denies CP or SOB, c/o fatigue and feeling lightheaded with nausea .        HISTORY OF PRESENT ILLNESS: 1 or more elements      History From: Patient, History limited by: none     Kierra Lakhani is a 74 y.o. female presents to the emergency department for evaluation of generalized weakness fatigue and bradycardia.       Please See MDM for Additional Details of the HPI/PMH  Nursing Notes were all reviewed and agreed with or any disagreements were addressed in the HPI.     REVIEW OF SYSTEMS        Positives and Pertinent negatives as per HPI.    PAST HISTORY     Past Medical History:  Past Medical History:   Diagnosis Date    Adverse effect of anesthesia     treats pt for motion sickness with a patch    AR (allergic rhinitis)     Arthritis     Atrial flutter (HCC)     dr rodriguez    Breast cancer (HCC) 2023    Bullous pemphigoid (HCC) 09/2022    Calculus of kidney     Dyspareunia     GERD (gastroesophageal reflux disease) 11/30/2010    HBP (high blood pressure) 08/09/2010    History of left breast cancer     7/2023-  lumpectomy + Femara    History of motion sickness 03/29/2014    Hyperlipidemia LDL goal <100 07/11/2018    Incomplete rectal prolapse 08/06/2015    Obesity (BMI 30-39.9) 06/30/2013    Prediabetes     no longer taking medications       Past Surgical History:  Past Surgical History:   Procedure Laterality Date    BREAST SURGERY Left 09/2023    lumpectomy    CATARACT REMOVAL Bilateral 08/2018    left 09/18 right 2018

## (undated) DEVICE — STERILE POLYISOPRENE POWDER-FREE SURGICAL GLOVES: Brand: PROTEXIS

## (undated) DEVICE — SOLIDIFIER FLD 2OZ 1500CC N DISINF IN BTL DISP SAFESORB

## (undated) DEVICE — NDL FLTR TIP 5 MIC 18GX1.5IN --

## (undated) DEVICE — EYE PADSSTERILENOT MADE WITH NATURAL RUBBER LATEXSINGLE USE ONLYDO NOT USE IF PACKAGE OPENED OR DAMAGED: Brand: CARDINAL HEALTH

## (undated) DEVICE — SYR 3ML LL TIP 1/10ML GRAD --

## (undated) DEVICE — CATHETER MAP D CRV 3-3-3-3-3 MM SPC GALAXY OCTARAY

## (undated) DEVICE — BLOCK BITE ENDOSCP AD 21 MM W/ DIL BLU LF DISP

## (undated) DEVICE — PINNACLE INTRODUCER SHEATH: Brand: PINNACLE

## (undated) DEVICE — NEEDLE HYPO 18GA L1.5IN PNK S STL HUB POLYPR SHLD REG BVL

## (undated) DEVICE — SURGICAL PROCEDURE PACK CATRCT CUST

## (undated) DEVICE — 1200 GUARD II KIT W/5MM TUBE W/O VAC TUBE: Brand: GUARDIAN

## (undated) DEVICE — HEART CATH-MRMC: Brand: MEDLINE INDUSTRIES, INC.

## (undated) DEVICE — SYRINGE INSULIN 1ML LUERSLIP TIP W/O SFTY U100 BLISTER PK

## (undated) DEVICE — Device

## (undated) DEVICE — FORCEPS BX L160CM DIA8MM GRSP DISECT CUP TIP NONLOCKING ROT

## (undated) DEVICE — CATH IV AUTOGRD BC PNK 20GA 25 -- INSYTE

## (undated) DEVICE — SET ADMIN 16ML TBNG L100IN 2 Y INJ SITE IV PIGGY BK DISP

## (undated) DEVICE — CONTAINER SPEC 20 ML LID NEUT BUFF FORMALIN 10 % POLYPR STS

## (undated) DEVICE — SYR 10ML LUER LOK 1/5ML GRAD --

## (undated) DEVICE — (D)SYR 10ML 1/5ML GRAD NSAF -- PKGING CHANGE USE ITEM 338027

## (undated) DEVICE — NEONATAL-ADULT SPO2 SENSOR: Brand: NELLCOR

## (undated) DEVICE — Z DISCONTINUED PER MEDLINE LINE GAS SAMPLING O2/CO2 LNG AD 13 FT NSL W/ TBNG FILTERLINE

## (undated) DEVICE — CABLE REPROC INTERFACE CARTO 3

## (undated) DEVICE — KENDALL DL ECG CABLE AND LEAD WIRE SYSTEM, 3-LEAD, SINGLE PATIENT USE: Brand: KENDALL

## (undated) DEVICE — HIGH FLOW RATE EXTENSION SET, LUER LOCK ADAPTERS

## (undated) DEVICE — YANKAUER,TAPERED BULBOUS TIP,W/O VENT: Brand: MEDLINE

## (undated) DEVICE — ELECTRODE,RADIOTRANSLUCENT,FOAM,5PK: Brand: MEDLINE

## (undated) DEVICE — FORCEP BX LG CAP 2.4 MMX120 CM W/ NDL YEL RADIAL JAW 4 DISP

## (undated) DEVICE — 3M™ TEGADERM™ TRANSPARENT FILM DRESSING FRAME STYLE, 1624W, 2-3/8 IN X 2-3/4 IN (6 CM X 7 CM), 100/CT 4CT/CASE: Brand: 3M™ TEGADERM™

## (undated) DEVICE — 1 X VERSACROSS TRANSSEPTAL SHEATH (INCLUDING  1 X J-TIP GUIDEWIRE); 1 X VERSACROSS RF WIRE (INCLUDING 1 X CONNECTOR CABLE (SINGLE USE)); 1 X DISPERSIVE ELECTRODE: Brand: VERSACROSS ACCESS SOLUTION

## (undated) DEVICE — Device: Brand: MEDEX

## (undated) DEVICE — SOLUTION IV STRL H2O 500 ML AQUALITE POUR BTL

## (undated) DEVICE — SYR 5ML 1/5 GRAD LL NSAF LF --

## (undated) DEVICE — BASIN EMSIS 16OZ GRAPHITE PLAS KID SHP MOLD GRAD FOR ORAL

## (undated) DEVICE — CATHETER REPROC SNDSTR ECO 3D DGNSTC ULTRSND USE SMNS IMGNG SSTM 10

## (undated) DEVICE — PATCH REF EXT FOR CARTO 3 SYS (EA = 6 PACKS)

## (undated) DEVICE — TOWEL 4 PLY TISS 19X30 SUE WHT

## (undated) DEVICE — PRESSURE MONITORING SET: Brand: TRUWAVE

## (undated) DEVICE — KENDALL RADIOLUCENT FOAM MONITORING ELECTRODE RECTANGULAR SHAPE: Brand: KENDALL

## (undated) DEVICE — TUBING PMP FOR CARTO SYS SMARTABLATE

## (undated) DEVICE — CLEANGUIDE DISPOSABLE MARKED SPRING TIP GUIDEWIRE, 1.86 MM X 210 CM: Brand: CLEANGUIDE

## (undated) DEVICE — CATHETER EP 7FR L115CM 2-8-2MM SPC TIP 2MM 10 ELECTRD D CRV

## (undated) DEVICE — CABLE REPROC RPRCSS 20 POLE A/20 POLE B LGHT BL 34PN DARK BL 34PN C

## (undated) DEVICE — BAG SPEC BIOHZRD 10 X 10 IN --

## (undated) DEVICE — BASIN EMESIS 500CC ROSE 250/CS 60/PLT: Brand: MEDEGEN MEDICAL PRODUCTS, LLC

## (undated) DEVICE — SOLUTION IV 250ML 0.9% SOD CHL CLR INJ FLX BG CONT PRT CLSR

## (undated) DEVICE — SOLIDIFIER MEDC 1200ML -- CONVERT TO 356117

## (undated) DEVICE — SHEATH GUID 11.5X8.5FR L71MM M CRV L22MM BIDIR STEER CARTO

## (undated) DEVICE — THE MONARCH® "D" CARTRIDGE IS A SINGLE-USE POLYPROPYLENE CARTRIDGE FOR POSTERIOR CHAMBER IOL DELIVERY: Brand: MONARCH® III

## (undated) DEVICE — CATHETER ABLAT 8FR L115CM 1-6-2MM SPC TIP 3.5MM FJ CRV

## (undated) DEVICE — CABLE REPROC QUAD QUAD B DECA YELLW 12 PN DARK BLUE 12 PN CARTO 3

## (undated) DEVICE — ELECTRODE PT RET AD L9FT HI MOIST COND ADH HYDRGEL CORDED

## (undated) DEVICE — MEDI-TRACE CADENCE ADULT, DEFIBRILLATION ELECTRODE -RTS  (10 PR/PK) - PHYSIO-CONTROL: Brand: MEDI-TRACE CADENCE